# Patient Record
Sex: MALE | Race: WHITE | NOT HISPANIC OR LATINO | Employment: OTHER | URBAN - METROPOLITAN AREA
[De-identification: names, ages, dates, MRNs, and addresses within clinical notes are randomized per-mention and may not be internally consistent; named-entity substitution may affect disease eponyms.]

---

## 2017-02-10 ENCOUNTER — ALLSCRIPTS OFFICE VISIT (OUTPATIENT)
Dept: OTHER | Facility: OTHER | Age: 77
End: 2017-02-10

## 2017-02-20 ENCOUNTER — GENERIC CONVERSION - ENCOUNTER (OUTPATIENT)
Dept: OTHER | Facility: OTHER | Age: 77
End: 2017-02-20

## 2018-01-10 DIAGNOSIS — E78.2 MIXED HYPERLIPIDEMIA: ICD-10-CM

## 2018-01-10 DIAGNOSIS — Z12.5 ENCOUNTER FOR SCREENING FOR MALIGNANT NEOPLASM OF PROSTATE: ICD-10-CM

## 2018-01-12 NOTE — PROGRESS NOTES
Assessment   1  Encounter for preventive health examination (V70 0) (Z00 00)  2  Mixed hyperlipidemia (272 2) (E78 2)  3  Chronic obstructive pulmonary disease (COPD) (496) (J44 9)  4  Seborrhea (706 3) (L21 9)  5  Lumbar degenerative disc disease (722 52) (M51 36)    Plan  Health Maintenance    · Follow-up visit in 1 year Evaluation and Treatment  Follow-up  Status: Hold For -  Scheduling  Requested for: 88TQZ2531   · Medicare Annual Wellness Visit ; every 1 year; Last 63FLJ5193; Next 02CPM8817;  Status:Active  Mixed hyperlipidemia    · (1) CBC/PLT/DIFF; Status:Active; Requested NEN:42JLW4484;    · (1) COMPREHENSIVE METABOLIC PANEL; Status:Active; Requested APQ:88TOB8600;    · (1) LIPID PANEL, FASTING; Status:Active; Requested LXA:46APV5800; Mixed hyperlipidemia, Prostate cancer screening    · (1) PSA (SCREEN) (Dx V76 44 Screen for Prostate Cancer); Status:Active; Requested  FMH:75DGB3015;   Seborrhea    · Start: Triamcinolone Acetonide 0 1 % External Cream; APPLY SPARINGLY TO  AFFECTED AREA(S) 2 TO 3 TIMES DAILY  SOB (shortness of breath)    · Renew: Ventolin  (90 Base) MCG/ACT Inhalation Aerosol Solution; 2 puffs q 4 h  prn sob    Discussion/Summary  Impression: health maintenance visit  Prostate cancer screening: prostate cancer screening is current  Colorectal cancer screening: colorectal cancer screening is current  The immunizations are needed  Self Referrals: No      Chief Complaint  Pt  in office today for a check up  Pt  is requesting his inhaler to be refilled  History of Present Illness  HM, Adult Male: The patient is being seen for a health maintenance evaluation  The last health maintenance visit was 1 year(s) ago  Social History: Household members include spouse  He is   The patient is a former cigarette smoker  He reports occasional alcohol use  General Health: The patient's health since the last visit is described as good  Lifestyle:   He does not use tobacco  He consumes alcohol  He denies drug use  Screening: cancer screening reviewed and current  metabolic screening reviewed and current  risk screening reviewed and current  HPI: Patient comes in for a checkup  Since last year he has had repair of abdominal aortic aneurysm  He complains of itchy rash posterior scalp  Review of Systems    Constitutional: No fever or chills, feels well, no tiredness, no recent weight gain or weight loss  Cardiovascular: No complaints of slow heart rate, no fast heart rate, no chest pain, no palpitations, no leg claudication, no lower extremity  Respiratory: shortness of breath and shortness of breath during exertion  Gastrointestinal: No complaints of abdominal pain, no constipation, no nausea or vomiting, no diarrhea or bloody stools  Musculoskeletal: arthralgias  Active Problems   1  Abdominal aortic aneurysm (441 4) (I71 4)  2  Acute URI (465 9) (J06 9)  3  Benign hypertrophy of prostate (600 00) (N40 0)  4  Benign prostatic hypertrophy with urinary obstruction (600 01,599 69) (N40 1,N13 8)  5  Bronchitis, acute (466 0) (J20 9)  6  Chronic obstructive pulmonary disease (COPD) (496) (J44 9)  7  Chronic sinusitis (473 9) (J32 9)  8  Chronic vertigo (780 4) (R42)  9  Dyspnea on exertion (786 09) (R06 09)  10  Kidney stones (592 0) (N20 0)  11  Lumbar degenerative disc disease (722 52) (M51 36)  12  Mixed hyperlipidemia (272 2) (E78 2)  13  Osteoarthritis (715 90) (M19 90)  14  Peptic reflux disease (530 81) (K21 9)  15  Preop cardiovascular exam (V72 81) (Z01 810)  16  Screening for colon cancer (V76 51) (Z12 11)  17  SOB (shortness of breath) (786 05) (R06 02)  18  Tendonitis of shoulder (726 10) (M75 80)  19   URI (upper respiratory infection) (465 9) (J06 9)    Past Medical History    · History of colonoscopy (V45 89) (F68 285)   · History of EKG (V15 89) (Z92 89)    Surgical History    · History of Endovasc Repair Abd Aorta Aneurysm Place Visceral Extens Prosth · History of Hand Surgery    Family History  Mother    · Family history of Congestive heart failure   · Family history of Diabetes  Father    · Family history of Death    Social History    · Always uses seat belt   · Exercises regularly   · Former smoker (S49 03) (S13 315)   · Lack physical exercise (V69 0) (Z72 3)   · Lives with spouse   ·    · No drug use   · Occasional alcohol use   · Primary spoken language English   · Retired    Current Meds  1  Simvastatin 20 MG Oral Tablet; take 1 tablet by mouth every day; Therapy: 96EPK1022 to (Evaluate:15Jun2017)  Requested for: 20Jun2016; Last   Rx:20Jun2016; Status: ACTIVE - Renewal Denied Ordered  2  Ventolin  (90 Base) MCG/ACT Inhalation Aerosol Solution; 2 puffs q 4 h prn sob; Therapy: 75KWV3368 to (Last Rx:08Oct2016)  Requested for: 54OIF0744 Ordered    Allergies   1  codeine  2  Penicillins    Vitals   Recorded: 71BTC7684 09:00AM Recorded: 52CVS8933 08:23AM   Heart Rate 1 77   Systolic 7349 741   Diastolic 717 90   Height 1 5 ft 8 in   Weight 1 176 lb    BMI Calculated 1 26 76   BSA Calculated 1 1 94   O2 Saturation 1 97       1  Amended By: Amarjit Masterson; Feb 10 2017 9:00 AM EST   Physical Exam    Constitutional   General appearance: No acute distress, well appearing and well nourished  Head and Face   Head and face: Normal     Eyes   Conjunctiva and lids: No erythema, swelling or discharge  Pupils and irises: Equal, round, reactive to light  Ears, Nose, Mouth, and Throat   External inspection of ears and nose: Normal     Otoscopic examination: Tympanic membranes translucent with normal light reflex  Canals patent without erythema  Hearing: Normal     Oropharynx: Normal with no erythema, edema, exudate or lesions  Neck   Neck: Supple, symmetric, trachea midline, no masses  Thyroid: Normal, no thyromegaly  Pulmonary   Respiratory effort: No increased work of breathing or signs of respiratory distress      Auscultation of lungs: Clear to auscultation  Cardiovascular   Auscultation of heart: Normal rate and rhythm, normal S1 and S2, no murmurs  Carotid pulses: 2+ bilaterally  Abdominal aorta: Normal     Chest   Chest: Normal     Abdomen   Abdomen: Non-tender, no masses  Liver and spleen: No hepatomegaly or splenomegaly  Lymphatic   Palpation of lymph nodes in neck: No lymphadenopathy  Palpation of lymph nodes in other areas: No lymphadenopathy  Musculoskeletal   Gait and station: Normal     Inspection/palpation of digits and nails: Normal without clubbing or cyanosis  Inspection/palpation of joints, bones, and muscles: Normal     Skin   Skin and subcutaneous tissue: Abnormal   Crusty rash at base of scalp  Neurologic   Cranial nerves: Cranial nerves 2-12 intact  Reflexes: 2+ and symmetric  Sensation: No sensory loss  Psychiatric   Judgment and insight: Normal     Orientation to person, place and time: Normal     Mood and affect: Normal        Health Management  Health Maintenance   Medicare Annual Wellness Visit; every 1 year; Last 09GPK7600; Next Due: 39ASZ0492;   Active    Signatures   Electronically signed by : BETTY yT ; Feb 10 2017  9:00AM EST                       (Author)

## 2018-01-13 VITALS
WEIGHT: 176 LBS | DIASTOLIC BLOOD PRESSURE: 84 MMHG | OXYGEN SATURATION: 97 % | SYSTOLIC BLOOD PRESSURE: 136 MMHG | HEIGHT: 68 IN | HEART RATE: 77 BPM | BODY MASS INDEX: 26.67 KG/M2

## 2018-01-16 NOTE — RESULT NOTES
Verified Results  NM MYOCARDIAL PERFUSION SPECT (RX STRESS AND/OR REST) 91IAY2542 11:53AM Rubi Shabazz     Test Name Result Flag Reference   NM MYOCARDIAL PERFUSION SPECT (RX STRESS AND/OR REST) (Report)     [address], [address] [place] [address]   [phone]     Rest/Stress Gated SPECT Myocardial Perfusion Imaging After Regadenoson     Patient: Darrell Benitez   MR number: [id number]   Account number: [de-identified]   : 1940   Age: 70 years   Gender: Male   Status: Outpatient   Location: [facility]   Height: 68 in   Weight: 171 lb   BP: 146/ 78 mmHg     Allergies: NO KNOWN ALLERGIES     Diagnosis: R06 02 - Shortness of breath, Z01 810 - Encounter for preprocedural   cardiovascular examination     Primary Physician: Renay Nicholson MD   Referring Physician: Bala Perry MD   Technician: Michelle Olmos   Group: [facility]   Interpreting Physician: Bala Perry MD     INDICATIONS: Detection of coronary artery disease  Pre-operative risk   assessment  HISTORY: The patient is a 70year old  male  Chest pain status: no   chest pain  Other symptoms: dyspnea  Coronary artery disease risk factors:   dyslipidemia and smoking (quit )  Cardiovascular history: none   significant  Co-morbidity: history of lung disease  Medications: a lipid   lowering agent  PHYSICAL EXAM: Baseline physical exam screening: normal      REST ECG: ST segments and T waves were normal  Sinus bradycardia  Minor   Nonspecific ST and T wave abnormalities were present  PROCEDURE: The study was performed at [address] [place] [address]  A   regadenoson infusion pharmacologic stress test was performed  Gated SPECT   myocardial perfusion imaging was performed after stress and at rest  Systolic   blood pressure was 146 mmHg, at the start of the study  Diastolic blood   pressure was 78 mmHg, at the start of the study   The heart rate was 56 bpm, at   the start of the study  Regadenoson protocol:   HR bpm SBP mmHg DBP mmHg Symptoms ST change Rhythm/conduct   Baseline 56 146 78 none none NSR, occasional PAC's   Immediate 102 -- -- mild dyspnea none NSR, frequent PAC's   1 min 101 134 66 same as above none NSR, no ectopy   2 min 90 136 68 subsiding none NSR, no ectopy   3 min 89 -- -- none none NSR, no ectopy   4 min 88 136 66 none none NSR, no ectopy   IV aminophylline ( 50 mg) was administered  STRESS SUMMARY: Duration of pharmacologic stress was 1 min  Maximal heart rate   during stress was 102 bpm  The rate-pressure product for the peak heart rate   and blood pressure was 36435  There was no chest pain during stress  The stress   test was terminated due to protocol completion  The stress ECG was negative for   ischemia  There were no stress arrhythmias or conduction abnormalities  ISOTOPE ADMINISTRATION:   Resting isotope administration Stress isotope administration   Agent Tetrofosmin Tetrofosmin   Dose 9 88 mCi 32 71 mCi   Date 03/11/2016 03/11/2016   Injection time 12:06 13:15   Injection-image interval 45 min 30 min     The radiopharmaceutical was injected at the peak effect of pharmacologic   stress  MYOCARDIAL PERFUSION IMAGING:   The image quality was fair  Rotating projection images reveal mild   diaphragmatic attenuation  Left ventricular size was normal  The TID ratio was   0 89  GATED SPECT:   The calculated left ventricular ejection fraction was 58 %  Left ventricular   ejection fraction was within normal limits by visual estimate  There was no   left ventricular regional abnormality  SUMMARY:   - Stress results: There was no chest pain during stress  - ECG conclusions: The stress ECG was negative for ischemia    - Gated SPECT: The calculated left ventricular ejection fraction was 58 %  Left ventricular ejection fraction was within normal limits by visual estimate     There was no left ventricular regional abnormality  IMPRESSIONS: Small fixed inferior defect likely c/w diaphagmatic attenuation  No significant ischemia identified on this study   Left ventricular systolic   function was normal      Prepared and signed by     Bob Almanzar MD   Signed 03/11/2016 17:33:37

## 2018-01-31 ENCOUNTER — TELEPHONE (OUTPATIENT)
Dept: FAMILY MEDICINE CLINIC | Facility: CLINIC | Age: 78
End: 2018-01-31

## 2018-01-31 DIAGNOSIS — E78.5 HYPERLIPIDEMIA, UNSPECIFIED HYPERLIPIDEMIA TYPE: Primary | ICD-10-CM

## 2018-02-01 RX ORDER — ATORVASTATIN CALCIUM 10 MG/1
10 TABLET, FILM COATED ORAL DAILY
Qty: 30 TABLET | Refills: 5 | Status: SHIPPED | OUTPATIENT
Start: 2018-02-01 | End: 2018-02-13

## 2018-02-13 ENCOUNTER — OFFICE VISIT (OUTPATIENT)
Dept: FAMILY MEDICINE CLINIC | Facility: CLINIC | Age: 78
End: 2018-02-13
Payer: COMMERCIAL

## 2018-02-13 VITALS
WEIGHT: 181 LBS | OXYGEN SATURATION: 97 % | DIASTOLIC BLOOD PRESSURE: 90 MMHG | HEART RATE: 83 BPM | SYSTOLIC BLOOD PRESSURE: 152 MMHG | HEIGHT: 68 IN | BODY MASS INDEX: 27.43 KG/M2

## 2018-02-13 DIAGNOSIS — J44.9 CHRONIC OBSTRUCTIVE PULMONARY DISEASE, UNSPECIFIED COPD TYPE (HCC): ICD-10-CM

## 2018-02-13 DIAGNOSIS — E78.5 HYPERLIPIDEMIA, UNSPECIFIED HYPERLIPIDEMIA TYPE: ICD-10-CM

## 2018-02-13 DIAGNOSIS — L57.0 ACTINIC KERATOSIS: Primary | ICD-10-CM

## 2018-02-13 DIAGNOSIS — M51.9 LUMBAR DISC DISEASE: ICD-10-CM

## 2018-02-13 DIAGNOSIS — L21.9 SEBORRHEA: ICD-10-CM

## 2018-02-13 DIAGNOSIS — R03.0 ELEVATED BLOOD PRESSURE READING: ICD-10-CM

## 2018-02-13 DIAGNOSIS — I71.4 ABDOMINAL AORTIC ANEURYSM (AAA) WITHOUT RUPTURE (HCC): ICD-10-CM

## 2018-02-13 PROBLEM — I71.40 ABDOMINAL AORTIC ANEURYSM (AAA) WITHOUT RUPTURE: Status: ACTIVE | Noted: 2018-02-13

## 2018-02-13 PROCEDURE — 1101F PT FALLS ASSESS-DOCD LE1/YR: CPT | Performed by: FAMILY MEDICINE

## 2018-02-13 PROCEDURE — 99215 OFFICE O/P EST HI 40 MIN: CPT | Performed by: FAMILY MEDICINE

## 2018-02-13 PROCEDURE — 1036F TOBACCO NON-USER: CPT | Performed by: FAMILY MEDICINE

## 2018-02-13 PROCEDURE — 3725F SCREEN DEPRESSION PERFORMED: CPT | Performed by: FAMILY MEDICINE

## 2018-02-13 RX ORDER — TRIAMCINOLONE ACETONIDE 1 MG/G
CREAM TOPICAL 3 TIMES DAILY
Qty: 30 G | Refills: 5 | Status: SHIPPED | OUTPATIENT
Start: 2018-02-13

## 2018-02-13 RX ORDER — NABUMETONE 750 MG/1
1 TABLET, FILM COATED ORAL 2 TIMES DAILY PRN
COMMUNITY
Start: 2017-06-15 | End: 2018-10-29

## 2018-02-13 RX ORDER — TRIAMCINOLONE ACETONIDE 1 MG/G
CREAM TOPICAL
COMMUNITY
Start: 2017-02-10 | End: 2018-02-13 | Stop reason: SDUPTHER

## 2018-02-13 RX ORDER — ATORVASTATIN CALCIUM 20 MG/1
20 TABLET, FILM COATED ORAL DAILY
Qty: 90 TABLET | Refills: 5 | Status: SHIPPED | OUTPATIENT
Start: 2018-02-13 | End: 2018-08-20 | Stop reason: SDUPTHER

## 2018-02-13 RX ORDER — SIMVASTATIN 20 MG
1 TABLET ORAL DAILY
COMMUNITY
Start: 2015-06-22 | End: 2018-02-13

## 2018-02-13 RX ORDER — UMECLIDINIUM BROMIDE AND VILANTEROL TRIFENATATE 62.5; 25 UG/1; UG/1
1 POWDER RESPIRATORY (INHALATION) DAILY
Refills: 5 | COMMUNITY
Start: 2018-01-18 | End: 2020-12-28 | Stop reason: SDUPTHER

## 2018-02-13 RX ORDER — SELENIUM SULFIDE 2.5 MG/100ML
LOTION TOPICAL DAILY PRN
Qty: 400 ML | Refills: 5 | Status: SHIPPED | OUTPATIENT
Start: 2018-02-13 | End: 2020-11-12

## 2018-02-13 NOTE — PROGRESS NOTES
Assessment/Plan:           Problem List Items Addressed This Visit     Chronic obstructive pulmonary disease (HCC)    Hyperlipidemia    Relevant Medications    atorvastatin (LIPITOR) 20 mg tablet    Other Relevant Orders    Comprehensive metabolic panel    Lipid panel    Lumbar disc disease    Abdominal aortic aneurysm (AAA) without rupture (HCC)    Seborrhea    Relevant Medications    selenium sulfide (SELSUN) 2 5 % shampoo    triamcinolone (KENALOG) 0 1 % cream    Actinic keratosis - Primary    Relevant Medications    selenium sulfide (SELSUN) 2 5 % shampoo    triamcinolone (KENALOG) 0 1 % cream    Other Relevant Orders    Ambulatory referral to Dermatology    Elevated blood pressure reading            Subjective:      Patient ID: Lauren Lozoya is a 78 y o  male  Patient comes in for a checkup  He complains of itchy scalp and a skin lesion on his scalp  The following portions of the patient's history were reviewed and updated as appropriate: allergies, current medications, past family history, past medical history, past social history, past surgical history and problem list     Review of Systems   Constitutional: Negative  HENT: Negative  Eyes: Negative  Respiratory: Negative  Cardiovascular: Negative  Gastrointestinal: Negative  Endocrine: Negative  Genitourinary: Negative  Musculoskeletal: Positive for arthralgias and back pain  Skin: Positive for rash  Allergic/Immunologic: Negative  Neurological: Negative  Psychiatric/Behavioral: Negative  Objective:    Vitals:    02/13/18 0925   BP: 152/90   Pulse:    SpO2:         Physical Exam   Constitutional: He is oriented to person, place, and time  He appears well-developed and well-nourished  HENT:   Head: Normocephalic and atraumatic  Right Ear: Tympanic membrane normal    Left Ear: Tympanic membrane normal    Eyes: EOM are normal  Pupils are equal, round, and reactive to light  Neck: Neck supple  Cardiovascular: Normal rate, regular rhythm and normal heart sounds  Pulmonary/Chest: Effort normal and breath sounds normal    Abdominal: Soft  Bowel sounds are normal    Musculoskeletal: Normal range of motion  Neurological: He is alert and oriented to person, place, and time  Skin: Skin is warm and dry  Dry flaky skin of scalp  Red rough macule right temple   Psychiatric: He has a normal mood and affect  Thought content normal    Nursing note and vitals reviewed

## 2018-02-13 NOTE — PATIENT INSTRUCTIONS
He was recently switched to Lipitor from simvastatin  We are increasing his Lipitor to 20 mg daily  We are having him see Dermatology for actinic keratoses possible squamous cell cancer  He will continue follow-up with pain management for his back, vascular surgery for his AAA and pulmonology for his COPD  He is current with colonoscopy  He will monitor his blood pressure at home

## 2018-02-19 DIAGNOSIS — E78.5 HYPERLIPIDEMIA, UNSPECIFIED HYPERLIPIDEMIA TYPE: Primary | ICD-10-CM

## 2018-02-19 NOTE — PROGRESS NOTES
Pt states you increased his Atorvastatin last week to 20mg  He was on 10mg of Atorvastatin prior  Does it need to be increased again?

## 2018-05-07 ENCOUNTER — TELEPHONE (OUTPATIENT)
Dept: FAMILY MEDICINE CLINIC | Facility: CLINIC | Age: 78
End: 2018-05-07

## 2018-05-07 DIAGNOSIS — J06.9 UPPER RESPIRATORY TRACT INFECTION, UNSPECIFIED TYPE: Primary | ICD-10-CM

## 2018-05-07 DIAGNOSIS — J44.9 CHRONIC OBSTRUCTIVE PULMONARY DISEASE, UNSPECIFIED COPD TYPE (HCC): Primary | ICD-10-CM

## 2018-05-07 RX ORDER — AZITHROMYCIN 250 MG/1
TABLET, FILM COATED ORAL
Qty: 6 TABLET | Refills: 0 | Status: SHIPPED | OUTPATIENT
Start: 2018-05-07 | End: 2018-05-12

## 2018-05-07 NOTE — TELEPHONE ENCOUNTER
Pt wife called and he was recently in the E R but forgot to gave the z-reyes  [facility] on [address]  [phone]

## 2018-05-07 NOTE — TELEPHONE ENCOUNTER
Pt wife called stating pt was in the ER and is requesting a nebulizer mask?  Sent to [facility] on [address]

## 2018-06-12 ENCOUNTER — OFFICE VISIT (OUTPATIENT)
Dept: DERMATOLOGY | Facility: CLINIC | Age: 78
End: 2018-06-12
Payer: COMMERCIAL

## 2018-06-12 DIAGNOSIS — Z13.89 SCREENING FOR SKIN CONDITION: ICD-10-CM

## 2018-06-12 DIAGNOSIS — L40.9 PSORIASIS: Primary | ICD-10-CM

## 2018-06-12 PROCEDURE — 99203 OFFICE O/P NEW LOW 30 MIN: CPT | Performed by: DERMATOLOGY

## 2018-06-12 RX ORDER — BETAMETHASONE DIPROPIONATE 0.5 MG/ML
LOTION, AUGMENTED TOPICAL DAILY
Qty: 60 ML | Refills: 3 | Status: SHIPPED | OUTPATIENT
Start: 2018-06-12

## 2018-06-12 NOTE — PROGRESS NOTES
[address] [facility] [address] DERMATOLOGY  [address] [address]     MRN: [id number] : 1940  Encounter: [id number]  Patient Information: Bravo Milligan  Chief complaint:Possible psoriasis of the scalp and overall checkup    History of present illness:  37-year-old maler presents for itchy red scaling scalp which has been present for about a year patient has used some over-the-counter preparations as well as triamcinolone cream with minimal improvement  No past medical history on file  Past Surgical History:   Procedure Laterality Date    ABDOMINAL AORTIC ANEURYSM REPAIR      ENDOVASC REPAIR AAA PLACE VISCERAL EXTENS PROSTH    LAST ASSESSED [id number]    COLONOSCOPY  2011    HAND SURGERY      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      Social History   History   Alcohol Use    Yes     Comment: OCCASIONAL      History   Drug Use No     History   Smoking Status    Former Smoker   Smokeless Tobacco    Never Used     Family History   Problem Relation Age of Onset    Heart failure Mother     Diabetes Mother     Other Mother      MENTAL DISORDER      Meds/Allergies   Allergies   Allergen Reactions    Codeine     Penicillins        Meds:  Prior to Admission medications    Medication Sig Start Date End Date Taking? Authorizing Provider   ANORO ELLIPTA 62 5-25 MCG/INH AEPB Inhale 1 puff daily 18  Yes Historical Provider, MD   atorvastatin (LIPITOR) 20 mg tablet Take 1 tablet (20 mg total) by mouth daily 18  Yes Eran Perkins MD   nabumetone (RELAFEN) 750 mg tablet Take 1 tablet by mouth 2 (two) times a day as needed 6/15/17  Yes Historical Provider, MD   Respiratory Therapy Supplies (NEBULIZER/ADULT MASK) KIT by Does not apply route daily as needed (sob) 18  Yes Eran Perkins MD   selenium sulfide (SELSUN) 2 5 % shampoo Apply topically daily as needed for dandruff Apply to scalp , rinse after 5 minutes   repeat 18  Yes Bella Marcelo MD   triamcinolone (KENALOG) 0 1 % cream Apply topically 3 (three) times a day 2/13/18  Yes Bella Marcelo MD   VENTOLIN  (90 Base) MCG/ACT inhaler INHALE 2 PUFFS EVERY 4 HOURS AS NEEDED FOR SHORTNESS OF BREATH 12/20/17  Yes Historical Provider, MD       Subjective:     Review of Systems:    General: negative for - chills, fatigue, fever,  weight gain or weight loss  Psychological: negative for - anxiety, behavioral disorder, concentration difficulties, decreased libido, depression, irritability, memory difficulties, mood swings, sleep disturbances or suicidal ideation  ENT: negative for - hearing difficulties , nasal congestion, nasal discharge, oral lesions, sinus pain, sneezing, sore throat  Allergy and Immunology: negative for - hives, insect bite sensitivity,  Hematological and Lymphatic: negative for - bleeding problems, blood clots,bruising, swollen lymph nodes  Endocrine: negative for - hair pattern changes, hot flashes, malaise/lethargy, mood swings, palpitations, polydipsia/polyuria, skin changes, temperature intolerance or unexpected weight change  Respiratory: negative for - cough, hemoptysis, orthopnea, shortness of breath, or wheezing  Cardiovascular: negative for - chest pain, dyspnea on exertion, edema,  Gastrointestinal: negative for - abdominal pain, nausea/vomiting  Genito-Urinary: negative for - dysuria, incontinence, irregular/heavy menses or urinary frequency/urgency  Musculoskeletal: negative for - gait disturbance, joint pain, joint stiffness, joint swelling, muscle pain, muscular weakness  Dermatological:  As in HPI  Neurological: negative for confusion, dizziness, headaches, impaired coordination/balance, memory loss, numbness/tingling, seizures, speech problems, tremors or weakness       Objective: There were no vitals taken for this visit      Physical Exam:    General Appearance:    Alert, cooperative, no distress   Head:    Normocephalic, without obvious abnormality, atraumatic           Skin:   A full skin exam was performed including scalp, head scalp, eyes, ears, nose, lips, neck, chest, axilla, abdomen, back, buttocks, bilateral upper extremities, bilateral lower extremities, hands, feet, fingers, toes, fingernails, and toenails  Erythematous well-demarcated plaques with skin silvery scale noted on the scalp no other evidence of psoriasis elsewhere nothing else of concern noted on complete exam     Assessment:     1  Psoriasis  betamethasone, augmented, (DIPROLENE) 0 05 % lotion   2  Screening for skin condition           Plan:   Psoriasis  Patient advised that this is an autoimmune process where the immune system attacks skin and causes the skin to grow more quickly normally replace of skin cells every 5 for 6 weeks we have psoriasis we replace skin cells every 5 or 6 days  Screening for Dermatologic Disorders: Nothing else of concern noted on complete exam follow up in 1 year       Eben Yañez MD  6/12/2018,11:40 AM    Portions of the record may have been created with voice recognition software   Occasional wrong word or "sound a like" substitutions may have occurred due to the inherent limitations of voice recognition software   Read the chart carefully and recognize, using context, where substitutions have occurred

## 2018-06-12 NOTE — PATIENT INSTRUCTIONS
Psoriasis    Patient advised that this is an autoimmune process where the immune system attacks skin and causes the skin to grow more quickly normally replace of skin cells every 5 for 6 weeks we have psoriasis we replace skin cells every 5 or 6 days  Screening for Dermatologic Disorders: Nothing else of concern noted on complete exam follow up in 1 year

## 2018-08-01 DIAGNOSIS — J44.9 CHRONIC OBSTRUCTIVE PULMONARY DISEASE, UNSPECIFIED COPD TYPE (HCC): Primary | ICD-10-CM

## 2018-08-02 ENCOUNTER — APPOINTMENT (OUTPATIENT)
Dept: LAB | Facility: HOSPITAL | Age: 78
End: 2018-08-02
Attending: FAMILY MEDICINE
Payer: COMMERCIAL

## 2018-08-02 DIAGNOSIS — E78.5 HYPERLIPIDEMIA, UNSPECIFIED HYPERLIPIDEMIA TYPE: ICD-10-CM

## 2018-08-02 LAB
ALBUMIN SERPL BCP-MCNC: 3.6 G/DL (ref 3.5–5)
ALP SERPL-CCNC: 71 U/L (ref 46–116)
ALT SERPL W P-5'-P-CCNC: 36 U/L (ref 12–78)
ANION GAP SERPL CALCULATED.3IONS-SCNC: 7 MMOL/L (ref 4–13)
AST SERPL W P-5'-P-CCNC: 21 U/L (ref 5–45)
BILIRUB SERPL-MCNC: 0.8 MG/DL (ref 0.2–1)
BUN SERPL-MCNC: 15 MG/DL (ref 5–25)
CALCIUM SERPL-MCNC: 8.7 MG/DL (ref 8.3–10.1)
CHLORIDE SERPL-SCNC: 107 MMOL/L (ref 100–108)
CHOLEST SERPL-MCNC: 138 MG/DL (ref 50–200)
CO2 SERPL-SCNC: 28 MMOL/L (ref 21–32)
CREAT SERPL-MCNC: 1.26 MG/DL (ref 0.6–1.3)
GFR SERPL CREATININE-BSD FRML MDRD: 55 ML/MIN/1.73SQ M
GLUCOSE P FAST SERPL-MCNC: 104 MG/DL (ref 65–99)
HDLC SERPL-MCNC: 36 MG/DL (ref 40–60)
LDLC SERPL CALC-MCNC: 67 MG/DL (ref 0–100)
NONHDLC SERPL-MCNC: 102 MG/DL
POTASSIUM SERPL-SCNC: 4.1 MMOL/L (ref 3.5–5.3)
PROT SERPL-MCNC: 6.7 G/DL (ref 6.4–8.2)
SODIUM SERPL-SCNC: 142 MMOL/L (ref 136–145)
TRIGL SERPL-MCNC: 176 MG/DL

## 2018-08-02 PROCEDURE — 36415 COLL VENOUS BLD VENIPUNCTURE: CPT

## 2018-08-02 PROCEDURE — 80061 LIPID PANEL: CPT

## 2018-08-02 PROCEDURE — 80053 COMPREHEN METABOLIC PANEL: CPT

## 2018-08-20 ENCOUNTER — OFFICE VISIT (OUTPATIENT)
Dept: FAMILY MEDICINE CLINIC | Facility: CLINIC | Age: 78
End: 2018-08-20
Payer: COMMERCIAL

## 2018-08-20 VITALS
RESPIRATION RATE: 24 BRPM | TEMPERATURE: 97.3 F | BODY MASS INDEX: 27.25 KG/M2 | DIASTOLIC BLOOD PRESSURE: 78 MMHG | SYSTOLIC BLOOD PRESSURE: 132 MMHG | HEART RATE: 58 BPM | OXYGEN SATURATION: 92 % | HEIGHT: 69 IN | WEIGHT: 184 LBS

## 2018-08-20 DIAGNOSIS — M51.9 LUMBAR DISC DISEASE: ICD-10-CM

## 2018-08-20 DIAGNOSIS — J44.9 CHRONIC OBSTRUCTIVE PULMONARY DISEASE, UNSPECIFIED COPD TYPE (HCC): ICD-10-CM

## 2018-08-20 DIAGNOSIS — E78.5 HYPERLIPIDEMIA, UNSPECIFIED HYPERLIPIDEMIA TYPE: Primary | ICD-10-CM

## 2018-08-20 DIAGNOSIS — R73.9 ELEVATED BLOOD SUGAR: ICD-10-CM

## 2018-08-20 PROCEDURE — 99214 OFFICE O/P EST MOD 30 MIN: CPT | Performed by: FAMILY MEDICINE

## 2018-08-20 PROCEDURE — 4040F PNEUMOC VAC/ADMIN/RCVD: CPT | Performed by: FAMILY MEDICINE

## 2018-08-20 PROCEDURE — 3008F BODY MASS INDEX DOCD: CPT | Performed by: FAMILY MEDICINE

## 2018-08-20 RX ORDER — ATORVASTATIN CALCIUM 20 MG/1
20 TABLET, FILM COATED ORAL DAILY
Qty: 90 TABLET | Refills: 3 | Status: SHIPPED | OUTPATIENT
Start: 2018-08-20 | End: 2019-08-29 | Stop reason: SDUPTHER

## 2018-08-20 NOTE — PROGRESS NOTES
Assessment/Plan:           Problem List Items Addressed This Visit     Chronic obstructive pulmonary disease ([address] )     Follow-up with pulmonology         Relevant Orders    CBC and differential    Hyperlipidemia - Primary    Relevant Medications    atorvastatin (LIPITOR) 20 mg tablet    Other Relevant Orders    Comprehensive metabolic panel    Lipid panel    Lumbar disc disease     Follow-up with pain management         Elevated blood sugar     Low carb diet         Relevant Orders    Hemoglobin A1C            Subjective:      Patient ID: Ibis Nam is a 78 y o  male  Patient comes in for checkup  He complains of breathing difficulty with hot humid weather  He complains of low back pain  We increased his Lipitor last visit  He is having no side effects        The following portions of the patient's history were reviewed and updated as appropriate: allergies, current medications, past family history, past medical history, past social history, past surgical history and problem list     Review of Systems   Constitutional: Negative  HENT: Negative  Respiratory: Positive for shortness of breath  Cardiovascular: Negative  Gastrointestinal: Negative  Musculoskeletal: Positive for back pain  Objective:      /78   Pulse 58   Temp (!) 97 3 °F (36 3 °C)   Resp (!) 24   Ht 5' 9" (1 753 m)   Wt 83 5 kg (184 lb)   SpO2 92%   BMI 27 17 kg/m²          Physical Exam   Constitutional: He is oriented to person, place, and time  He appears well-developed and well-nourished  HENT:   Head: Normocephalic and atraumatic  Right Ear: Tympanic membrane normal    Left Ear: Tympanic membrane normal    Eyes: EOM are normal  Pupils are equal, round, and reactive to light  Neck: Neck supple  Cardiovascular: Normal rate, regular rhythm and normal heart sounds  Pulmonary/Chest: Effort normal and breath sounds normal    Abdominal: Soft   Bowel sounds are normal    Musculoskeletal: Normal range of motion  Neurological: He is alert and oriented to person, place, and time  Skin: Skin is warm and dry  Psychiatric: He has a normal mood and affect   Thought content normal

## 2018-10-29 ENCOUNTER — TELEPHONE (OUTPATIENT)
Dept: FAMILY MEDICINE CLINIC | Facility: CLINIC | Age: 78
End: 2018-10-29

## 2018-10-29 DIAGNOSIS — M51.9 LUMBAR DISC DISEASE: Primary | ICD-10-CM

## 2018-10-29 RX ORDER — MELOXICAM 7.5 MG/1
7.5 TABLET ORAL 2 TIMES DAILY
Qty: 60 TABLET | Refills: 5 | Status: SHIPPED | OUTPATIENT
Start: 2018-10-29 | End: 2019-04-04 | Stop reason: SDUPTHER

## 2018-10-29 NOTE — TELEPHONE ENCOUNTER
Pt is wondering if he can stop the nabumetone and switch to meloxicam?  The nabumetone is starting to bother his stomach   [phone]  [facility]     * call pt back

## 2019-01-28 ENCOUNTER — TRANSCRIBE ORDERS (OUTPATIENT)
Dept: ADMINISTRATIVE | Facility: HOSPITAL | Age: 79
End: 2019-01-28

## 2019-01-28 ENCOUNTER — APPOINTMENT (OUTPATIENT)
Dept: LAB | Facility: HOSPITAL | Age: 79
End: 2019-01-28
Payer: COMMERCIAL

## 2019-01-28 ENCOUNTER — HOSPITAL ENCOUNTER (OUTPATIENT)
Dept: RADIOLOGY | Facility: HOSPITAL | Age: 79
Discharge: HOME | End: 2019-01-28
Payer: COMMERCIAL

## 2019-01-28 DIAGNOSIS — N20.0 KIDNEY STONE: ICD-10-CM

## 2019-01-28 DIAGNOSIS — Z12.5 SCREENING FOR PROSTATE CANCER: ICD-10-CM

## 2019-01-28 DIAGNOSIS — Z12.5 SCREENING FOR PROSTATE CANCER: Primary | ICD-10-CM

## 2019-01-28 LAB — PSA SERPL-MCNC: 1.1 NG/ML (ref 0–4)

## 2019-01-28 PROCEDURE — 84153 ASSAY OF PSA TOTAL: CPT

## 2019-01-28 PROCEDURE — 74018 RADEX ABDOMEN 1 VIEW: CPT

## 2019-02-21 ENCOUNTER — OFFICE VISIT (OUTPATIENT)
Dept: FAMILY MEDICINE CLINIC | Facility: CLINIC | Age: 79
End: 2019-02-21
Payer: COMMERCIAL

## 2019-02-21 VITALS
RESPIRATION RATE: 16 BRPM | DIASTOLIC BLOOD PRESSURE: 68 MMHG | TEMPERATURE: 98.3 F | WEIGHT: 180 LBS | SYSTOLIC BLOOD PRESSURE: 122 MMHG | HEIGHT: 69 IN | OXYGEN SATURATION: 94 % | BODY MASS INDEX: 26.66 KG/M2 | HEART RATE: 87 BPM

## 2019-02-21 DIAGNOSIS — E78.5 HYPERLIPIDEMIA, UNSPECIFIED HYPERLIPIDEMIA TYPE: ICD-10-CM

## 2019-02-21 DIAGNOSIS — N20.0 KIDNEY STONES: ICD-10-CM

## 2019-02-21 DIAGNOSIS — J44.9 CHRONIC OBSTRUCTIVE PULMONARY DISEASE, UNSPECIFIED COPD TYPE (HCC): ICD-10-CM

## 2019-02-21 DIAGNOSIS — R73.9 ELEVATED BLOOD SUGAR: ICD-10-CM

## 2019-02-21 DIAGNOSIS — Z87.891 FORMER SMOKER: Primary | ICD-10-CM

## 2019-02-21 PROCEDURE — 1160F RVW MEDS BY RX/DR IN RCRD: CPT | Performed by: FAMILY MEDICINE

## 2019-02-21 PROCEDURE — 1036F TOBACCO NON-USER: CPT | Performed by: FAMILY MEDICINE

## 2019-02-21 PROCEDURE — 1101F PT FALLS ASSESS-DOCD LE1/YR: CPT | Performed by: FAMILY MEDICINE

## 2019-02-21 PROCEDURE — 99214 OFFICE O/P EST MOD 30 MIN: CPT | Performed by: FAMILY MEDICINE

## 2019-02-21 PROCEDURE — 3725F SCREEN DEPRESSION PERFORMED: CPT | Performed by: FAMILY MEDICINE

## 2019-02-21 NOTE — PROGRESS NOTES
Assessment/Plan:    Return visit in 1 year  Fasting blood work prior to visit  Problem List Items Addressed This Visit        Respiratory    Chronic obstructive pulmonary disease ([address] )     Continue Anora Ellipta and Ventolin as needed            Genitourinary    Kidney stones     Maintain good hydration  Follow up with Urology            Other    Hyperlipidemia    Relevant Orders    CBC and differential    Comprehensive metabolic panel    Lipid panel    Elevated blood sugar     Low carb diet         Relevant Orders    Hemoglobin A1C    Former smoker - Primary    Relevant Orders    CT chest wo contrast            Subjective:      Patient ID: Jonnathan North is a 70 y o  male  Patient comes in for checkup  He is now using oxygen at night for his COPD  He continues to follow with Urology for history of kidney stones  The following portions of the patient's history were reviewed and updated as appropriate: allergies, current medications, past family history, past medical history, past social history, past surgical history and problem list     Review of Systems   Constitutional: Negative  HENT: Negative  Respiratory: Positive for shortness of breath  Gastrointestinal: Negative  Objective:      /68   Pulse 87   Temp 98 3 °F (36 8 °C)   Resp 16   Ht 5' 9" (1 753 m)   Wt 81 6 kg (180 lb)   SpO2 94%   BMI 26 58 kg/m²          Physical Exam   Constitutional: He is oriented to person, place, and time  He appears well-developed and well-nourished  HENT:   Head: Normocephalic and atraumatic  Right Ear: Tympanic membrane and external ear normal    Left Ear: Tympanic membrane and external ear normal    Mouth/Throat: Oropharynx is clear and moist    Eyes: Pupils are equal, round, and reactive to light  EOM are normal    Neck: Neck supple  Cardiovascular: Normal rate, regular rhythm and normal heart sounds     Pulmonary/Chest: Effort normal and breath sounds normal  Abdominal: Soft  Bowel sounds are normal    Musculoskeletal: Normal range of motion  Neurological: He is alert and oriented to person, place, and time  Skin: Skin is warm and dry  Psychiatric: He has a normal mood and affect   Thought content normal

## 2019-02-25 ENCOUNTER — APPOINTMENT (OUTPATIENT)
Dept: LAB | Facility: HOSPITAL | Age: 79
End: 2019-02-25
Attending: FAMILY MEDICINE
Payer: COMMERCIAL

## 2019-02-25 ENCOUNTER — TELEPHONE (OUTPATIENT)
Dept: FAMILY MEDICINE CLINIC | Facility: CLINIC | Age: 79
End: 2019-02-25

## 2019-02-25 DIAGNOSIS — R73.9 ELEVATED BLOOD SUGAR: ICD-10-CM

## 2019-02-25 DIAGNOSIS — E78.5 HYPERLIPIDEMIA, UNSPECIFIED HYPERLIPIDEMIA TYPE: ICD-10-CM

## 2019-02-25 LAB
ALBUMIN SERPL BCP-MCNC: 3.5 G/DL (ref 3.5–5)
ALP SERPL-CCNC: 74 U/L (ref 46–116)
ALT SERPL W P-5'-P-CCNC: 29 U/L (ref 12–78)
ANION GAP SERPL CALCULATED.3IONS-SCNC: 5 MMOL/L (ref 4–13)
AST SERPL W P-5'-P-CCNC: 17 U/L (ref 5–45)
BASOPHILS # BLD AUTO: 0.04 THOUSANDS/ΜL (ref 0–0.1)
BASOPHILS NFR BLD AUTO: 1 % (ref 0–1)
BILIRUB SERPL-MCNC: 0.6 MG/DL (ref 0.2–1)
BUN SERPL-MCNC: 22 MG/DL (ref 5–25)
CALCIUM SERPL-MCNC: 8.7 MG/DL (ref 8.3–10.1)
CHLORIDE SERPL-SCNC: 105 MMOL/L (ref 100–108)
CHOLEST SERPL-MCNC: 132 MG/DL (ref 50–200)
CO2 SERPL-SCNC: 30 MMOL/L (ref 21–32)
CREAT SERPL-MCNC: 1.1 MG/DL (ref 0.6–1.3)
EOSINOPHIL # BLD AUTO: 0.46 THOUSAND/ΜL (ref 0–0.61)
EOSINOPHIL NFR BLD AUTO: 6 % (ref 0–6)
ERYTHROCYTE [DISTWIDTH] IN BLOOD BY AUTOMATED COUNT: 12.6 % (ref 11.6–15.1)
EST. AVERAGE GLUCOSE BLD GHB EST-MCNC: 134 MG/DL
GFR SERPL CREATININE-BSD FRML MDRD: 65 ML/MIN/1.73SQ M
GLUCOSE P FAST SERPL-MCNC: 108 MG/DL (ref 65–99)
HBA1C MFR BLD: 6.3 % (ref 4.2–6.3)
HCT VFR BLD AUTO: 50.3 % (ref 36.5–49.3)
HDLC SERPL-MCNC: 39 MG/DL (ref 40–60)
HGB BLD-MCNC: 16.4 G/DL (ref 12–17)
IMM GRANULOCYTES # BLD AUTO: 0.04 THOUSAND/UL (ref 0–0.2)
IMM GRANULOCYTES NFR BLD AUTO: 1 % (ref 0–2)
LDLC SERPL CALC-MCNC: 57 MG/DL (ref 0–100)
LYMPHOCYTES # BLD AUTO: 1.84 THOUSANDS/ΜL (ref 0.6–4.47)
LYMPHOCYTES NFR BLD AUTO: 22 % (ref 14–44)
MCH RBC QN AUTO: 30.2 PG (ref 26.8–34.3)
MCHC RBC AUTO-ENTMCNC: 32.6 G/DL (ref 31.4–37.4)
MCV RBC AUTO: 93 FL (ref 82–98)
MONOCYTES # BLD AUTO: 0.9 THOUSAND/ΜL (ref 0.17–1.22)
MONOCYTES NFR BLD AUTO: 11 % (ref 4–12)
NEUTROPHILS # BLD AUTO: 5.07 THOUSANDS/ΜL (ref 1.85–7.62)
NEUTS SEG NFR BLD AUTO: 59 % (ref 43–75)
NONHDLC SERPL-MCNC: 93 MG/DL
NRBC BLD AUTO-RTO: 0 /100 WBCS
PLATELET # BLD AUTO: 172 THOUSANDS/UL (ref 149–390)
PMV BLD AUTO: 11.8 FL (ref 8.9–12.7)
POTASSIUM SERPL-SCNC: 4 MMOL/L (ref 3.5–5.3)
PROT SERPL-MCNC: 6.9 G/DL (ref 6.4–8.2)
RBC # BLD AUTO: 5.43 MILLION/UL (ref 3.88–5.62)
SODIUM SERPL-SCNC: 140 MMOL/L (ref 136–145)
TRIGL SERPL-MCNC: 178 MG/DL
WBC # BLD AUTO: 8.35 THOUSAND/UL (ref 4.31–10.16)

## 2019-02-25 PROCEDURE — 36415 COLL VENOUS BLD VENIPUNCTURE: CPT

## 2019-02-25 PROCEDURE — 85025 COMPLETE CBC W/AUTO DIFF WBC: CPT

## 2019-02-25 PROCEDURE — 80053 COMPREHEN METABOLIC PANEL: CPT

## 2019-02-25 PROCEDURE — 80061 LIPID PANEL: CPT

## 2019-02-25 PROCEDURE — 83036 HEMOGLOBIN GLYCOSYLATED A1C: CPT

## 2019-02-25 NOTE — TELEPHONE ENCOUNTER
----- Message from Daysi Alvarez MD sent at 2/25/2019  2:36 PM EST -----  Call patient, sugar is high  Low carb diet  Other labs are okay

## 2019-02-28 DIAGNOSIS — J44.9 CHRONIC OBSTRUCTIVE PULMONARY DISEASE, UNSPECIFIED COPD TYPE (HCC): Primary | ICD-10-CM

## 2019-03-01 ENCOUNTER — TELEPHONE (OUTPATIENT)
Dept: FAMILY MEDICINE CLINIC | Facility: CLINIC | Age: 79
End: 2019-03-01

## 2019-03-01 NOTE — TELEPHONE ENCOUNTER
----- Message from Tiffany Oakley sent at 2/28/2019  4:12 PM EST -----  Regarding: FW: PEER REVIEW      ----- Message -----  From: Kely Beasley  Sent: 2/28/2019   4:10 PM  To: [address] [facility]  Subject: RE: PEER REVIEW                                  Hi team,    Please make patient aware  Thanks! Austyn Siddiqui  ----- Message -----  From: Gerardo Calderón MD  Sent: 2/28/2019   3:28 PM  To: Kely Beasley  Subject: RE: PEER REVIEW                                  Have patient do chest x-ray  Order on chart  ----- Message -----  From: Kely Beasley  Sent: 2/28/2019  10:13 AM  To: Laquita Becker MD, #  Subject: PEER REVIEW                                      Good morning Dr Alicia Liu CT chest wo contrast is pending denial through his insurance  They are offering Peer to Peer at [phone]; Case #[id number]  His insurance was willing to approve a low-dose CT (); but [facility] cannot approve this because the age limit for that CPT code network-wide is 76years old; Vicki Camara is 70  Please let me know the outcome of the Peer to Peer OR have your office contact Vicki Camara to review a new plan of care  If you need assistance in what you are able to order to screen him for lung cancer, please reach out to Radiology Mgmt for further assistance      Thank you,  Austyn Siddiqui

## 2019-03-05 ENCOUNTER — HOSPITAL ENCOUNTER (OUTPATIENT)
Dept: RADIOLOGY | Facility: HOSPITAL | Age: 79
Discharge: HOME | End: 2019-03-05
Attending: FAMILY MEDICINE
Payer: COMMERCIAL

## 2019-03-05 DIAGNOSIS — J44.9 CHRONIC OBSTRUCTIVE PULMONARY DISEASE, UNSPECIFIED COPD TYPE (HCC): ICD-10-CM

## 2019-03-05 PROCEDURE — 71046 X-RAY EXAM CHEST 2 VIEWS: CPT

## 2019-03-07 ENCOUNTER — TELEPHONE (OUTPATIENT)
Dept: FAMILY MEDICINE CLINIC | Facility: CLINIC | Age: 79
End: 2019-03-07

## 2019-03-07 NOTE — TELEPHONE ENCOUNTER
----- Message from Shena Peterson PA-C sent at 3/6/2019  4:12 PM EST -----  Chest x-ray shows that the lungs are clear without any pneumothorax or pleural effusion  There is mild flattening of the dome of the diaphragm which raises concern for emphysema

## 2019-04-04 DIAGNOSIS — M51.9 LUMBAR DISC DISEASE: ICD-10-CM

## 2019-04-04 RX ORDER — MELOXICAM 7.5 MG/1
7.5 TABLET ORAL 2 TIMES DAILY
Qty: 60 TABLET | Refills: 5 | Status: SHIPPED | OUTPATIENT
Start: 2019-04-04 | End: 2019-11-22 | Stop reason: SDUPTHER

## 2019-08-29 DIAGNOSIS — E78.5 HYPERLIPIDEMIA, UNSPECIFIED HYPERLIPIDEMIA TYPE: ICD-10-CM

## 2019-08-29 RX ORDER — ATORVASTATIN CALCIUM 20 MG/1
20 TABLET, FILM COATED ORAL DAILY
Qty: 90 TABLET | Refills: 3 | Status: SHIPPED | OUTPATIENT
Start: 2019-08-29 | End: 2021-03-01

## 2019-09-11 DIAGNOSIS — J44.9 CHRONIC OBSTRUCTIVE PULMONARY DISEASE, UNSPECIFIED COPD TYPE (HCC): ICD-10-CM

## 2019-11-15 ENCOUNTER — OFFICE VISIT (OUTPATIENT)
Dept: FAMILY MEDICINE CLINIC | Facility: CLINIC | Age: 79
End: 2019-11-15
Payer: COMMERCIAL

## 2019-11-15 VITALS
DIASTOLIC BLOOD PRESSURE: 68 MMHG | OXYGEN SATURATION: 90 % | BODY MASS INDEX: 24.88 KG/M2 | SYSTOLIC BLOOD PRESSURE: 124 MMHG | HEIGHT: 69 IN | TEMPERATURE: 98 F | HEART RATE: 110 BPM | WEIGHT: 168 LBS

## 2019-11-15 DIAGNOSIS — R03.0 ELEVATED BLOOD PRESSURE, SITUATIONAL: Primary | ICD-10-CM

## 2019-11-15 PROCEDURE — 1036F TOBACCO NON-USER: CPT | Performed by: PHYSICIAN ASSISTANT

## 2019-11-15 PROCEDURE — 1160F RVW MEDS BY RX/DR IN RCRD: CPT | Performed by: PHYSICIAN ASSISTANT

## 2019-11-15 PROCEDURE — 99213 OFFICE O/P EST LOW 20 MIN: CPT | Performed by: PHYSICIAN ASSISTANT

## 2019-11-15 NOTE — PROGRESS NOTES
Assessment/Plan:          Diagnoses and all orders for this visit:    Elevated blood pressure, situational        Patient's blood pressure is normal here in the office  It was taken manually by the MA and myself  Patient is to keep his follow-up appointment with Dr Dino Jimenez  Subjective:      Patient ID: Mony Mosqueda is a 70 y o  male  Patient is here today after being told that his blood pressure was elevated at an urgent care, Dr Kinjal Heaton and another physician  He had a cold which is why he went to urgent care  He followed up with Dr Kinjal Heaton  Someone told him to come here because he was at risk for a heart attack or stroke  Patient did note that his blood pressure was taken over his clothing and with an automated blood pressure cuff  The following portions of the patient's history were reviewed and updated as appropriate:   He has no past medical history on file  ,  does not have any pertinent problems on file  ,   has a past surgical history that includes Colonoscopy (03/09/2011); Abdominal aortic aneurysm repair; and Hand surgery  ,  family history includes Diabetes in his mother; Heart failure in his mother; Other in his mother  ,   reports that he has quit smoking  He has never used smokeless tobacco  He reports that he drinks alcohol  He reports that he does not use drugs  ,  is allergic to codeine; cortisone; penicillin g; and penicillins     Current Outpatient Medications   Medication Sig Dispense Refill    ANORO ELLIPTA 62 5-25 MCG/INH AEPB Inhale 1 puff daily  5    atorvastatin (LIPITOR) 20 mg tablet Take 1 tablet (20 mg total) by mouth daily 90 tablet 3    betamethasone, augmented, (DIPROLENE) 0 05 % lotion Apply topically daily To scalp 60 mL 3    meloxicam (MOBIC) 7 5 mg tablet Take 1 tablet (7 5 mg total) by mouth 2 (two) times a day 60 tablet 5    Respiratory Therapy Supplies (NEBULIZER/ADULT MASK) KIT by Does not apply route daily as needed (sob) 1 each 0    selenium sulfide (SELSUN) 2 5 % shampoo Apply topically daily as needed for dandruff Apply to scalp , rinse after 5 minutes  repeat 400 mL 5    triamcinolone (KENALOG) 0 1 % cream Apply topically 3 (three) times a day 30 g 5    VENTOLIN  (90 Base) MCG/ACT inhaler Inhale 2 puffs every 4 (four) hours as needed for shortness of breath 1 Inhaler 5     No current facility-administered medications for this visit  Review of Systems   Constitutional: Negative for activity change and appetite change  HENT: Positive for congestion  Respiratory: Positive for cough, chest tightness and shortness of breath  Cardiovascular: Negative for chest pain and leg swelling  Gastrointestinal: Negative for abdominal pain  Neurological: Negative for dizziness, light-headedness and headaches  Objective:  Vitals:    11/15/19 1427 11/15/19 1448   BP: 116/76 124/68   Pulse: (!) 110    Temp: 98 °F (36 7 °C)    SpO2: 90%    Weight: 76 2 kg (168 lb)    Height: 5' 9" (1 753 m)      Body mass index is 24 81 kg/m²  Physical Exam   Constitutional: He is oriented to person, place, and time  He appears well-developed and well-nourished  HENT:   Head: Normocephalic  Cardiovascular: Regular rhythm and normal heart sounds  Tachycardia present  Pulmonary/Chest: Effort normal  He has decreased breath sounds  He has no wheezes  He has no rhonchi  He has no rales  Musculoskeletal: He exhibits no edema  Neurological: He is alert and oriented to person, place, and time  Skin: Skin is warm and dry  Psychiatric: He has a normal mood and affect  His behavior is normal    Nursing note and vitals reviewed

## 2019-11-22 DIAGNOSIS — M51.9 LUMBAR DISC DISEASE: ICD-10-CM

## 2019-11-22 RX ORDER — MELOXICAM 7.5 MG/1
7.5 TABLET ORAL 2 TIMES DAILY
Qty: 60 TABLET | Refills: 5 | Status: SHIPPED | OUTPATIENT
Start: 2019-11-22 | End: 2020-02-24

## 2020-02-05 ENCOUNTER — TRANSCRIBE ORDERS (OUTPATIENT)
Dept: ADMINISTRATIVE | Facility: HOSPITAL | Age: 80
End: 2020-02-05

## 2020-02-05 ENCOUNTER — APPOINTMENT (OUTPATIENT)
Dept: LAB | Facility: HOSPITAL | Age: 80
End: 2020-02-05
Attending: FAMILY MEDICINE
Payer: COMMERCIAL

## 2020-02-05 DIAGNOSIS — R73.9 ELEVATED BLOOD SUGAR: ICD-10-CM

## 2020-02-05 DIAGNOSIS — E78.5 HYPERLIPIDEMIA, UNSPECIFIED HYPERLIPIDEMIA TYPE: ICD-10-CM

## 2020-02-05 DIAGNOSIS — Z12.5 SPECIAL SCREENING FOR MALIGNANT NEOPLASM OF PROSTATE: Primary | ICD-10-CM

## 2020-02-05 DIAGNOSIS — E78.5 HYPERLIPIDEMIA, UNSPECIFIED HYPERLIPIDEMIA TYPE: Primary | ICD-10-CM

## 2020-02-05 DIAGNOSIS — Z12.5 SPECIAL SCREENING FOR MALIGNANT NEOPLASM OF PROSTATE: ICD-10-CM

## 2020-02-05 LAB
ALBUMIN SERPL BCP-MCNC: 3.7 G/DL (ref 3.5–5)
ALP SERPL-CCNC: 78 U/L (ref 46–116)
ALT SERPL W P-5'-P-CCNC: 19 U/L (ref 12–78)
ANION GAP SERPL CALCULATED.3IONS-SCNC: 6 MMOL/L (ref 4–13)
AST SERPL W P-5'-P-CCNC: 15 U/L (ref 5–45)
BASOPHILS # BLD AUTO: 0.04 THOUSANDS/ΜL (ref 0–0.1)
BASOPHILS NFR BLD AUTO: 1 % (ref 0–1)
BILIRUB SERPL-MCNC: 0.8 MG/DL (ref 0.2–1)
BUN SERPL-MCNC: 15 MG/DL (ref 5–25)
CALCIUM SERPL-MCNC: 8.7 MG/DL (ref 8.3–10.1)
CHLORIDE SERPL-SCNC: 106 MMOL/L (ref 100–108)
CHOLEST SERPL-MCNC: 140 MG/DL (ref 50–200)
CO2 SERPL-SCNC: 31 MMOL/L (ref 21–32)
CREAT SERPL-MCNC: 1.3 MG/DL (ref 0.6–1.3)
EOSINOPHIL # BLD AUTO: 0.4 THOUSAND/ΜL (ref 0–0.61)
EOSINOPHIL NFR BLD AUTO: 5 % (ref 0–6)
ERYTHROCYTE [DISTWIDTH] IN BLOOD BY AUTOMATED COUNT: 12.8 % (ref 11.6–15.1)
EST. AVERAGE GLUCOSE BLD GHB EST-MCNC: 123 MG/DL
GFR SERPL CREATININE-BSD FRML MDRD: 53 ML/MIN/1.73SQ M
GLUCOSE P FAST SERPL-MCNC: 109 MG/DL (ref 65–99)
HBA1C MFR BLD: 5.9 % (ref 4.2–6.3)
HCT VFR BLD AUTO: 52.6 % (ref 36.5–49.3)
HDLC SERPL-MCNC: 39 MG/DL
HGB BLD-MCNC: 17.4 G/DL (ref 12–17)
IMM GRANULOCYTES # BLD AUTO: 0.02 THOUSAND/UL (ref 0–0.2)
IMM GRANULOCYTES NFR BLD AUTO: 0 % (ref 0–2)
LDLC SERPL CALC-MCNC: 57 MG/DL (ref 0–100)
LYMPHOCYTES # BLD AUTO: 1.62 THOUSANDS/ΜL (ref 0.6–4.47)
LYMPHOCYTES NFR BLD AUTO: 21 % (ref 14–44)
MCH RBC QN AUTO: 31.4 PG (ref 26.8–34.3)
MCHC RBC AUTO-ENTMCNC: 33.1 G/DL (ref 31.4–37.4)
MCV RBC AUTO: 95 FL (ref 82–98)
MONOCYTES # BLD AUTO: 0.71 THOUSAND/ΜL (ref 0.17–1.22)
MONOCYTES NFR BLD AUTO: 9 % (ref 4–12)
NEUTROPHILS # BLD AUTO: 5 THOUSANDS/ΜL (ref 1.85–7.62)
NEUTS SEG NFR BLD AUTO: 64 % (ref 43–75)
NONHDLC SERPL-MCNC: 101 MG/DL
NRBC BLD AUTO-RTO: 0 /100 WBCS
PLATELET # BLD AUTO: 184 THOUSANDS/UL (ref 149–390)
PMV BLD AUTO: 11.6 FL (ref 8.9–12.7)
POTASSIUM SERPL-SCNC: 3.9 MMOL/L (ref 3.5–5.3)
PROT SERPL-MCNC: 7.2 G/DL (ref 6.4–8.2)
PSA SERPL-MCNC: 1.1 NG/ML (ref 0–4)
RBC # BLD AUTO: 5.54 MILLION/UL (ref 3.88–5.62)
SODIUM SERPL-SCNC: 143 MMOL/L (ref 136–145)
TRIGL SERPL-MCNC: 222 MG/DL
WBC # BLD AUTO: 7.79 THOUSAND/UL (ref 4.31–10.16)

## 2020-02-05 PROCEDURE — G0103 PSA SCREENING: HCPCS

## 2020-02-05 PROCEDURE — 36415 COLL VENOUS BLD VENIPUNCTURE: CPT

## 2020-02-05 PROCEDURE — 85025 COMPLETE CBC W/AUTO DIFF WBC: CPT

## 2020-02-05 PROCEDURE — 83036 HEMOGLOBIN GLYCOSYLATED A1C: CPT

## 2020-02-05 PROCEDURE — 80061 LIPID PANEL: CPT

## 2020-02-05 PROCEDURE — 80053 COMPREHEN METABOLIC PANEL: CPT

## 2020-02-21 ENCOUNTER — TRANSCRIBE ORDERS (OUTPATIENT)
Dept: ADMINISTRATIVE | Facility: HOSPITAL | Age: 80
End: 2020-02-21

## 2020-02-21 ENCOUNTER — APPOINTMENT (OUTPATIENT)
Dept: LAB | Facility: HOSPITAL | Age: 80
End: 2020-02-21
Payer: COMMERCIAL

## 2020-02-21 DIAGNOSIS — R31.21 ASYMPTOMATIC MICROSCOPIC HEMATURIA: Primary | ICD-10-CM

## 2020-02-21 DIAGNOSIS — R31.21 ASYMPTOMATIC MICROSCOPIC HEMATURIA: ICD-10-CM

## 2020-02-21 LAB
ANION GAP SERPL CALCULATED.3IONS-SCNC: 8 MMOL/L (ref 4–13)
BUN SERPL-MCNC: 18 MG/DL (ref 5–25)
CALCIUM SERPL-MCNC: 8.8 MG/DL (ref 8.3–10.1)
CHLORIDE SERPL-SCNC: 106 MMOL/L (ref 100–108)
CO2 SERPL-SCNC: 30 MMOL/L (ref 21–32)
CREAT SERPL-MCNC: 1.17 MG/DL (ref 0.6–1.3)
GFR SERPL CREATININE-BSD FRML MDRD: 60 ML/MIN/1.73SQ M
GLUCOSE P FAST SERPL-MCNC: 104 MG/DL (ref 65–99)
POTASSIUM SERPL-SCNC: 4.1 MMOL/L (ref 3.5–5.3)
SODIUM SERPL-SCNC: 144 MMOL/L (ref 136–145)

## 2020-02-21 PROCEDURE — 36415 COLL VENOUS BLD VENIPUNCTURE: CPT

## 2020-02-21 PROCEDURE — 80048 BASIC METABOLIC PNL TOTAL CA: CPT

## 2020-02-24 ENCOUNTER — OFFICE VISIT (OUTPATIENT)
Dept: FAMILY MEDICINE CLINIC | Facility: CLINIC | Age: 80
End: 2020-02-24
Payer: COMMERCIAL

## 2020-02-24 VITALS
OXYGEN SATURATION: 97 % | TEMPERATURE: 98.3 F | SYSTOLIC BLOOD PRESSURE: 122 MMHG | RESPIRATION RATE: 16 BRPM | BODY MASS INDEX: 26.07 KG/M2 | HEIGHT: 69 IN | WEIGHT: 176 LBS | DIASTOLIC BLOOD PRESSURE: 70 MMHG | HEART RATE: 91 BPM

## 2020-02-24 DIAGNOSIS — M51.9 LUMBAR DISC DISEASE: ICD-10-CM

## 2020-02-24 DIAGNOSIS — E78.5 HYPERLIPIDEMIA, UNSPECIFIED HYPERLIPIDEMIA TYPE: ICD-10-CM

## 2020-02-24 DIAGNOSIS — J44.9 CHRONIC OBSTRUCTIVE PULMONARY DISEASE, UNSPECIFIED COPD TYPE (HCC): ICD-10-CM

## 2020-02-24 DIAGNOSIS — I71.4 AAA (ABDOMINAL AORTIC ANEURYSM) WITHOUT RUPTURE (HCC): ICD-10-CM

## 2020-02-24 DIAGNOSIS — R73.03 PREDIABETES: Primary | ICD-10-CM

## 2020-02-24 DIAGNOSIS — Z12.5 PROSTATE CANCER SCREENING: ICD-10-CM

## 2020-02-24 DIAGNOSIS — Z00.00 MEDICARE ANNUAL WELLNESS VISIT, SUBSEQUENT: ICD-10-CM

## 2020-02-24 PROBLEM — R73.9 ELEVATED BLOOD SUGAR: Status: RESOLVED | Noted: 2018-08-20 | Resolved: 2020-02-24

## 2020-02-24 PROCEDURE — 1160F RVW MEDS BY RX/DR IN RCRD: CPT | Performed by: FAMILY MEDICINE

## 2020-02-24 PROCEDURE — 99214 OFFICE O/P EST MOD 30 MIN: CPT | Performed by: FAMILY MEDICINE

## 2020-02-24 PROCEDURE — 1036F TOBACCO NON-USER: CPT | Performed by: FAMILY MEDICINE

## 2020-02-24 PROCEDURE — 1125F AMNT PAIN NOTED PAIN PRSNT: CPT | Performed by: FAMILY MEDICINE

## 2020-02-24 PROCEDURE — 3008F BODY MASS INDEX DOCD: CPT | Performed by: FAMILY MEDICINE

## 2020-02-24 PROCEDURE — 4040F PNEUMOC VAC/ADMIN/RCVD: CPT | Performed by: FAMILY MEDICINE

## 2020-02-24 PROCEDURE — 1170F FXNL STATUS ASSESSED: CPT | Performed by: FAMILY MEDICINE

## 2020-02-24 RX ORDER — CELECOXIB 200 MG/1
200 CAPSULE ORAL 2 TIMES DAILY
Start: 2020-02-24 | End: 2022-02-17

## 2020-02-24 RX ORDER — ROSUVASTATIN CALCIUM 20 MG/1
20 TABLET, COATED ORAL DAILY
Qty: 90 TABLET | Refills: 5 | Status: SHIPPED | OUTPATIENT
Start: 2020-02-24 | End: 2021-03-01 | Stop reason: SDUPTHER

## 2020-02-24 NOTE — ASSESSMENT & PLAN NOTE
Discontinue Lipitor, start Crestor to see if we can get his triglycerides lower as well as help with hair loss

## 2020-02-24 NOTE — PATIENT INSTRUCTIONS

## 2020-02-24 NOTE — PROGRESS NOTES
Assessment and Plan:     Problem List Items Addressed This Visit     None      Visit Diagnoses     Medicare annual wellness visit, subsequent    -  Primary           Preventive health issues were discussed with patient, and age appropriate screening tests were ordered as noted in patient's After Visit Summary  Personalized health advice and appropriate referrals for health education or preventive services given if needed, as noted in patient's After Visit Summary  History of Present Illness:     Patient presents for Medicare Annual Wellness visit    Patient Care Team:  Burgess Seda MD as PCP - General     Problem List:     Patient Active Problem List   Diagnosis    Chronic obstructive pulmonary disease ([address] )    Hyperlipidemia    Lumbar disc disease    Seborrhea    Actinic keratosis    Psoriasis    Screening for skin condition    Elevated blood sugar    Former smoker    Kidney stones    AAA (abdominal aortic aneurysm) without rupture (HCC)    Elevated blood pressure, situational      Past Medical and Surgical History:     History reviewed  No pertinent past medical history    Past Surgical History:   Procedure Laterality Date    ABDOMINAL AORTIC ANEURYSM REPAIR      ENDOVASC REPAIR AAA PLACE VISCERAL EXTENS PROSTH    LAST ASSESSED [id number]    COLONOSCOPY  03/09/2011    HAND SURGERY      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       Family History:     Family History   Problem Relation Age of Onset    Heart failure Mother     Diabetes Mother     Other Mother         MENTAL DISORDER       Social History:        Social History     Socioeconomic History    Marital status: /Civil Union     Spouse name: None    Number of children: None    Years of education: None    Highest education level: None   Occupational History    Occupation: RETIRED    Social Needs    Financial resource strain: None    Food insecurity:     Worry: None     Inability: None    Transportation needs: Medical: None     Non-medical: None   Tobacco Use    Smoking status: Former Smoker    Smokeless tobacco: Never Used   Substance and Sexual Activity    Alcohol use: Yes     Comment: OCCASIONAL     Drug use: No    Sexual activity: None   Lifestyle    Physical activity:     Days per week: None     Minutes per session: None    Stress: None   Relationships    Social connections:     Talks on phone: None     Gets together: None     Attends Taoist service: None     Active member of club or organization: None     Attends meetings of clubs or organizations: None     Relationship status: None    Intimate partner violence:     Fear of current or ex partner: None     Emotionally abused: None     Physically abused: None     Forced sexual activity: None   Other Topics Concern    None   Social History Narrative    ALWAYS USES [employer]    LACK OF PHYSICAL EXERCISE    LIVES WITH SPOUSE       Medications and Allergies:     Current Outpatient Medications   Medication Sig Dispense Refill    ANORO ELLIPTA 62 5-25 MCG/INH AEPB Inhale 1 puff daily  5    atorvastatin (LIPITOR) 20 mg tablet Take 1 tablet (20 mg total) by mouth daily 90 tablet 3    betamethasone, augmented, (DIPROLENE) 0 05 % lotion Apply topically daily To scalp 60 mL 3    meloxicam (MOBIC) 7 5 mg tablet Take 1 tablet (7 5 mg total) by mouth 2 (two) times a day 60 tablet 5    Respiratory Therapy Supplies (NEBULIZER/ADULT MASK) KIT by Does not apply route daily as needed (sob) 1 each 0    selenium sulfide (SELSUN) 2 5 % shampoo Apply topically daily as needed for dandruff Apply to scalp , rinse after 5 minutes  repeat 400 mL 5    triamcinolone (KENALOG) 0 1 % cream Apply topically 3 (three) times a day 30 g 5    VENTOLIN  (90 Base) MCG/ACT inhaler Inhale 2 puffs every 4 (four) hours as needed for shortness of breath 1 Inhaler 5     No current facility-administered medications for this visit        Allergies   Allergen Reactions    Codeine Nausea Only    Cortisone      Other reaction(s): Unknown    Penicillin G      Other reaction(s): Unknown    Penicillins       Immunizations:     Immunization History   Administered Date(s) Administered    INFLUENZA 10/04/2018    Influenza Split High Dose Preservative Free IM 10/04/2018, 09/19/2019    Pneumococcal Conjugate 13-Valent 02/10/2017, 09/19/2019    Td (adult), Not Adsorbed 11/27/2016      Health Maintenance:         Topic Date Due    CRC Screening: Colonoscopy  03/09/2021         Topic Date Due    DTaP,Tdap,and Td Vaccines (1 - Tdap) 11/07/1951      Medicare Health Risk Assessment:     /70   Pulse 91   Temp 98 3 °F (36 8 °C)   Resp 16   Ht 5' 9" (1 753 m)   Wt 79 8 kg (176 lb)   SpO2 97%   BMI 25 99 kg/m²      Sharonda uDran is here for his Subsequent Wellness visit  Last Medicare Wellness visit information reviewed, patient interviewed and updates made to the record today  Health Risk Assessment:   Patient rates overall health as good  Patient feels that their physical health rating is same  Eyesight was rated as slightly worse  Hearing was rated as slightly worse  Patient feels that their emotional and mental health rating is much better  Pain experienced in the last 7 days has been some  Patient states that he has experienced no weight loss or gain in last 6 months  Per patient has arthritis in back  Depression Screening:   PHQ-2 Score: 0      Fall Risk Screening: In the past year, patient has experienced: no history of falling in past year      Home Safety:  Patient does not have trouble with stairs inside or outside of their home  Patient has working smoke alarms and has working carbon monoxide detector  Home safety hazards include: none  Nutrition:   Current diet is Regular  Medications:   Patient is currently taking over-the-counter supplements  OTC medications include: see medication list  Patient is able to manage medications       Activities of Daily Living (ADLs)/Instrumental Activities of Daily Living (IADLs):   Walk and transfer into and out of bed and chair?: Yes  Dress and groom yourself?: Yes    Bathe or shower yourself?: Yes    Feed yourself?  Yes  Do your laundry/housekeeping?: Yes  Manage your money, pay your bills and track your expenses?: Yes  Make your own meals?: Yes    Do your own shopping?: Yes    Previous Hospitalizations:   Any hospitalizations or ED visits within the last 12 months?: No      Advance Care Planning:   Living will: No    Durable POA for healthcare: No    Advanced directive: No    Advanced directive counseling given: Yes    Five wishes given: Yes    End of Life Decisions reviewed with patient: Yes    Provider agrees with end of life decisions: Yes      PREVENTIVE SCREENINGS      Cardiovascular Screening:    General: Screening Not Indicated and History Lipid Disorder      Diabetes Screening:     General: Screening Current      Colorectal Cancer Screening:     General: Screening Current      Prostate Cancer Screening:    General: Screening Not Indicated      Osteoporosis Screening:    General: Screening Not Indicated      Abdominal Aortic Aneurysm (AAA) Screening:    Risk factors include: tobacco use        General: Screening Not Indicated and History AAA      Lung Cancer Screening:     General: Risks and Benefits Discussed    Due for: Low Dose CT (LDCT)      Hepatitis C Screening:    General: Screening Current      Jaqueline Kim MD

## 2020-02-24 NOTE — PROGRESS NOTES
BMI Counseling: Body mass index is 25 99 kg/m²  The BMI is above normal  Nutrition recommendations include decreasing portion sizes and moderation in carbohydrate intake  Exercise recommendations include exercising 3-5 times per week  No pharmacotherapy was ordered  Assessment/Plan:    Return visit in 1 year with fasting blood work prior to visit   Diagnoses and all orders for this visit:    Hyperlipidemia, unspecified hyperlipidemia type  -     rosuvastatin (CRESTOR) 20 MG tablet; Take 1 tablet (20 mg total) by mouth daily  -     CBC and differential; Future  -     Comprehensive metabolic panel; Future  -     Lipid panel; Future    Medicare annual wellness visit, subsequent    Chronic obstructive pulmonary disease, unspecified COPD type ([facility] [address] )    AAA (abdominal aortic aneurysm) without rupture (HCC)    Prediabetes    Lumbar disc disease  -     celecoxib (CeleBREX) 200 mg capsule; Take 1 capsule (200 mg total) by mouth 2 (two) times a day    Prostate cancer screening  -     PSA, Total Screen; Future        Chronic obstructive pulmonary disease (HCC)  Continue Anoro Ellipta and albuterol as needed    AAA (abdominal aortic aneurysm) without rupture (HCC)  Follow-up with vascular surgery    Prediabetes  Low carb diet    Hyperlipidemia  Discontinue Lipitor, start Crestor to see if we can get his triglycerides lower as well as help with hair loss        Subjective:      Patient ID: Durga Santos is a 70 y o  male  Patient comes in for checkup  He is getting epidural injections for lumbar disc disease  He complains of hair loss over the last few months  The following portions of the patient's history were reviewed and updated as appropriate:   He has no past medical history on file  ,  does not have any pertinent problems on file  ,   has a past surgical history that includes Colonoscopy (03/09/2011); Abdominal aortic aneurysm repair; and Hand surgery  ,  family history includes Diabetes in his mother; Heart failure in his mother; Other in his mother  ,   reports that he has quit smoking  He has never used smokeless tobacco  He reports that he drinks alcohol  He reports that he does not use drugs  ,  is allergic to codeine; cortisone; penicillin g; and penicillins     Current Outpatient Medications   Medication Sig Dispense Refill    ANORO ELLIPTA 62 5-25 MCG/INH AEPB Inhale 1 puff daily  5    atorvastatin (LIPITOR) 20 mg tablet Take 1 tablet (20 mg total) by mouth daily 90 tablet 3    betamethasone, augmented, (DIPROLENE) 0 05 % lotion Apply topically daily To scalp 60 mL 3    celecoxib (CeleBREX) 200 mg capsule Take 1 capsule (200 mg total) by mouth 2 (two) times a day      Respiratory Therapy Supplies (NEBULIZER/ADULT MASK) KIT by Does not apply route daily as needed (sob) 1 each 0    rosuvastatin (CRESTOR) 20 MG tablet Take 1 tablet (20 mg total) by mouth daily 90 tablet 5    selenium sulfide (SELSUN) 2 5 % shampoo Apply topically daily as needed for dandruff Apply to scalp , rinse after 5 minutes  repeat 400 mL 5    triamcinolone (KENALOG) 0 1 % cream Apply topically 3 (three) times a day 30 g 5    VENTOLIN  (90 Base) MCG/ACT inhaler Inhale 2 puffs every 4 (four) hours as needed for shortness of breath 1 Inhaler 5     No current facility-administered medications for this visit  Review of Systems   Constitutional: Negative  Respiratory: Negative  Cardiovascular: Negative  Musculoskeletal: Positive for back pain  Objective:  Vitals:    02/24/20 0807   BP: 122/70   Pulse: 91   Resp: 16   Temp: 98 3 °F (36 8 °C)   SpO2: 97%   Weight: 79 8 kg (176 lb)   Height: 5' 9" (1 753 m)     Body mass index is 25 99 kg/m²  Physical Exam   Constitutional: He is oriented to person, place, and time  He appears well-developed and well-nourished  HENT:   Head: Normocephalic and atraumatic     Right Ear: Tympanic membrane and external ear normal    Left Ear: Tympanic membrane and external ear normal    Eyes: Pupils are equal, round, and reactive to light  EOM are normal    Neck: Neck supple  Cardiovascular: Normal rate, regular rhythm and normal heart sounds  Pulmonary/Chest: Effort normal and breath sounds normal    Abdominal: Soft  Bowel sounds are normal    Musculoskeletal: Normal range of motion  Neurological: He is alert and oriented to person, place, and time  Skin: Skin is warm and dry  Psychiatric: He has a normal mood and affect   Thought content normal

## 2020-05-15 ENCOUNTER — TELEPHONE (OUTPATIENT)
Dept: FAMILY MEDICINE CLINIC | Facility: CLINIC | Age: 80
End: 2020-05-15

## 2020-05-15 DIAGNOSIS — R09.81 HEAD CONGESTION: Primary | ICD-10-CM

## 2020-05-15 RX ORDER — FLUTICASONE PROPIONATE 50 MCG
2 SPRAY, SUSPENSION (ML) NASAL DAILY
Qty: 1 BOTTLE | Refills: 5 | Status: SHIPPED | OUTPATIENT
Start: 2020-05-15 | End: 2020-12-01 | Stop reason: SDUPTHER

## 2020-08-18 ENCOUNTER — APPOINTMENT (EMERGENCY)
Dept: RADIOLOGY | Facility: HOSPITAL | Age: 80
End: 2020-08-18
Payer: COMMERCIAL

## 2020-08-18 ENCOUNTER — HOSPITAL ENCOUNTER (EMERGENCY)
Facility: HOSPITAL | Age: 80
Discharge: HOME | End: 2020-08-18
Attending: EMERGENCY MEDICINE | Admitting: EMERGENCY MEDICINE
Payer: COMMERCIAL

## 2020-08-18 ENCOUNTER — APPOINTMENT (EMERGENCY)
Dept: CT IMAGING | Facility: HOSPITAL | Age: 80
End: 2020-08-18
Payer: COMMERCIAL

## 2020-08-18 VITALS
OXYGEN SATURATION: 96 % | HEART RATE: 78 BPM | DIASTOLIC BLOOD PRESSURE: 100 MMHG | TEMPERATURE: 98.5 F | RESPIRATION RATE: 20 BRPM | SYSTOLIC BLOOD PRESSURE: 213 MMHG

## 2020-08-18 DIAGNOSIS — I10 HYPERTENSION: ICD-10-CM

## 2020-08-18 DIAGNOSIS — E87.1 HYPONATREMIA: ICD-10-CM

## 2020-08-18 DIAGNOSIS — R06.00 DYSPNEA: Primary | ICD-10-CM

## 2020-08-18 LAB
ALBUMIN SERPL BCP-MCNC: 3.9 G/DL (ref 3.5–5)
ALP SERPL-CCNC: 60 U/L (ref 46–116)
ALT SERPL W P-5'-P-CCNC: 65 U/L (ref 12–78)
AST SERPL W P-5'-P-CCNC: 18 U/L (ref 5–45)
ATRIAL RATE: 69 BPM
ATRIAL RATE: 85 BPM
BASOPHILS # BLD AUTO: 0.04 THOUSANDS/ΜL (ref 0–0.1)
BASOPHILS NFR BLD AUTO: 0 % (ref 0–1)
BILIRUB DIRECT SERPL-MCNC: 0.11 MG/DL (ref 0–0.2)
BILIRUB SERPL-MCNC: 0.4 MG/DL (ref 0.2–1)
EOSINOPHIL # BLD AUTO: 0 THOUSAND/ΜL (ref 0–0.61)
EOSINOPHIL NFR BLD AUTO: 0 % (ref 0–6)
ERYTHROCYTE [DISTWIDTH] IN BLOOD BY AUTOMATED COUNT: 13 % (ref 11.6–15.1)
HCT VFR BLD AUTO: 52.1 % (ref 36.5–49.3)
HGB BLD-MCNC: 17.4 G/DL (ref 12–17)
IMM GRANULOCYTES # BLD AUTO: 0.19 THOUSAND/UL (ref 0–0.2)
IMM GRANULOCYTES NFR BLD AUTO: 1 % (ref 0–2)
LYMPHOCYTES # BLD AUTO: 1.02 THOUSANDS/ΜL (ref 0.6–4.47)
LYMPHOCYTES NFR BLD AUTO: 7 % (ref 14–44)
MCH RBC QN AUTO: 30.5 PG (ref 26.8–34.3)
MCHC RBC AUTO-ENTMCNC: 33.4 G/DL (ref 31.4–37.4)
MCV RBC AUTO: 91 FL (ref 82–98)
MONOCYTES # BLD AUTO: 1.26 THOUSAND/ΜL (ref 0.17–1.22)
MONOCYTES NFR BLD AUTO: 8 % (ref 4–12)
NEUTROPHILS # BLD AUTO: 12.52 THOUSANDS/ΜL (ref 1.85–7.62)
NEUTS SEG NFR BLD AUTO: 84 % (ref 43–75)
NRBC BLD AUTO-RTO: 0 /100 WBCS
NT-PROBNP SERPL-MCNC: 241 PG/ML
OSMOLALITY UR/SERPL-RTO: 301 MMOL/KG (ref 282–298)
OSMOLALITY UR: 573 MMOL/KG
P AXIS: 64 DEGREES
P AXIS: 73 DEGREES
PLATELET # BLD AUTO: 200 THOUSANDS/UL (ref 149–390)
PMV BLD AUTO: 11.4 FL (ref 8.9–12.7)
PR INTERVAL: 160 MS
PR INTERVAL: 162 MS
PROT SERPL-MCNC: 7.6 G/DL (ref 6.4–8.2)
QRS AXIS: -66 DEGREES
QRS AXIS: -75 DEGREES
QRSD INTERVAL: 84 MS
QRSD INTERVAL: 90 MS
QT INTERVAL: 344 MS
QT INTERVAL: 382 MS
QTC INTERVAL: 409 MS
QTC INTERVAL: 409 MS
RBC # BLD AUTO: 5.7 MILLION/UL (ref 3.88–5.62)
SODIUM 24H UR-SCNC: 81 MOL/L
T WAVE AXIS: 42 DEGREES
T WAVE AXIS: 56 DEGREES
TROPONIN I SERPL-MCNC: <0.02 NG/ML
VENTRICULAR RATE: 69 BPM
VENTRICULAR RATE: 85 BPM
WBC # BLD AUTO: 15.03 THOUSAND/UL (ref 4.31–10.16)

## 2020-08-18 PROCEDURE — 83880 ASSAY OF NATRIURETIC PEPTIDE: CPT | Performed by: EMERGENCY MEDICINE

## 2020-08-18 PROCEDURE — 93005 ELECTROCARDIOGRAM TRACING: CPT

## 2020-08-18 PROCEDURE — 71046 X-RAY EXAM CHEST 2 VIEWS: CPT

## 2020-08-18 PROCEDURE — 99285 EMERGENCY DEPT VISIT HI MDM: CPT | Performed by: EMERGENCY MEDICINE

## 2020-08-18 PROCEDURE — 84484 ASSAY OF TROPONIN QUANT: CPT | Performed by: EMERGENCY MEDICINE

## 2020-08-18 PROCEDURE — 83935 ASSAY OF URINE OSMOLALITY: CPT | Performed by: EMERGENCY MEDICINE

## 2020-08-18 PROCEDURE — 80048 BASIC METABOLIC PNL TOTAL CA: CPT | Performed by: EMERGENCY MEDICINE

## 2020-08-18 PROCEDURE — 83930 ASSAY OF BLOOD OSMOLALITY: CPT | Performed by: EMERGENCY MEDICINE

## 2020-08-18 PROCEDURE — 71275 CT ANGIOGRAPHY CHEST: CPT

## 2020-08-18 PROCEDURE — 80076 HEPATIC FUNCTION PANEL: CPT | Performed by: EMERGENCY MEDICINE

## 2020-08-18 PROCEDURE — 85025 COMPLETE CBC W/AUTO DIFF WBC: CPT | Performed by: EMERGENCY MEDICINE

## 2020-08-18 PROCEDURE — G1004 CDSM NDSC: HCPCS

## 2020-08-18 PROCEDURE — 99285 EMERGENCY DEPT VISIT HI MDM: CPT

## 2020-08-18 PROCEDURE — 84300 ASSAY OF URINE SODIUM: CPT | Performed by: EMERGENCY MEDICINE

## 2020-08-18 PROCEDURE — 93010 ELECTROCARDIOGRAM REPORT: CPT | Performed by: INTERNAL MEDICINE

## 2020-08-18 PROCEDURE — 74175 CTA ABDOMEN W/CONTRAST: CPT

## 2020-08-18 PROCEDURE — 36415 COLL VENOUS BLD VENIPUNCTURE: CPT | Performed by: EMERGENCY MEDICINE

## 2020-08-18 RX ORDER — SODIUM CHLORIDE 9 MG/ML
3 INJECTION INTRAVENOUS
Status: DISCONTINUED | OUTPATIENT
Start: 2020-08-18 | End: 2020-08-18 | Stop reason: HOSPADM

## 2020-08-18 RX ADMIN — IOHEXOL 100 ML: 350 INJECTION, SOLUTION INTRAVENOUS at 03:57

## 2020-08-18 NOTE — ED PROVIDER NOTES
History  Chief Complaint   Patient presents with    Shortness of Breath     Pt reports having difficulty breathing beginning Sunday afternoon  Pt states he feels like it is an allergic reaction because he feels like his throat is closing  63-year-old male presents with a chief complaint of "my breathing "    Patient states on Sunday afternoon, he was watching TV and "I started having a hard time breathing "  Patient states this persisted despite his attempted treatment with his at home oxygen that he has for COPD, which he subsequently stopped as it was trying at his throat  Patient states the symptoms have waxed and waned since then and subsequently since arrival to the emergency room, they have resolved  Patient also notes on Harsh night, he was laying down and his "acid reflux" started acting up which he describes as palpitations in his stomach  Patient states that tonight he was sitting in his chair and felt like his breathing "was locking up "  Patient does note some pharyngitis on Harsh night but this also has subsequently resolved  Patient denies any odynophagia  Patient currently only complaining of "indigestion" that he describes as a substernal feeling of a burning sensation that he has been taking increasing amount of TUMS  Patient was concerned he could be having an allergic reaction from a corticosteroid injection that he had last week in his lower lumbar spine secondary to chronic lumbar pain  Patient denies any pain in the area  Patient denies any erythema or purulence from that area  Patient denies any lower extremity weakness, loss of bowel or bladder  Patient denies any fever/chills  Patient's lower spine without signs of the injection and there is no erythema or drainage from the area  Impression and plan:  Multiple symptoms with a broad differential   Patient does have a history of COPD however he denies any wheezing and there is none on clinical examination    Patient saturating appropriately with no need for immediate intervention  Patient currently complaining of only of "indigestion" that I am concerned may be cardiac in nature  Patient's initial EKG with normal sinus rhythm with no ST elevation  There are nonspecific findings though no prior EKG to compare  On subsequent evaluation, patient with hiccups that he states are similar to his prior feeling of dyspnea; unclear if these could be underlying cardiac etiology or the source of patient's dyspnea  Will place on cardiac monitor, obtain cardiac evaluation and continue to monitor  Patient currently without any oropharyngeal edema or swelling  There is no signs of angioedema  Patient does not have stridor or other signs  Will continue patient on pulmonary monitoring and continue to reassess  History provided by:  Patient  Shortness of Breath   Severity:  Moderate  Onset quality:  Sudden  Timing:  Intermittent  Progression:  Resolved  Relieved by:  Nothing  Worsened by:  Nothing  Associated symptoms: abdominal pain, chest pain and sore throat    Associated symptoms: no claudication, no cough, no diaphoresis, no ear pain, no fever, no headaches, no hemoptysis, no neck pain, no rash, no sputum production, no syncope, no swollen glands, no vomiting and no wheezing        Prior to Admission Medications   Prescriptions Last Dose Informant Patient Reported? Taking?    ANORO ELLIPTA 62 5-25 MCG/INH AEPB  Self Yes No   Sig: Inhale 1 puff daily   Respiratory Therapy Supplies (NEBULIZER/ADULT MASK) KIT  Self No No   Sig: by Does not apply route daily as needed (sob)   VENTOLIN  (90 Base) MCG/ACT inhaler   No No   Sig: Inhale 2 puffs every 4 (four) hours as needed for shortness of breath   atorvastatin (LIPITOR) 20 mg tablet   No No   Sig: Take 1 tablet (20 mg total) by mouth daily   betamethasone, augmented, (DIPROLENE) 0 05 % lotion   No No   Sig: Apply topically daily To scalp   celecoxib (CeleBREX) 200 mg capsule   No No   Sig: Take 1 capsule (200 mg total) by mouth 2 (two) times a day   fluticasone (FLONASE) 50 mcg/act nasal spray   No No   Si sprays into each nostril daily   rosuvastatin (CRESTOR) 20 MG tablet   No No   Sig: Take 1 tablet (20 mg total) by mouth daily   selenium sulfide (SELSUN) 2 5 % shampoo  Self No No   Sig: Apply topically daily as needed for dandruff Apply to scalp , rinse after 5 minutes  repeat   triamcinolone (KENALOG) 0 1 % cream  Self No No   Sig: Apply topically 3 (three) times a day      Facility-Administered Medications: None       Past Medical History:   Diagnosis Date    COPD (chronic obstructive pulmonary disease) (HCC)        Past Surgical History:   Procedure Laterality Date    ABDOMINAL AORTIC ANEURYSM REPAIR      ENDOVASC REPAIR AAA PLACE VISCERAL EXTENS PROSTH    LAST ASSESSED [id number]    COLONOSCOPY  2011    HAND SURGERY      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        Family History   Problem Relation Age of Onset    Heart failure Mother     Diabetes Mother     Other Mother         MENTAL DISORDER      I have reviewed and agree with the history as documented  E-Cigarette/Vaping     E-Cigarette/Vaping Substances     Social History     Tobacco Use    Smoking status: Former Smoker    Smokeless tobacco: Never Used   Substance Use Topics    Alcohol use: Yes     Comment: OCCASIONAL     Drug use: No       Review of Systems   Constitutional: Negative for diaphoresis and fever  HENT: Positive for sore throat  Negative for ear pain  Respiratory: Positive for shortness of breath  Negative for cough, hemoptysis, sputum production and wheezing  Cardiovascular: Positive for chest pain  Negative for claudication and syncope  Gastrointestinal: Positive for abdominal pain  Negative for vomiting  Musculoskeletal: Negative for neck pain  Skin: Negative for rash  Neurological: Negative for headaches     All other systems reviewed and are negative  Physical Exam  Physical Exam  Vitals signs reviewed  Constitutional:       Appearance: He is well-developed  HENT:      Head: Normocephalic and atraumatic  Mouth/Throat:      Mouth: Mucous membranes are moist       Pharynx: Oropharynx is clear  No pharyngeal swelling or oropharyngeal exudate  Eyes:      Pupils: Pupils are equal, round, and reactive to light  Neck:      Musculoskeletal: Neck supple  Cardiovascular:      Rate and Rhythm: Normal rate and regular rhythm  Pulmonary:      Effort: Pulmonary effort is normal  No tachypnea, accessory muscle usage or respiratory distress  Breath sounds: No stridor  No decreased breath sounds, wheezing, rhonchi or rales  Abdominal:      Palpations: Abdomen is soft  Tenderness: There is no abdominal tenderness  There is no guarding or rebound  Musculoskeletal:      Right lower leg: He exhibits no tenderness  No edema  Left lower leg: He exhibits no tenderness  No edema  Lymphadenopathy:      Cervical: No cervical adenopathy  Skin:     General: Skin is warm and dry  Neurological:      Mental Status: He is alert     Psychiatric:         Mood and Affect: Mood normal          Vital Signs  ED Triage Vitals   Temperature Pulse Respirations Blood Pressure SpO2   08/18/20 0215 08/18/20 0213 08/18/20 0213 08/18/20 0215 08/18/20 0213   98 5 °F (36 9 °C) 96 (!) 24 (!) 211/104 96 %      Temp Source Heart Rate Source Patient Position - Orthostatic VS BP Location FiO2 (%)   08/18/20 0215 08/18/20 0213 08/18/20 0215 08/18/20 0215 --   Oral Monitor Lying Left arm       Pain Score       --                  Vitals:    08/18/20 0213 08/18/20 0215 08/18/20 0300 08/18/20 0400   BP:  (!) 211/104 (!) 203/103 (!) 213/100   Pulse: 96  72 78   Patient Position - Orthostatic VS:  Lying Lying Lying         Visual Acuity      ED Medications  Medications   sodium chloride (PF) 0 9 % injection 3 mL (has no administration in time range)   iohexol (OMNIPAQUE) 350 MG/ML injection (MULTI-DOSE) 100 mL (100 mL Intravenous Given 8/18/20 0357)       Diagnostic Studies  Results Reviewed     Procedure Component Value Units Date/Time    Osmolality-"If this is regarding a toxic alcohol, please STOP and consult medical  for further guidance " [[id number]] Collected:  08/18/20 0243    Lab Status: In process Specimen:  Blood from Arm, Right Updated:  08/18/20 0416    Osmolality, urine [[id number]] Collected:  08/18/20 0337    Lab Status: In process Specimen:  Urine, Clean Catch Updated:  08/18/20 0347    Sodium, urine, random [[id number]] Collected:  08/18/20 0337    Lab Status:   In process Specimen:  Urine, Clean Catch Updated:  08/18/20 9780    Basic metabolic panel [[id number]]  (Abnormal) Collected:  08/18/20 0243    Lab Status:  Edited Result - FINAL Specimen:  Blood from Arm, Right Updated:  08/18/20 0344     Sodium 129 mmol/L      Potassium 4 0 mmol/L      Chloride 99 mmol/L      CO2 27 mmol/L      ANION GAP 3 mmol/L      BUN 33 mg/dL      Creatinine 1 25 mg/dL      Glucose 150 mg/dL      Calcium 10 0 mg/dL      eGFR 55 ml/min/1 73sq m     Narrative:       [place] guidelines for Chronic Kidney Disease (CKD):     Stage 1 with normal or high GFR (GFR > 90 mL/min/1 73 square meters)    Stage 2 Mild CKD (GFR = 60-89 mL/min/1 73 square meters)    Stage 3A Moderate CKD (GFR = 45-59 mL/min/1 73 square meters)    Stage 3B Moderate CKD (GFR = 30-44 mL/min/1 73 square meters)    Stage 4 Severe CKD (GFR = 15-29 mL/min/1 73 square meters)    Stage 5 End Stage CKD (GFR <15 mL/min/1 73 square meters)  Note: GFR calculation is accurate only with a steady state creatinine    Troponin I repeat in 3hrs [[id number]]     Lab Status:  No result Specimen:  Blood     NT-BNP PRO [[id number]]  (Normal) Collected:  08/18/20 0243    Lab Status:  Final result Specimen:  Blood from Arm, Right Updated:  08/18/20 0323     NT-proBNP 241 pg/mL     Hepatic function panel [[id number]]  (Normal) Collected:  08/18/20 0243    Lab Status:  Final result Specimen:  Blood from Arm, Right Updated:  08/18/20 0323     Total Bilirubin 0 40 mg/dL      Bilirubin, Direct 0 11 mg/dL      Alkaline Phosphatase 60 U/L      AST 18 U/L      ALT 65 U/L      Total Protein 7 6 g/dL      Albumin 3 9 g/dL     Troponin I [[id number]]  (Normal) Collected:  08/18/20 0243    Lab Status:  Final result Specimen:  Blood from Arm, Right Updated:  08/18/20 0313     Troponin I <0 02 ng/mL     CBC and differential [[id number]]  (Abnormal) Collected:  08/18/20 0243    Lab Status:  Final result Specimen:  Blood from Arm, Right Updated:  08/18/20 0251     WBC 15 03 Thousand/uL      RBC 5 70 Million/uL      Hemoglobin 17 4 g/dL      Hematocrit 52 1 %      MCV 91 fL      MCH 30 5 pg      MCHC 33 4 g/dL      RDW 13 0 %      MPV 11 4 fL      Platelets 198 Thousands/uL      nRBC 0 /100 WBCs      Neutrophils Relative 84 %      Immat GRANS % 1 %      Lymphocytes Relative 7 %      Monocytes Relative 8 %      Eosinophils Relative 0 %      Basophils Relative 0 %      Neutrophils Absolute 12 52 Thousands/µL      Immature Grans Absolute 0 19 Thousand/uL      Lymphocytes Absolute 1 02 Thousands/µL      Monocytes Absolute 1 26 Thousand/µL      Eosinophils Absolute 0 00 Thousand/µL      Basophils Absolute 0 04 Thousands/µL                  CTA dissection protocol chest and abdomen   Final Result by Alyson Teixeira DO (08/18 6618)      No evidence of aortic dissection or aneurysm               Workstation performed: [id number]         X-ray chest 2 views    (Results Pending)              Procedures  Procedures         ED Course  ED Course as of Aug 18 0516   Tue Aug 18, 2020   0541 Discussed with ICU, Dr Mirza Alston, who will evaluate the patient in the emergency room  0344 Sodium(!): 116   0411 Patient did receive corticosteroids injection last week     WBC(!): 15 03   0420 Labs subsequently called, stated initial value was incorrect  Updated patient on this and talked ICU as there is no indication for ICU admission  Will continue to monitor after CT imaging is obtained  Sodium(!): 129   [id number] Patient with intermittent symptoms, waxing and waning with complete resolution in between  I explained the patient this is inconsistent with allergic reaction  Patient's symptoms to me appear to be consistent with him having episode of hiccups though he denies these are similar to this  I explained these may be triggered by patient's hyponatremia Patient notes these episodes are associated with dyspnea though he is maintaining normal pulse oximetry throughout this  Patient has no wheezing or clinical findings of COPD exacerbation states these symptoms are different than prior COPD exacerbation  I do not feel the symptoms are related to patient's COPD  Patient does have modestly low sodium months updated by the laboratory  I suggested admission to the hospital for continued monitoring, hydration, and continued evaluation  I explained I cannot rule out intrathoracic etiologies of patient's symptoms in the emergency room and suggest to continue cardiac monitoring and evaluation  After extensive discussion of these risks, patient continues to decline admission to the hospital for continued monitoring  I explained risks in detail to him and his wife  Patient appears clear competent making his medical decisions  I have explained diagnostic uncertainty  Patient's blood pressure notably elevated though it did improve on re-evaluation currently 187/102  Patient understands concerns associated with this  I have no grounds to keep the patient against his will  Patient is amenable to contacting primary care and states he will return if symptoms worsen  I have emphasized to him and his wife that he may changes mind any times including during this evaluation to stay for continued evaluation and monitoring  MDM      Disposition  Final diagnoses:   Dyspnea   Hyponatremia   Hypertension     Time reflects when diagnosis was documented in both MDM as applicable and the Disposition within this note     Time User Action Codes Description Comment    8/18/2020  4:56 AM Cassandra Mathias Add [R06 00] Dyspnea     8/18/2020  4:57 AM Cassandra Mathias Add [E87 1] Hyponatremia     8/18/2020  4:57 AM Cassandra Mathias Add [I10] Hypertension       ED Disposition     ED Disposition Condition Date/Time Comment    Discharge Stable Tue Aug 18, 2020  4:56 AM Andrea Rodriguez discharge to home/self care  Follow-up Information     Follow up With Specialties Details Why Contact Info Additional Information    Yuliana Hogue MD Family Medicine Schedule an appointment as soon as possible for a visit in 2 days Follow-up and reassessment [id number] [facility] Emergency Department Emergency Medicine Go to  At any time to continue evaluation and for admission for continued monitoring and evaluation   [address] [facility] [id number]  [address] [place] [address], [address] [place] [address], [facility]          Discharge Medication List as of 8/18/2020  4:58 AM      CONTINUE these medications which have NOT CHANGED    Details   ANORO ELLIPTA 62 5-25 MCG/INH AEPB Inhale 1 puff daily, Starting Thu 1/18/2018, Historical Med      atorvastatin (LIPITOR) 20 mg tablet Take 1 tablet (20 mg total) by mouth daily, Starting Thu 8/29/2019, Normal      betamethasone, augmented, (DIPROLENE) 0 05 % lotion Apply topically daily To scalp, Starting Tue 6/12/2018, Normal      celecoxib (CeleBREX) 200 mg capsule Take 1 capsule (200 mg total) by mouth 2 (two) times a day, Starting Mon 2/24/2020, No Print      fluticasone (FLONASE) 50 mcg/act nasal spray 2 sprays into each nostril daily, Starting Fri 5/15/2020, Normal Respiratory Therapy Supplies (NEBULIZER/ADULT MASK) KIT by Does not apply route daily as needed (sob), Starting Mon 5/7/2018, Normal      rosuvastatin (CRESTOR) 20 MG tablet Take 1 tablet (20 mg total) by mouth daily, Starting Mon 2/24/2020, Normal      selenium sulfide (SELSUN) 2 5 % shampoo Apply topically daily as needed for dandruff Apply to scalp , rinse after 5 minutes  repeat, Starting Tue 2/13/2018, Normal      triamcinolone (KENALOG) 0 1 % cream Apply topically 3 (three) times a day, Starting Tue 2/13/2018, Normal      VENTOLIN  (90 Base) MCG/ACT inhaler Inhale 2 puffs every 4 (four) hours as needed for shortness of breath, Starting Wed 9/11/2019, Normal           No discharge procedures on file      PDMP Review     None          ED Provider  Electronically Signed by           Geena Camarena MD  08/18/20 2406

## 2020-08-19 ENCOUNTER — OFFICE VISIT (OUTPATIENT)
Dept: FAMILY MEDICINE CLINIC | Facility: CLINIC | Age: 80
End: 2020-08-19
Payer: COMMERCIAL

## 2020-08-19 VITALS
HEART RATE: 103 BPM | DIASTOLIC BLOOD PRESSURE: 90 MMHG | HEIGHT: 69 IN | TEMPERATURE: 97.1 F | BODY MASS INDEX: 25.03 KG/M2 | SYSTOLIC BLOOD PRESSURE: 144 MMHG | WEIGHT: 169 LBS | OXYGEN SATURATION: 95 %

## 2020-08-19 DIAGNOSIS — E87.1 HYPONATREMIA: ICD-10-CM

## 2020-08-19 DIAGNOSIS — Z87.891 FORMER SMOKER: ICD-10-CM

## 2020-08-19 DIAGNOSIS — J44.9 CHRONIC OBSTRUCTIVE PULMONARY DISEASE, UNSPECIFIED COPD TYPE (HCC): ICD-10-CM

## 2020-08-19 DIAGNOSIS — Z09 FOLLOW-UP EXAM: Primary | ICD-10-CM

## 2020-08-19 DIAGNOSIS — J04.0 LARYNGITIS: ICD-10-CM

## 2020-08-19 DIAGNOSIS — K21.9 GASTROESOPHAGEAL REFLUX DISEASE, ESOPHAGITIS PRESENCE NOT SPECIFIED: ICD-10-CM

## 2020-08-19 DIAGNOSIS — R06.00 DYSPNEA, UNSPECIFIED TYPE: ICD-10-CM

## 2020-08-19 DIAGNOSIS — D72.829 LEUKOCYTOSIS, UNSPECIFIED TYPE: ICD-10-CM

## 2020-08-19 PROCEDURE — 99215 OFFICE O/P EST HI 40 MIN: CPT | Performed by: STUDENT IN AN ORGANIZED HEALTH CARE EDUCATION/TRAINING PROGRAM

## 2020-08-19 PROCEDURE — 1101F PT FALLS ASSESS-DOCD LE1/YR: CPT | Performed by: STUDENT IN AN ORGANIZED HEALTH CARE EDUCATION/TRAINING PROGRAM

## 2020-08-19 PROCEDURE — 3080F DIAST BP >= 90 MM HG: CPT | Performed by: STUDENT IN AN ORGANIZED HEALTH CARE EDUCATION/TRAINING PROGRAM

## 2020-08-19 PROCEDURE — 1036F TOBACCO NON-USER: CPT | Performed by: STUDENT IN AN ORGANIZED HEALTH CARE EDUCATION/TRAINING PROGRAM

## 2020-08-19 PROCEDURE — 1160F RVW MEDS BY RX/DR IN RCRD: CPT | Performed by: STUDENT IN AN ORGANIZED HEALTH CARE EDUCATION/TRAINING PROGRAM

## 2020-08-19 PROCEDURE — 3008F BODY MASS INDEX DOCD: CPT | Performed by: STUDENT IN AN ORGANIZED HEALTH CARE EDUCATION/TRAINING PROGRAM

## 2020-08-19 PROCEDURE — 3288F FALL RISK ASSESSMENT DOCD: CPT | Performed by: STUDENT IN AN ORGANIZED HEALTH CARE EDUCATION/TRAINING PROGRAM

## 2020-08-19 PROCEDURE — 3077F SYST BP >= 140 MM HG: CPT | Performed by: STUDENT IN AN ORGANIZED HEALTH CARE EDUCATION/TRAINING PROGRAM

## 2020-08-19 PROCEDURE — 4040F PNEUMOC VAC/ADMIN/RCVD: CPT | Performed by: STUDENT IN AN ORGANIZED HEALTH CARE EDUCATION/TRAINING PROGRAM

## 2020-08-19 PROCEDURE — 3725F SCREEN DEPRESSION PERFORMED: CPT | Performed by: STUDENT IN AN ORGANIZED HEALTH CARE EDUCATION/TRAINING PROGRAM

## 2020-08-19 RX ORDER — OMEPRAZOLE 20 MG/1
20 CAPSULE, DELAYED RELEASE ORAL DAILY
Qty: 90 CAPSULE | Refills: 0 | Status: SHIPPED | OUTPATIENT
Start: 2020-08-19 | End: 2020-11-09

## 2020-08-19 NOTE — PROGRESS NOTES
Assessment/Plan:         Problem List Items Addressed This Visit        Respiratory    Chronic obstructive pulmonary disease ([address] )    Relevant Orders    Complete PFT with post bronchodilator       Other    Former smoker    Relevant Orders    Ambulatory Referral to Otolaryngology      Other Visit Diagnoses     Follow-up exam    -  Primary    Hyponatremia        Relevant Orders    Basic metabolic panel    Osmolality-"If this is regarding a toxic alcohol, STOP  Test is not routinely indicated  Please consult medical  on call for further guidance "    Dyspnea, unspecified type        Relevant Orders    Complete PFT with post bronchodilator    CT soft tissue neck w wo contrast    Leukocytosis, unspecified type        Relevant Orders    CBC and differential    Laryngitis        Relevant Medications    omeprazole (PriLOSEC) 20 mg delayed release capsule    Other Relevant Orders    Ambulatory Referral to Otolaryngology    Gastroesophageal reflux disease, esophagitis presence not specified        Relevant Medications    omeprazole (PriLOSEC) 20 mg delayed release capsule        Given the patient's smoking history along with acid reflux symptoms have been worsening, will start empiric treatment with PPI, a CT neck soft tissue, refer to ENT for the can rule laryngoscopy given the above history is  Lungs are clear to auscultation today and Mrs  Will likely to be a allergic reaction or COPD exacerbation given the benign findings  Given the hyponatremia, we will repeat labs will  In the hospital they were consistent with hypervolemic hyponatremia  Will repeat CBC but prior leukocytosis is may be from the steroid injection for back pain a few days prior to the ER visit  Given history of COPD, I recommended that he invest in the oximeter and if he continues to have dyspnea an oximeter is stating his oxygen is dropping into the 80s that he should contact EMS      Subjective:      Patient ID: Bkaari Aldana is a 70 y o  male  HPI    Patient is coming in today for 3 days of hoarseness, neck pain, and dry cough  About a week ago he had a steroid injection for chronic lumbar back and went to the ER as he thought it was an allergic reaction  He reports this pain is often on and is associated with coarsening of the voice  He is with his wife who confirms a his voice is more hoarse as well  He has a smoking history and he reports he quit a couple years ago  He also reports last few months he has been having worsening acid reflux as well  He takes multiple Tums and notices symptoms are worse at night and when he lays down  In this also seems to exacerbate the larynx as well  He believes his appetite has been fine and has not lost any weight  He denies any wheezing or chest congestion similar with a COPD exacerbation  The following portions of the patient's history were reviewed and updated as appropriate:   He has a past medical history of COPD (chronic obstructive pulmonary disease) ([facility] [address] )  ,  does not have any pertinent problems on file  ,   has a past surgical history that includes Colonoscopy (03/09/2011); Abdominal aortic aneurysm repair; and Hand surgery  ,  family history includes Diabetes in his mother; Heart failure in his mother; Other in his mother  ,   reports that he has quit smoking  He has never used smokeless tobacco  He reports current alcohol use  He reports that he does not use drugs  ,  is allergic to codeine; cortisone; penicillin g; and penicillins     Current Outpatient Medications   Medication Sig Dispense Refill    ANORO ELLIPTA 62 5-25 MCG/INH AEPB Inhale 1 puff daily  5    atorvastatin (LIPITOR) 20 mg tablet Take 1 tablet (20 mg total) by mouth daily 90 tablet 3    betamethasone, augmented, (DIPROLENE) 0 05 % lotion Apply topically daily To scalp 60 mL 3    fluticasone (FLONASE) 50 mcg/act nasal spray 2 sprays into each nostril daily 1 Bottle 5    VENTOLIN  (90 Base) MCG/ACT inhaler Inhale 2 puffs every 4 (four) hours as needed for shortness of breath 1 Inhaler 5    celecoxib (CeleBREX) 200 mg capsule Take 1 capsule (200 mg total) by mouth 2 (two) times a day (Patient not taking: Reported on 8/19/2020)      omeprazole (PriLOSEC) 20 mg delayed release capsule Take 1 capsule (20 mg total) by mouth daily 90 capsule 0    Respiratory Therapy Supplies (NEBULIZER/ADULT MASK) KIT by Does not apply route daily as needed (sob) (Patient not taking: Reported on 8/19/2020) 1 each 0    rosuvastatin (CRESTOR) 20 MG tablet Take 1 tablet (20 mg total) by mouth daily (Patient not taking: Reported on 8/19/2020) 90 tablet 5    selenium sulfide (SELSUN) 2 5 % shampoo Apply topically daily as needed for dandruff Apply to scalp , rinse after 5 minutes  repeat (Patient not taking: Reported on 8/19/2020) 400 mL 5    triamcinolone (KENALOG) 0 1 % cream Apply topically 3 (three) times a day (Patient not taking: Reported on 8/19/2020) 30 g 5     No current facility-administered medications for this visit  Review of Systems   Constitutional: Negative for activity change, appetite change, chills, fatigue, fever and unexpected weight change  HENT: Positive for sore throat and voice change  Negative for congestion, postnasal drip, rhinorrhea, sinus pressure, sinus pain and trouble swallowing  Eyes: Negative for visual disturbance  Respiratory: Positive for cough  Negative for choking, shortness of breath, wheezing and stridor  Cardiovascular: Negative for chest pain and palpitations  Gastrointestinal: Positive for abdominal pain  Negative for constipation, diarrhea, nausea and vomiting  Genitourinary: Negative for difficulty urinating, dysuria and frequency  Musculoskeletal: Negative for arthralgias and myalgias  Skin: Negative for color change and rash  Neurological: Negative for weakness and headaches           Objective:  Vitals:    08/19/20 1443   BP: 144/90   BP Location: Left arm Patient Position: Sitting   Pulse: 103   Temp: (!) 97 1 °F (36 2 °C)   SpO2: 95%   Weight: 76 7 kg (169 lb)   Height: 5' 9" (1 753 m)     Body mass index is 24 96 kg/m²  Physical Exam  Constitutional:       General: He is not in acute distress  Appearance: He is not ill-appearing, toxic-appearing or diaphoretic  HENT:      Head: Normocephalic and atraumatic  Jaw: There is normal jaw occlusion  No trismus  Right Ear: Hearing, tympanic membrane, ear canal and external ear normal       Left Ear: Hearing, tympanic membrane, ear canal and external ear normal       Nose: Nose normal  No congestion or rhinorrhea  Mouth/Throat:      Lips: Pink  Mouth: Mucous membranes are moist       Tongue: No lesions  Palate: No mass  Pharynx: Oropharynx is clear  Uvula midline  No pharyngeal swelling, oropharyngeal exudate, posterior oropharyngeal erythema or uvula swelling  Tonsils: No tonsillar exudate or tonsillar abscesses  0 on the right  0 on the left  Eyes:      Extraocular Movements: Extraocular movements intact  Conjunctiva/sclera: Conjunctivae normal       Pupils: Pupils are equal, round, and reactive to light  Neck:      Musculoskeletal: Normal range of motion  Normal range of motion  No edema, erythema, neck rigidity, crepitus, injury, pain with movement or torticollis  Thyroid: No thyroid mass or thyroid tenderness  Trachea: Trachea and phonation normal  No tracheal tenderness or tracheal deviation  Cardiovascular:      Rate and Rhythm: Normal rate and regular rhythm  Pulses: Normal pulses  Heart sounds: No murmur  Pulmonary:      Effort: Pulmonary effort is normal  No accessory muscle usage, prolonged expiration or respiratory distress  Breath sounds: Normal breath sounds  No stridor, decreased air movement or transmitted upper airway sounds  No decreased breath sounds, wheezing, rhonchi or rales  Chest:      Chest wall: No tenderness  Abdominal:      General: Abdomen is flat  Bowel sounds are normal  There is no distension or abdominal bruit  There are no signs of injury  Palpations: Abdomen is soft  Tenderness: There is no abdominal tenderness  There is no right CVA tenderness, left CVA tenderness or guarding  Musculoskeletal: Normal range of motion  Lymphadenopathy:      Cervical:      Right cervical: No superficial, deep or posterior cervical adenopathy  Left cervical: No superficial, deep or posterior cervical adenopathy  Skin:     General: Skin is warm and dry  Capillary Refill: Capillary refill takes less than 2 seconds  Findings: No rash  Neurological:      General: No focal deficit present  Mental Status: He is alert  Mental status is at baseline

## 2020-08-19 NOTE — PATIENT INSTRUCTIONS
Laryngitis   WHAT YOU NEED TO KNOW:   Laryngitis is when your larynx is swollen because of an infection or irritation  The larynx is also called the voice box because it contains your vocal cords  Your vocal cords also swell and change shape, which can cause your voice to sound different  DISCHARGE INSTRUCTIONS:   Take your medicine as directed  Contact your healthcare provider if you think your medicine is not helping or if you have side effects  Tell him or her if you are allergic to any medicine  Keep a list of the medicines, vitamins, and herbs you take  Include the amounts, and when and why you take them  Bring the list or the pill bottles to follow-up visits  Carry your medicine list with you in case of an emergency  Prevent laryngitis:   · Rest your voice:  Do not shout or scream if you get laryngitis often  This will help prevent swelling and irritation of your larynx  · Avoid irritants and harmful substances:  Do not breathe in chemicals or allergens, such as pollen  Alcohol and tobacco can also irritate your larynx  · Avoid foods and liquids that can cause acid reflux: These may include carbonated drinks, spicy foods and sauces, citrus fruit, peppermint, and chocolate  · Avoid the spread of germs:        [facility] your hands often with soap and water  Carry germ-killing gel with you  You can use the gel to clean your hands when there is no soap and water available  ¨ Do not touch your eyes, nose, or mouth unless you have washed your hands first     ¨ Always cover your mouth when you cough  Cough into a tissue or your shirtsleeve so you do not spread germs from your hands  ¨ Try to avoid people who have a cold or the flu  If you are sick, stay away from others as much as possible  Follow up with your healthcare provider as directed:  Write down your questions so you remember to ask them during your visits  Contact your healthcare provider if:   · You have a fever      · You feel large, tender lumps in your neck  · You are hoarse for more than 7 days  · You have new or increased throat pain  · You have questions about your condition or care  Seek care immediately or call 911 if:   · Your throat is bleeding  · You are hoarse for more than 7 days and your chest feels tight  · You are drooling and have trouble swallowing  · You have trouble breathing  © 2017 [employer]  Information is for End User's use only and may not be sold, redistributed or otherwise used for commercial purposes  All illustrations and images included in CareNotes® are the copyrighted property of [employer]  or [employer]  The above information is an  only  It is not intended as medical advice for individual conditions or treatments  Talk to your doctor, nurse or pharmacist before following any medical regimen to see if it is safe and effective for you

## 2020-08-20 ENCOUNTER — HOSPITAL ENCOUNTER (EMERGENCY)
Facility: HOSPITAL | Age: 80
Discharge: HOME | End: 2020-08-20
Attending: EMERGENCY MEDICINE | Admitting: EMERGENCY MEDICINE
Payer: COMMERCIAL

## 2020-08-20 ENCOUNTER — APPOINTMENT (EMERGENCY)
Dept: CT IMAGING | Facility: HOSPITAL | Age: 80
End: 2020-08-20
Payer: COMMERCIAL

## 2020-08-20 VITALS
BODY MASS INDEX: 25.07 KG/M2 | HEART RATE: 68 BPM | RESPIRATION RATE: 17 BRPM | TEMPERATURE: 98.2 F | WEIGHT: 169.75 LBS | DIASTOLIC BLOOD PRESSURE: 119 MMHG | SYSTOLIC BLOOD PRESSURE: 162 MMHG | OXYGEN SATURATION: 94 %

## 2020-08-20 DIAGNOSIS — R06.00 DYSPNEA: ICD-10-CM

## 2020-08-20 DIAGNOSIS — R09.89 GLOBUS SENSATION: Primary | ICD-10-CM

## 2020-08-20 DIAGNOSIS — K20.90 ESOPHAGITIS: ICD-10-CM

## 2020-08-20 LAB
ALBUMIN SERPL BCP-MCNC: 3.4 G/DL (ref 3.5–5)
ALP SERPL-CCNC: 58 U/L (ref 46–116)
ALT SERPL W P-5'-P-CCNC: 46 U/L (ref 12–78)
ANION GAP SERPL CALCULATED.3IONS-SCNC: 3 MMOL/L (ref 4–13)
ANION GAP SERPL CALCULATED.3IONS-SCNC: 7 MMOL/L (ref 4–13)
APTT PPP: 25 SECONDS (ref 23–37)
AST SERPL W P-5'-P-CCNC: 15 U/L (ref 5–45)
ATRIAL RATE: 61 BPM
ATRIAL RATE: 77 BPM
ATRIAL RATE: 83 BPM
BASE EX.OXY STD BLDV CALC-SCNC: 89.7 % (ref 60–80)
BASE EXCESS BLDV CALC-SCNC: -2 MMOL/L
BASOPHILS # BLD AUTO: 0.04 THOUSANDS/ΜL (ref 0–0.1)
BASOPHILS NFR BLD AUTO: 0 % (ref 0–1)
BILIRUB DIRECT SERPL-MCNC: 0.1 MG/DL (ref 0–0.2)
BILIRUB SERPL-MCNC: 0.4 MG/DL (ref 0.2–1)
BUN SERPL-MCNC: 33 MG/DL (ref 5–25)
BUN SERPL-MCNC: 33 MG/DL (ref 5–25)
CALCIUM SERPL-MCNC: 10 MG/DL (ref 8.3–10.1)
CALCIUM SERPL-MCNC: 8.7 MG/DL (ref 8.3–10.1)
CHLORIDE SERPL-SCNC: 103 MMOL/L (ref 100–108)
CHLORIDE SERPL-SCNC: 99 MMOL/L (ref 100–108)
CO2 SERPL-SCNC: 27 MMOL/L (ref 21–32)
CO2 SERPL-SCNC: 27 MMOL/L (ref 21–32)
CREAT SERPL-MCNC: 1.25 MG/DL (ref 0.6–1.3)
CREAT SERPL-MCNC: 1.28 MG/DL (ref 0.6–1.3)
EOSINOPHIL # BLD AUTO: 0 THOUSAND/ΜL (ref 0–0.61)
EOSINOPHIL NFR BLD AUTO: 0 % (ref 0–6)
ERYTHROCYTE [DISTWIDTH] IN BLOOD BY AUTOMATED COUNT: 13.1 % (ref 11.6–15.1)
GFR SERPL CREATININE-BSD FRML MDRD: 54 ML/MIN/1.73SQ M
GFR SERPL CREATININE-BSD FRML MDRD: 55 ML/MIN/1.73SQ M
GLUCOSE SERPL-MCNC: 133 MG/DL (ref 65–140)
GLUCOSE SERPL-MCNC: 150 MG/DL (ref 65–140)
HCO3 BLDV-SCNC: 22.7 MMOL/L (ref 24–30)
HCT VFR BLD AUTO: 51.3 % (ref 36.5–49.3)
HGB BLD-MCNC: 17.2 G/DL (ref 12–17)
IMM GRANULOCYTES # BLD AUTO: 0.28 THOUSAND/UL (ref 0–0.2)
IMM GRANULOCYTES NFR BLD AUTO: 1 % (ref 0–2)
INR PPP: 1.02 (ref 0.84–1.19)
LYMPHOCYTES # BLD AUTO: 1.05 THOUSANDS/ΜL (ref 0.6–4.47)
LYMPHOCYTES NFR BLD AUTO: 5 % (ref 14–44)
MAGNESIUM SERPL-MCNC: 1.9 MG/DL (ref 1.6–2.6)
MCH RBC QN AUTO: 30.7 PG (ref 26.8–34.3)
MCHC RBC AUTO-ENTMCNC: 33.5 G/DL (ref 31.4–37.4)
MCV RBC AUTO: 91 FL (ref 82–98)
MONOCYTES # BLD AUTO: 1.68 THOUSAND/ΜL (ref 0.17–1.22)
MONOCYTES NFR BLD AUTO: 8 % (ref 4–12)
NEUTROPHILS # BLD AUTO: 17.53 THOUSANDS/ΜL (ref 1.85–7.62)
NEUTS SEG NFR BLD AUTO: 86 % (ref 43–75)
NRBC BLD AUTO-RTO: 0 /100 WBCS
NT-PROBNP SERPL-MCNC: 119 PG/ML
O2 CT BLDV-SCNC: 22.4 ML/DL
P AXIS: 62 DEGREES
P AXIS: 68 DEGREES
P AXIS: 69 DEGREES
PCO2 BLDV: 39.1 MM HG (ref 42–50)
PH BLDV: 7.38 [PH] (ref 7.3–7.4)
PLATELET # BLD AUTO: 167 THOUSANDS/UL (ref 149–390)
PMV BLD AUTO: 11.4 FL (ref 8.9–12.7)
PO2 BLDV: 60.6 MM HG (ref 35–45)
POTASSIUM SERPL-SCNC: 4 MMOL/L (ref 3.5–5.3)
POTASSIUM SERPL-SCNC: 4.5 MMOL/L (ref 3.5–5.3)
PR INTERVAL: 152 MS
PR INTERVAL: 154 MS
PR INTERVAL: 166 MS
PROT SERPL-MCNC: 6.7 G/DL (ref 6.4–8.2)
PROTHROMBIN TIME: 13.6 SECONDS (ref 11.6–14.5)
QRS AXIS: -69 DEGREES
QRS AXIS: -74 DEGREES
QRS AXIS: -75 DEGREES
QRSD INTERVAL: 76 MS
QRSD INTERVAL: 78 MS
QRSD INTERVAL: 82 MS
QT INTERVAL: 366 MS
QT INTERVAL: 368 MS
QT INTERVAL: 398 MS
QTC INTERVAL: 400 MS
QTC INTERVAL: 414 MS
QTC INTERVAL: 432 MS
RBC # BLD AUTO: 5.61 MILLION/UL (ref 3.88–5.62)
S PYO DNA THROAT QL NAA+PROBE: NORMAL
SODIUM SERPL-SCNC: 129 MMOL/L (ref 136–145)
SODIUM SERPL-SCNC: 137 MMOL/L (ref 136–145)
T WAVE AXIS: 49 DEGREES
T WAVE AXIS: 58 DEGREES
T WAVE AXIS: 62 DEGREES
TROPONIN I SERPL-MCNC: <0.02 NG/ML
TROPONIN I SERPL-MCNC: <0.02 NG/ML
VENTRICULAR RATE: 61 BPM
VENTRICULAR RATE: 77 BPM
VENTRICULAR RATE: 83 BPM
WBC # BLD AUTO: 20.58 THOUSAND/UL (ref 4.31–10.16)

## 2020-08-20 PROCEDURE — 99285 EMERGENCY DEPT VISIT HI MDM: CPT | Performed by: EMERGENCY MEDICINE

## 2020-08-20 PROCEDURE — 83880 ASSAY OF NATRIURETIC PEPTIDE: CPT | Performed by: EMERGENCY MEDICINE

## 2020-08-20 PROCEDURE — 85610 PROTHROMBIN TIME: CPT | Performed by: EMERGENCY MEDICINE

## 2020-08-20 PROCEDURE — G1004 CDSM NDSC: HCPCS

## 2020-08-20 PROCEDURE — 71275 CT ANGIOGRAPHY CHEST: CPT

## 2020-08-20 PROCEDURE — 84484 ASSAY OF TROPONIN QUANT: CPT | Performed by: EMERGENCY MEDICINE

## 2020-08-20 PROCEDURE — 82805 BLOOD GASES W/O2 SATURATION: CPT | Performed by: EMERGENCY MEDICINE

## 2020-08-20 PROCEDURE — 93010 ELECTROCARDIOGRAM REPORT: CPT | Performed by: INTERNAL MEDICINE

## 2020-08-20 PROCEDURE — 85730 THROMBOPLASTIN TIME PARTIAL: CPT | Performed by: EMERGENCY MEDICINE

## 2020-08-20 PROCEDURE — 70491 CT SOFT TISSUE NECK W/DYE: CPT

## 2020-08-20 PROCEDURE — 36415 COLL VENOUS BLD VENIPUNCTURE: CPT | Performed by: EMERGENCY MEDICINE

## 2020-08-20 PROCEDURE — 87651 STREP A DNA AMP PROBE: CPT | Performed by: EMERGENCY MEDICINE

## 2020-08-20 PROCEDURE — 80048 BASIC METABOLIC PNL TOTAL CA: CPT | Performed by: EMERGENCY MEDICINE

## 2020-08-20 PROCEDURE — 85025 COMPLETE CBC W/AUTO DIFF WBC: CPT | Performed by: EMERGENCY MEDICINE

## 2020-08-20 PROCEDURE — 93005 ELECTROCARDIOGRAM TRACING: CPT

## 2020-08-20 PROCEDURE — 99285 EMERGENCY DEPT VISIT HI MDM: CPT

## 2020-08-20 PROCEDURE — 83735 ASSAY OF MAGNESIUM: CPT | Performed by: EMERGENCY MEDICINE

## 2020-08-20 PROCEDURE — 80076 HEPATIC FUNCTION PANEL: CPT | Performed by: EMERGENCY MEDICINE

## 2020-08-20 RX ORDER — ALUMINA, MAGNESIA, AND SIMETHICONE 2400; 2400; 240 MG/30ML; MG/30ML; MG/30ML
20 SUSPENSION ORAL EVERY 8 HOURS PRN
Qty: 355 ML | Refills: 0 | Status: SHIPPED | OUTPATIENT
Start: 2020-08-20 | End: 2021-03-01

## 2020-08-20 RX ORDER — LIDOCAINE HYDROCHLORIDE 20 MG/ML
10 SOLUTION OROPHARYNGEAL ONCE
Status: COMPLETED | OUTPATIENT
Start: 2020-08-20 | End: 2020-08-20

## 2020-08-20 RX ORDER — MAGNESIUM HYDROXIDE/ALUMINUM HYDROXICE/SIMETHICONE 120; 1200; 1200 MG/30ML; MG/30ML; MG/30ML
30 SUSPENSION ORAL ONCE
Status: COMPLETED | OUTPATIENT
Start: 2020-08-20 | End: 2020-08-20

## 2020-08-20 RX ADMIN — IOHEXOL 100 ML: 350 INJECTION, SOLUTION INTRAVENOUS at 10:40

## 2020-08-20 RX ADMIN — ALUMINUM HYDROXIDE, MAGNESIUM HYDROXIDE, AND SIMETHICONE 30 ML: 200; 200; 20 SUSPENSION ORAL at 11:57

## 2020-08-20 RX ADMIN — LIDOCAINE HYDROCHLORIDE 10 ML: 20 SOLUTION ORAL; TOPICAL at 11:57

## 2020-08-20 NOTE — DISCHARGE INSTRUCTIONS
Esophagitis   WHAT YOU NEED TO KNOW:   Esophagitis is inflammation or irritation of the lining of the esophagus  DISCHARGE INSTRUCTIONS:   Call 911 for any of the following:   · You have chest pain that does not go away within a few minutes or gets worse  Seek care immediately if:   · You feel like you have food stuck in your throat and you cannot cough it out  Contact your healthcare provider if:   · You have new or worsening symptoms, even after treatment  · You have questions or concerns about your condition or care  Medicines:   · Medicines  may be given to fight an infection or to control stomach acid  · Take your medicine as directed  Contact your healthcare provider if you think your medicine is not helping or if you have side effects  Tell him or her if you are allergic to any medicine  Keep a list of the medicines, vitamins, and herbs you take  Include the amounts, and when and why you take them  Bring the list or the pill bottles to follow-up visits  Carry your medicine list with you in case of an emergency  Follow up with your healthcare provider as directed: You may need ongoing tests or treatment  Write down your questions so you remember to ask them during your visits  Do not smoke:  Nicotine and other chemicals in cigarettes and cigars can cause blood vessel and lung damage  Ask your healthcare provider for information if you currently smoke and need help to quit  E-cigarettes or smokeless tobacco still contain nicotine  Talk to your healthcare provider before you use these products  Do not drink alcohol:  Alcohol can irritate your esophagus  Talk to your healthcare provider if you need help to stop drinking  Keep batteries and similar objects out of the reach of children:  Babies often put items in their mouths to explore them  Button batteries are easy to swallow and can cause serious damage   Keep the battery covers of electronic devices such as remote controls taped closed  Store all batteries and toxic materials where children cannot get to them  Use childproof locks to keep children away from dangerous materials  Manage or prevent esophagitis:   · Limit or do not eat foods that can lead to esophagitis  Foods such as oranges and salsa can irritate your esophagus  Caffeine and chocolate can cause acid reflux  High-fat and fried foods make your stomach digest food more slowly  This increases the amount of stomach acid your esophagus is exposed to  Eat small meals, and drink water with your meals  Soft foods such as yogurt and applesauce may help soothe your throat  Do not eat for at least 3 hours before you go to bed  · Prevent acid reflux  Do not bend over unless it is necessary  Acid may back up into your esophagus when you bend over  If possible, keep the head of your bed elevated while you sleep  This will help keep acid from backing up  Manage stress  Stress can make your symptoms worse or cause stomach acid to back up  · Drink more liquid when you take pills  Drink a full glass of water when you take your pills  Ask your healthcare provider if you can take your pills at least an hour before you go to bed  © 2017 [address] [facility] Information is for End User's use only and may not be sold, redistributed or otherwise used for commercial purposes  All illustrations and images included in CareNotes® are the copyrighted property of [employer]  or [employer]  The above information is an  only  It is not intended as medical advice for individual conditions or treatments  Talk to your doctor, nurse or pharmacist before following any medical regimen to see if it is safe and effective for you  Dyspnea   WHAT YOU NEED TO KNOW:   Dyspnea is breathing difficulty or discomfort  You may have labored, painful, or shallow breathing  You may feel breathless or short of breath  Dyspnea can occur during rest or with activity  You may have dyspnea for a short time, or it might become chronic  Dyspnea is often a symptom of a disease or condition  DISCHARGE INSTRUCTIONS:   Seek care immediately if:   · Your signs and symptoms are the same or worse within 24 hours of treatment  · You have shaking chills or a fever over 102°F      · You have new pain, pressure, or tightness in your chest      · You have a new or worse cough or wheezing, or you cough up blood  · You feel like you cannot get enough air  · The skin over your ribs or on your neck sinks in when you breathe  · You have a severe headache with vomiting and abdominal pain  · You feel confused or dizzy  Contact your healthcare provider or specialist if:   · You have questions or concerns about your condition or care  Medicines:   · Medicines  may be used to treat the cause of your dyspnea  Medicines may reduce swelling in your airway or decrease extra fluid from around your heart or lungs  Other medicines may be used to decrease anxiety and help you feel calm and relaxed  · Take your medicine as directed  Contact your healthcare provider if you think your medicine is not helping or if you have side effects  Tell him or her if you are allergic to any medicine  Keep a list of the medicines, vitamins, and herbs you take  Include the amounts, and when and why you take them  Bring the list or the pill bottles to follow-up visits  Carry your medicine list with you in case of an emergency  Manage long-term dyspnea:   · Create an action plan  You and your healthcare provider can work together to create a plan for how to handle episodes of dyspnea  The plan can include daily activities, treatment changes, and what to do if you have severe breathing problems  · Lean forward on your elbows when you sit  This helps your lungs expand and may make it easier to breathe  · Use pursed-lip breathing any time you feel short of breath    Breathe in through your nose and then slowly breathe out through your mouth with your lips slightly puckered  It should take you twice as long to breathe out as it did to breathe in  · Do not smoke  Nicotine and other chemicals in cigarettes and cigars can cause lung damage and make it harder to breathe  Ask your healthcare provider for information if you currently smoke and need help to quit  E-cigarettes or smokeless tobacco still contain nicotine  Talk to your healthcare provider before you use these products  · Reach or maintain a healthy weight  Your healthcare provider can help you create a safe weight loss plan if you are overweight  · Exercise as directed  Exercise can help your lungs work more easily  Exercise can also help you lose weight if needed  Try to get at least 30 minutes of exercise most days of the week  Your healthcare provider can help you create an exercise plan that is safe for you  Follow up with your healthcare provider or specialist as directed:  Write down your questions so you remember to ask them during your visits  © 2017 [address] [facility] Information is for End User's use only and may not be sold, redistributed or otherwise used for commercial purposes  All illustrations and images included in CareNotes® are the copyrighted property of [employer]  or [employer]  The above information is an  only  It is not intended as medical advice for individual conditions or treatments  Talk to your doctor, nurse or pharmacist before following any medical regimen to see if it is safe and effective for you

## 2020-08-20 NOTE — ED PROVIDER NOTES
History  Chief Complaint   Patient presents with    Shortness of Breath     pt c/o feeling SOB since sunday 08/16  states that he was seen and sent home earlier this week     Patient is a 32-year-old male with past medical history of COPD, hyperlipidemia, prior kidney stones, arthritis, abdominal aortic aneurysm status post surgical repair, chronic low back pain, presents to the emergency department for dyspnea  Patient was seen here on 08/18 after he had reported he has been short of breath since Sunday, 8/16  He states the dyspnea is constant however it does wax and wane in severity  He notices that it is worse when he is lying flat but cannot comment on if it is worse with exertion as opposed to rest and states it flares up either way  He also feels constriction in his throat region and states he has been having more painful and difficulty swallowing  Initially he thought he was having allergic reaction when he was seen on 08/18 but clinically patient not presenting with acute allergic reaction  He did recently have a steroid injection in his back but denies any recent oral steroid use  He was ultimately discharged after he refused admission on 08/18  He followed with his PCP yesterday who started him on omeprazole as patient has been having worsening indigestion  He was also given a script for repeat blood work and a CT soft tissue neck with and without contrast which she has yet to do  He was also given referral for Ear Nose Throat but has not followed up as of yet  Patient states his symptoms feel significantly different from prior COPD exacerbations and does not think this is COPD related  He denies any relief with home nebulizer treatment  He has oxygen at home p r n  but has not felt he needed it    He denies any fever, shaking chills, headache, dizziness or near syncope, drooling or difficulty handling oral secretions, runny nose, congestion, change in chronic cough, hemoptysis, chest pain, palpitations, abdominal pain, nausea, vomiting, diarrhea, constipation, blood per rectum or melena, dysuria, change in urinary frequency, hematuria, flank pain, skin rash or color change, extremity swelling or pain, lateralizing extremity weakness or paresthesia or other focal neurologic deficits  Denies any cardiac history  He is a former heavy smoker  History provided by:  Patient, spouse and medical records   used: No    Shortness of Breath   Associated symptoms: cough and sore throat    Associated symptoms: no abdominal pain, no chest pain, no diaphoresis, no ear pain, no fever, no headaches, no neck pain, no rash, no vomiting and no wheezing        Prior to Admission Medications   Prescriptions Last Dose Informant Patient Reported? Taking?    ANORO ELLIPTA 62 5-25 MCG/INH AEPB  Self Yes No   Sig: Inhale 1 puff daily   Respiratory Therapy Supplies (NEBULIZER/ADULT MASK) KIT  Self No No   Sig: by Does not apply route daily as needed (sob)   Patient not taking: Reported on 2020   VENTOLIN  (90 Base) MCG/ACT inhaler   No No   Sig: Inhale 2 puffs every 4 (four) hours as needed for shortness of breath   atorvastatin (LIPITOR) 20 mg tablet   No No   Sig: Take 1 tablet (20 mg total) by mouth daily   betamethasone, augmented, (DIPROLENE) 0 05 % lotion   No No   Sig: Apply topically daily To scalp   celecoxib (CeleBREX) 200 mg capsule   No No   Sig: Take 1 capsule (200 mg total) by mouth 2 (two) times a day   Patient not taking: Reported on 2020   fluticasone (FLONASE) 50 mcg/act nasal spray   No No   Si sprays into each nostril daily   omeprazole (PriLOSEC) 20 mg delayed release capsule   No No   Sig: Take 1 capsule (20 mg total) by mouth daily   rosuvastatin (CRESTOR) 20 MG tablet   No No   Sig: Take 1 tablet (20 mg total) by mouth daily   Patient not taking: Reported on 2020   selenium sulfide (SELSUN) 2 5 % shampoo  Self No No   Sig: Apply topically daily as needed for dandruff Apply to scalp , rinse after 5 minutes  repeat   Patient not taking: Reported on 8/19/2020   triamcinolone (KENALOG) 0 1 % cream  Self No No   Sig: Apply topically 3 (three) times a day   Patient not taking: Reported on 8/19/2020      Facility-Administered Medications: None       Past Medical History:   Diagnosis Date    Arthritis     COPD (chronic obstructive pulmonary disease) ([facility] [address] )     Kidney stones        Past Surgical History:   Procedure Laterality Date    ABDOMINAL AORTIC ANEURYSM REPAIR      ENDOVASC REPAIR [address] PLACE VISCERAL EXTENS PROSTH    LAST ASSESSED [id number]    COLONOSCOPY  03/09/2011    HAND SURGERY      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        Family History   Problem Relation Age of Onset    Heart failure Mother     Diabetes Mother     Other Mother         MENTAL DISORDER      I have reviewed and agree with the history as documented  E-Cigarette/Vaping    E-Cigarette Use Never User      E-Cigarette/Vaping Substances     Social History     Tobacco Use    Smoking status: Former Smoker    Smokeless tobacco: Never Used   Substance Use Topics    Alcohol use: Yes     Frequency: Monthly or less     Drinks per session: 1 or 2     Binge frequency: Never     Comment: OCCASIONAL     Drug use: No       Review of Systems   Constitutional: Negative for chills, diaphoresis and fever  HENT: Positive for sore throat and trouble swallowing  Negative for congestion, dental problem, drooling, ear pain, postnasal drip, rhinorrhea and voice change  Respiratory: Positive for cough and shortness of breath  Negative for chest tightness, wheezing and stridor  Cardiovascular: Negative for chest pain, palpitations and leg swelling  Gastrointestinal: Negative for abdominal distention, abdominal pain, blood in stool, constipation, diarrhea, nausea and vomiting  Genitourinary: Negative for dysuria, flank pain, frequency and hematuria     Musculoskeletal: Negative for back pain, neck pain and neck stiffness  Skin: Negative for color change, pallor, rash and wound  Allergic/Immunologic: Negative for immunocompromised state  Neurological: Negative for dizziness, syncope, weakness, light-headedness, numbness and headaches  Hematological: Negative for adenopathy  Psychiatric/Behavioral: Negative for confusion and decreased concentration  All other systems reviewed and are negative  Physical Exam  Physical Exam  Vitals signs and nursing note reviewed  Constitutional:       General: He is not in acute distress  Appearance: He is well-developed  He is not ill-appearing, toxic-appearing or diaphoretic  HENT:      Head: Normocephalic and atraumatic  Right Ear: External ear normal       Left Ear: External ear normal       Nose: Nose normal       Mouth/Throat:      Mouth: Mucous membranes are moist       Pharynx: Oropharynx is clear  Posterior oropharyngeal erythema present  Comments: Posterior oropharynx mildly erythematous but otherwise clear without exudate and without evidence of edema  No tonsillar hypertrophy  Uvula midline  Grade 1 Mallampati view  Patient speaking and handling oral secretions without difficulty  There are 2-3 small white cancre sores on the right side of the buccal mucosa  Eyes:      Conjunctiva/sclera: Conjunctivae normal       Pupils: Pupils are equal, round, and reactive to light  Neck:      Musculoskeletal: Normal range of motion and neck supple  No muscular tenderness  Vascular: No JVD  Comments: No palpable thyromegaly  Normal swallow reflex  Cardiovascular:      Rate and Rhythm: Normal rate and regular rhythm  Pulses: Normal pulses  Heart sounds: Normal heart sounds  No murmur  No friction rub  No gallop  Pulmonary:      Effort: No respiratory distress  Breath sounds: No stridor  Rhonchi present  No wheezing or rales        Comments: Patient appears mildly tachypneic but not having any accessory muscle use  He is satting 96% on room air  Very faint expiratory rhonchi in bilateral bases but otherwise there is good air movement and clear lungs otherwise  Patient able to speak in full sentences  Chest:      Chest wall: No tenderness  Abdominal:      General: Bowel sounds are normal  There is no distension  Palpations: Abdomen is soft  There is no mass  Tenderness: There is no abdominal tenderness  There is no guarding or rebound  Musculoskeletal: Normal range of motion  General: No tenderness  Lymphadenopathy:      Cervical: No cervical adenopathy  Skin:     General: Skin is warm and dry  Coloration: Skin is not pale  Findings: No rash  Neurological:      Mental Status: He is alert and oriented to person, place, and time  Cranial Nerves: No cranial nerve deficit  Psychiatric:         Behavior: Behavior normal          Thought Content:  Thought content normal          Vital Signs  ED Triage Vitals [08/20/20 1005]   Temperature Pulse Respirations Blood Pressure SpO2   98 2 °F (36 8 °C) 78 22 (!) 194/104 96 %      Temp src Heart Rate Source Patient Position - Orthostatic VS BP Location FiO2 (%)   -- Monitor Lying Right arm --      Pain Score       --         Vitals:    08/20/20 1030 08/20/20 1045 08/20/20 1158 08/20/20 1354   BP: 167/90 (!) 177/83 (!) 150/102 (!) 162/119   BP Location: Left arm Left arm Right arm Left arm   Pulse: 74 72 74 68   Resp: (!) 23 20 19 17   Temp:       SpO2: 96% 96% 97% 94%   Weight:           Visual Acuity      ED Medications  Medications   iohexol (OMNIPAQUE) 350 MG/ML injection (MULTI-DOSE) 100 mL (100 mL Intravenous Given 8/20/20 1040)   aluminum-magnesium hydroxide-simethicone (MYLANTA) 200-200-20 mg/5 mL oral suspension 30 mL (30 mL Oral Given 8/20/20 1157)   Lidocaine Viscous HCl (XYLOCAINE) 2 % mucosal solution 10 mL (10 mL Swish & Swallow Given 8/20/20 1157)       Diagnostic Studies  Results Reviewed     Procedure Component Value Units Date/Time    Troponin I [[id number]]  (Normal) Collected:  08/20/20 1353    Lab Status:  Final result Specimen:  Blood from Arm, Right Updated:  08/20/20 1425     Troponin I <0 02 ng/mL     Strep A PCR [[id number]]  (Normal) Collected:  08/20/20 1026    Lab Status:  Final result Specimen:  Throat Updated:  08/20/20 1106     STREP A PCR None Detected    NT-BNP PRO [[id number]]  (Normal) Collected:  08/20/20 1021    Lab Status:  Final result Specimen:  Blood from Arm, Right Updated:  08/20/20 1101     NT-proBNP 119 pg/mL     Hepatic function panel [[id number]]  (Abnormal) Collected:  08/20/20 1021    Lab Status:  Final result Specimen:  Blood from Arm, Right Updated:  08/20/20 1101     Total Bilirubin 0 40 mg/dL      Bilirubin, Direct 0 10 mg/dL      Alkaline Phosphatase 58 U/L      AST 15 U/L      ALT 46 U/L      Total Protein 6 7 g/dL      Albumin 3 4 g/dL     Magnesium [[id number]]  (Normal) Collected:  08/20/20 1021    Lab Status:  Final result Specimen:  Blood from Arm, Right Updated:  08/20/20 1101     Magnesium 1 9 mg/dL     Troponin I [[id number]]  (Normal) Collected:  08/20/20 1021    Lab Status:  Final result Specimen:  Blood from Arm, Right Updated:  08/20/20 1056     Troponin I <0 02 ng/mL     Basic metabolic panel [[id number]]  (Abnormal) Collected:  08/20/20 1021    Lab Status:  Final result Specimen:  Blood from Arm, Right Updated:  08/20/20 1052     Sodium 137 mmol/L      Potassium 4 5 mmol/L      Chloride 103 mmol/L      CO2 27 mmol/L      ANION GAP 7 mmol/L      BUN 33 mg/dL      Creatinine 1 28 mg/dL      Glucose 133 mg/dL      Calcium 8 7 mg/dL      eGFR 54 ml/min/1 73sq m     Narrative:       Margot guidelines for Chronic Kidney Disease (CKD):     Stage 1 with normal or high GFR (GFR > 90 mL/min/1 73 square meters)    Stage 2 Mild CKD (GFR = 60-89 mL/min/1 73 square meters)    Stage 3A Moderate CKD (GFR = 45-59 mL/min/1 73 square meters)    Stage 3B Moderate CKD (GFR = 30-44 mL/min/1 73 square meters)    Stage 4 Severe CKD (GFR = 15-29 mL/min/1 73 square meters)    Stage 5 End Stage CKD (GFR <15 mL/min/1 73 square meters)  Note: GFR calculation is accurate only with a steady state creatinine    Protime-INR [[id number]]  (Normal) Collected:  08/20/20 1021    Lab Status:  Final result Specimen:  Blood from Arm, Right Updated:  08/20/20 1042     Protime 13 6 seconds      INR 1 02    APTT [[id number]]  (Normal) Collected:  08/20/20 1021    Lab Status:  Final result Specimen:  Blood from Arm, Right Updated:  08/20/20 1042     PTT 25 seconds     Blood gas, venous [[id number]]  (Abnormal) Collected:  08/20/20 1021    Lab Status:  Final result Specimen:  Blood from Arm, Right Updated:  08/20/20 1040     pH, Dung 7 382     pCO2, Dung 39 1 mm Hg      pO2, Dung 60 6 mm Hg      HCO3, Dung 22 7 mmol/L      Base Excess, Dung -2 0 mmol/L      O2 Content, Dung 22 4 ml/dL      O2 HGB, VENOUS 89 7 %     CBC and differential [[id number]]  (Abnormal) Collected:  08/20/20 1021    Lab Status:  Final result Specimen:  Blood from Arm, Right Updated:  08/20/20 1032     WBC 20 58 Thousand/uL      RBC 5 61 Million/uL      Hemoglobin 17 2 g/dL      Hematocrit 51 3 %      MCV 91 fL      MCH 30 7 pg      MCHC 33 5 g/dL      RDW 13 1 %      MPV 11 4 fL      Platelets 552 Thousands/uL      nRBC 0 /100 WBCs      Neutrophils Relative 86 %      Immat GRANS % 1 %      Lymphocytes Relative 5 %      Monocytes Relative 8 %      Eosinophils Relative 0 %      Basophils Relative 0 %      Neutrophils Absolute 17 53 Thousands/µL      Immature Grans Absolute 0 28 Thousand/uL      Lymphocytes Absolute 1 05 Thousands/µL      Monocytes Absolute 1 68 Thousand/µL      Eosinophils Absolute 0 00 Thousand/µL      Basophils Absolute 0 04 Thousands/µL                  CTA ED chest PE study   Final Result by Nas Lynn MD (08/20 1105)      No pulmonary embolus  No acute pulmonary disease        Mild upper lobe predominant centrilobular emphysema and severe lower lobe predominant panlobular emphysema with mild lower lobe bronchiectasis  Lower lobe predominant emphysema and bronchiectasis can be seen with alpha-1 antitrypsin deficiency  The study was marked in [facility] for immediate notification  Workstation performed: [id number]         CT soft tissue neck w contrast   Final Result by Wei Gutierrez MD (08/20 1113)      No discrete nodular enhancing lesions identified along the course of the aerodigestive tract  Possible mild esophageal wall thickening should be correlated clinically for the presence of esophagitis        Workstation performed: [id number]                    Procedures  ECG 12 Lead Documentation Only    Date/Time: 8/20/2020 10:25 AM  Performed by: Khurram Manzo DO  Authorized by: Khurram Manzo DO     ECG reviewed by me, the ED Provider: yes    Patient location:  ED  Previous ECG:     Previous ECG:  Compared to current    Comparison ECG info:  8-    Similarity:  No change  Rate:     ECG rate:  69    ECG rate assessment: normal    Rhythm:     Rhythm: sinus rhythm      Rhythm comment:  Sinus arrhythmia  Ectopy:     Ectopy: none    QRS:     QRS axis:  Left    QRS intervals:  Normal  Conduction:     Conduction: abnormal      Abnormal conduction: LAFB    ST segments:     ST segments:  Normal  T waves:     T waves: normal      ECG 12 Lead Documentation Only    Date/Time: 8/20/2020 1:11 PM  Performed by: Khurram Manzo DO  Authorized by: Khurram Manzo DO     ECG reviewed by me, the ED Provider: yes    Patient location:  ED  Previous ECG:     Previous ECG:  Compared to current    Similarity:  No change  Rate:     ECG rate:  61    ECG rate assessment: normal    Rhythm:     Rhythm: sinus rhythm    Ectopy:     Ectopy: none    QRS:     QRS axis:  Left    QRS intervals:  Normal  Conduction:     Conduction: normal    ST segments:     ST segments:  Normal  T waves:     T waves: normal ED Course  ED Course as of Aug 20 1434   u Aug 20, 2020   1108 Increased from 15,000 two days ago  This could be secondary to recent steroid injection or possibly concomitant infection or inflammatory response  WBC(!): 20 58   1152 Updated patient and wife about CT findings showing severe emphysema and possible bronchiectasis  No pulmonary embolism or evidence of pneumonia  I did update him that it does show evidence of esophagitis which is likely what is causing his throat discomfort  He states that the throat discomfort and breathing go hand in hand  I advised observation admission for pulmonology and possibly GI consult however patient adamantly does not want to be admitted  He has decision making capabilities, is not altered or impaired mentally in any way  I did discussed the risk of leaving against medical advice including worsening dyspnea, hypoxia, organ failure and death  Patient verbalized his understanding of these risks  He is agreeable to staying for repeat troponin and EKG  Will give referral for GI and pulmonology  PCP has already started patient on omeprazole and I advised him to continue taking this  1225 Patient feeling much better after Mylanta and viscous lidocaine  1433 Patient feeling a lot better  Updated of repeat troponin and EKG being normal   Encouraged him to follow up with his PCP, with pulmonology and Gastroenterology  PCP also referred to ENT  Discussed ED return parameters  US AUDIT      Most Recent Value   Initial Alcohol Screen: US AUDIT-C    1  How often do you have a drink containing alcohol?  0 Filed at: 08/20/2020 1003   2  How many drinks containing alcohol do you have on a typical day you are drinking? 0 Filed at: 08/20/2020 1003   3b  FEMALE Any Age, or MALE 65+: How often do you have 4 or more drinks on one occassion?   0 Filed at: 08/20/2020 1003   Audit-C Score  0 Filed at: 08/20/2020 1003              Identification of Seniors at Risk      Most Recent Value   (ISAR) Identification of Seniors at Risk   Before the illness or injury that brought you to the Emergency, did you need someone to help you on a regular basis? 0 Filed at: 08/20/2020 1005   In the last 24 hours, have you needed more help than usual?  0 Filed at: 08/20/2020 1005   Have you been hospitalized for one or more nights during the past 6 months? 0 Filed at: 08/20/2020 1005   In general, do you see well?  0 Filed at: 08/20/2020 1005   In general, do you have serious problems with your memory? 0 Filed at: 08/20/2020 1005   Do you take more than three different medications every day?  0 Filed at: 08/20/2020 1005   ISAR Score  0 Filed at: 08/20/2020 1005          KARIE/DAST-10      Most Recent Value   How many times in the past year have you    Used an illegal drug or used a prescription medication for non-medical reasons? Never Filed at: 08/20/2020 1003                    Wells' Criteria for PE      Most Recent Value   Wells' Criteria for PE   Clinical signs and symptoms of DVT  0 Filed at: 08/20/2020 1029   PE is primary diagnosis or equally likely  3 Filed at: 08/20/2020 1029   HR >100  0 Filed at: 08/20/2020 1029   Immobilization at least 3 days or Surgery in the previous 4 weeks  0 Filed at: 08/20/2020 1029   Previous, objectively diagnosed PE or DVT  0 Filed at: 08/20/2020 1029   Hemoptysis  0 Filed at: 08/20/2020 1029   Malignancy with treatment within 6 months or palliative  0 Filed at: 08/20/2020 1029   Andrés Jalloh Criteria Total  3 Filed at: 08/20/2020 1029                  MDM  Number of Diagnoses or Management Options  Diagnosis management comments: 61-year-old male with history of COPD, hyperlipidemia, presents to the ED for 5 days of dyspnea and difficulty/painful swallowing  He does not have significant bronchospasm on exam and patient states his dyspnea feels very different from prior COPD exacerbations    He was worked up extensively in the ED 2 days ago with CTA chest, abdomen and pelvis dissection protocol which was essentially negative  He had normal labs other than hyponatremia with sodium of 129  It was recommended that he be admitted but patient did not want to stay in the hospital   He was seen by PCP yesterday however returns today prior to getting the outpatient workup PCP recommended  He states his dyspnea continues  He is not in any acute distress and hemodynamically stable  Will workup with CT soft tissue neck given that this was recently ordered as outpatient and I do feel it is necessary at this time to rule out mass or other obvious abnormality in the throat  Will also obtain CTA of the chest for PE  Will check cardiac labs including BNP  Offered a neb treatment for symptom relief but patient declined at this time  Amount and/or Complexity of Data Reviewed  Clinical lab tests: ordered and reviewed  Tests in the radiology section of CPT®: ordered and reviewed  Tests in the medicine section of CPT®: ordered and reviewed  Obtain history from someone other than the patient: yes  Review and summarize past medical records: yes  Independent visualization of images, tracings, or specimens: yes          Disposition  Final diagnoses:   Globus sensation   Esophagitis   Dyspnea     Time reflects when diagnosis was documented in both MDM as applicable and the Disposition within this note     Time User Action Codes Description Comment    8/20/2020  2:31 PM Oscar November E Add [R09 89] Globus sensation     8/20/2020  2:31 PM Oscar November E Add [K20 9] Esophagitis     8/20/2020  2:32 PM Oscar November E Add [R06 00] Dyspnea       ED Disposition     ED Disposition Condition Date/Time Comment    Discharge Stable u Aug 20, 2020  2:32 PM Germaine Greenberg discharge to home/self care              Follow-up Information     Follow up With Specialties Details Why Contact Info Additional Information    Kena Flores MD Family Medicine Schedule an appointment as soon as possible for a visit   25 [facility] [address] [facility] 77 Pulmonology Schedule an appointment as soon as possible for a visit   Kaitlin [address] Pulmonary [address], [place], [place], [address] [facility] Gastroenterology Schedule an appointment as soon as possible for a visit   [address] [id number]  [address] [facility], [address]  [address] [facility], [facility], [place], [address] [facility] Emergency Medicine Go to  If symptoms worsen 34 [facility] Aisha Patele [address] ED, [address] [place], [place], [address]          Patient's Medications   Discharge Prescriptions    ALUMINUM-MAGNESIUM HYDROXIDE-SIMETHICONE (MAALOX MAX) 400-400-40 MG/5ML SUSPENSION    Take 20 mL by mouth every 8 (eight) hours as needed for indigestion or heartburn       Start Date: 8/20/2020 End Date: --       Order Dose: 20 mL       Quantity: 355 mL    Refills: 0     No discharge procedures on file      PDMP Review     None          ED Provider  Electronically Signed by           Irineo Boykin,   08/20/20 [id number]

## 2020-08-24 ENCOUNTER — VBI (OUTPATIENT)
Dept: FAMILY MEDICINE CLINIC | Facility: CLINIC | Age: 80
End: 2020-08-24

## 2020-08-24 NOTE — TELEPHONE ENCOUNTER
Anisa Him    ED Visit Information     Ed visit date: 8/20/20  Diagnosis Description: Globus sensation; Esophagitis; Dyspnea  In Network? Yes [place]  Discharge status: Home  Discharged with meds ? Yes  Number of ED visits to date: 2  ED Severity:N/A     Outreach Information    Outreach successful: Yes 1  Date letter mailed:0  Date Finalized:8/24/20    Care Coordination    Follow up appointment with pcp: no patient wife declined PCP follow up at this time  Transportation issues ? No    [employer] type:  3 Day Outreach  Emergent necessity warranted by diagnosis:  Yes  [facility] PCP:  Yes  Transportation:  Friend/Family Transport  Called PCP first?:  No  Feels able to call PCP for urgent problems ?:  Yes  Understands what emergencies can be handled by PCP ?:  Yes  Ever any problems getting appointment with PCP for minor emergency/urgency problems?:  No  Practice Contacted Patient ?:  No  Pt had ED follow up with pcp/staff ?:  No    Seen for follow-up out of network ?:  No  Reason Patient went to ED instead of Urgent Care or PCP?:  Perceived Severity of Illness  Urgent care Education?:  Yes  08/24/2020 12:00 PM Phone (VBI) Yana Caputo (Self) [phone] (H) Remove  By David Hernandez communication with patients wife regarding recent ED visit on 8/20/20  Patients wife stated that patient is doing much better   Patients wife declined PCP follow-up at this time  Patient does not meet OPCM criteria  Patients wife is unaware of PCP after hour's on-call service, education given  Patients wife was unaware of her nearest [place] [address] urgent care facility, education given  Patients wife does feel comfortable contacting PCP office for medical advice and does not have issues with getting a [address] [facility] or next day appointment

## 2020-08-25 ENCOUNTER — TELEPHONE (OUTPATIENT)
Dept: FAMILY MEDICINE CLINIC | Facility: CLINIC | Age: 80
End: 2020-08-25

## 2020-08-25 NOTE — TELEPHONE ENCOUNTER
Head and neck soft tissue denied by insurance but he had it completed in the ED  Reviewed results and recommend he continue awaiting the referral to ENT giving his smoking history  Also, will recommend he make an appointment with his GI doctor as CT did show esophagitis   I cannot see notes from GI but see he has seen GI in [place]

## 2020-09-26 DIAGNOSIS — J44.9 CHRONIC OBSTRUCTIVE PULMONARY DISEASE, UNSPECIFIED COPD TYPE (HCC): ICD-10-CM

## 2020-09-28 DIAGNOSIS — J44.9 CHRONIC OBSTRUCTIVE PULMONARY DISEASE, UNSPECIFIED COPD TYPE (HCC): ICD-10-CM

## 2020-11-06 ENCOUNTER — APPOINTMENT (EMERGENCY)
Dept: RADIOLOGY | Facility: HOSPITAL | Age: 80
DRG: 190 | End: 2020-11-06
Payer: COMMERCIAL

## 2020-11-06 ENCOUNTER — HOSPITAL ENCOUNTER (INPATIENT)
Facility: HOSPITAL | Age: 80
LOS: 1 days | Discharge: HOME | DRG: 190 | End: 2020-11-07
Attending: EMERGENCY MEDICINE | Admitting: INTERNAL MEDICINE
Payer: COMMERCIAL

## 2020-11-06 ENCOUNTER — TELEPHONE (OUTPATIENT)
Dept: FAMILY MEDICINE CLINIC | Facility: CLINIC | Age: 80
End: 2020-11-06

## 2020-11-06 DIAGNOSIS — J44.1 COPD EXACERBATION (HCC): Primary | ICD-10-CM

## 2020-11-06 PROBLEM — J96.01 ACUTE RESPIRATORY FAILURE WITH HYPOXIA (HCC): Status: ACTIVE | Noted: 2020-11-06

## 2020-11-06 LAB
ALBUMIN SERPL BCP-MCNC: 3.7 G/DL (ref 3.5–5)
ALP SERPL-CCNC: 77 U/L (ref 46–116)
ALT SERPL W P-5'-P-CCNC: 32 U/L (ref 12–78)
ANION GAP SERPL CALCULATED.3IONS-SCNC: 7 MMOL/L (ref 4–13)
APTT PPP: 29 SECONDS (ref 23–37)
AST SERPL W P-5'-P-CCNC: 17 U/L (ref 5–45)
BASOPHILS # BLD AUTO: 0.03 THOUSANDS/ΜL (ref 0–0.1)
BASOPHILS NFR BLD AUTO: 0 % (ref 0–1)
BILIRUB SERPL-MCNC: 1 MG/DL (ref 0.2–1)
BUN SERPL-MCNC: 13 MG/DL (ref 5–25)
CALCIUM SERPL-MCNC: 9.4 MG/DL (ref 8.3–10.1)
CHLORIDE SERPL-SCNC: 103 MMOL/L (ref 100–108)
CO2 SERPL-SCNC: 30 MMOL/L (ref 21–32)
CREAT SERPL-MCNC: 1.15 MG/DL (ref 0.6–1.3)
EOSINOPHIL # BLD AUTO: 0.33 THOUSAND/ΜL (ref 0–0.61)
EOSINOPHIL NFR BLD AUTO: 4 % (ref 0–6)
ERYTHROCYTE [DISTWIDTH] IN BLOOD BY AUTOMATED COUNT: 13.8 % (ref 11.6–15.1)
GFR SERPL CREATININE-BSD FRML MDRD: 61 ML/MIN/1.73SQ M
GLUCOSE SERPL-MCNC: 116 MG/DL (ref 65–140)
HCT VFR BLD AUTO: 50.9 % (ref 36.5–49.3)
HGB BLD-MCNC: 16.8 G/DL (ref 12–17)
IMM GRANULOCYTES # BLD AUTO: 0.01 THOUSAND/UL (ref 0–0.2)
IMM GRANULOCYTES NFR BLD AUTO: 0 % (ref 0–2)
INR PPP: 1.1 (ref 0.84–1.19)
LACTATE SERPL-SCNC: 0.8 MMOL/L (ref 0.5–2)
LYMPHOCYTES # BLD AUTO: 1.05 THOUSANDS/ΜL (ref 0.6–4.47)
LYMPHOCYTES NFR BLD AUTO: 12 % (ref 14–44)
MCH RBC QN AUTO: 31.1 PG (ref 26.8–34.3)
MCHC RBC AUTO-ENTMCNC: 33 G/DL (ref 31.4–37.4)
MCV RBC AUTO: 94 FL (ref 82–98)
MONOCYTES # BLD AUTO: 0.92 THOUSAND/ΜL (ref 0.17–1.22)
MONOCYTES NFR BLD AUTO: 11 % (ref 4–12)
NEUTROPHILS # BLD AUTO: 6.35 THOUSANDS/ΜL (ref 1.85–7.62)
NEUTS SEG NFR BLD AUTO: 73 % (ref 43–75)
NRBC BLD AUTO-RTO: 0 /100 WBCS
NT-PROBNP SERPL-MCNC: 95 PG/ML
PLATELET # BLD AUTO: 172 THOUSANDS/UL (ref 149–390)
PMV BLD AUTO: 10.6 FL (ref 8.9–12.7)
POTASSIUM SERPL-SCNC: 4.1 MMOL/L (ref 3.5–5.3)
PROCALCITONIN SERPL-MCNC: <0.05 NG/ML
PROT SERPL-MCNC: 7.4 G/DL (ref 6.4–8.2)
PROTHROMBIN TIME: 13.7 SECONDS (ref 11.6–14.5)
RBC # BLD AUTO: 5.4 MILLION/UL (ref 3.88–5.62)
SARS-COV-2 RNA RESP QL NAA+PROBE: NEGATIVE
SODIUM SERPL-SCNC: 140 MMOL/L (ref 136–145)
TROPONIN I SERPL-MCNC: <0.02 NG/ML
WBC # BLD AUTO: 8.69 THOUSAND/UL (ref 4.31–10.16)

## 2020-11-06 PROCEDURE — 71045 X-RAY EXAM CHEST 1 VIEW: CPT

## 2020-11-06 PROCEDURE — 96374 THER/PROPH/DIAG INJ IV PUSH: CPT

## 2020-11-06 PROCEDURE — 84145 PROCALCITONIN (PCT): CPT | Performed by: EMERGENCY MEDICINE

## 2020-11-06 PROCEDURE — 99223 1ST HOSP IP/OBS HIGH 75: CPT | Performed by: INTERNAL MEDICINE

## 2020-11-06 PROCEDURE — 36415 COLL VENOUS BLD VENIPUNCTURE: CPT | Performed by: EMERGENCY MEDICINE

## 2020-11-06 PROCEDURE — 99285 EMERGENCY DEPT VISIT HI MDM: CPT | Performed by: EMERGENCY MEDICINE

## 2020-11-06 PROCEDURE — 85730 THROMBOPLASTIN TIME PARTIAL: CPT | Performed by: EMERGENCY MEDICINE

## 2020-11-06 PROCEDURE — 83605 ASSAY OF LACTIC ACID: CPT | Performed by: EMERGENCY MEDICINE

## 2020-11-06 PROCEDURE — 85025 COMPLETE CBC W/AUTO DIFF WBC: CPT | Performed by: EMERGENCY MEDICINE

## 2020-11-06 PROCEDURE — 94760 N-INVAS EAR/PLS OXIMETRY 1: CPT

## 2020-11-06 PROCEDURE — 84484 ASSAY OF TROPONIN QUANT: CPT | Performed by: EMERGENCY MEDICINE

## 2020-11-06 PROCEDURE — 93005 ELECTROCARDIOGRAM TRACING: CPT

## 2020-11-06 PROCEDURE — 87635 SARS-COV-2 COVID-19 AMP PRB: CPT | Performed by: EMERGENCY MEDICINE

## 2020-11-06 PROCEDURE — 80053 COMPREHEN METABOLIC PANEL: CPT | Performed by: EMERGENCY MEDICINE

## 2020-11-06 PROCEDURE — 99285 EMERGENCY DEPT VISIT HI MDM: CPT

## 2020-11-06 PROCEDURE — 85610 PROTHROMBIN TIME: CPT | Performed by: EMERGENCY MEDICINE

## 2020-11-06 PROCEDURE — 83880 ASSAY OF NATRIURETIC PEPTIDE: CPT | Performed by: EMERGENCY MEDICINE

## 2020-11-06 PROCEDURE — 94640 AIRWAY INHALATION TREATMENT: CPT

## 2020-11-06 PROCEDURE — 87040 BLOOD CULTURE FOR BACTERIA: CPT | Performed by: EMERGENCY MEDICINE

## 2020-11-06 RX ORDER — METHYLPREDNISOLONE SODIUM SUCCINATE 40 MG/ML
40 INJECTION, POWDER, LYOPHILIZED, FOR SOLUTION INTRAMUSCULAR; INTRAVENOUS EVERY 8 HOURS
Status: DISCONTINUED | OUTPATIENT
Start: 2020-11-06 | End: 2020-11-07

## 2020-11-06 RX ORDER — HEPARIN SODIUM 5000 [USP'U]/ML
5000 INJECTION, SOLUTION INTRAVENOUS; SUBCUTANEOUS EVERY 8 HOURS SCHEDULED
Status: DISCONTINUED | OUTPATIENT
Start: 2020-11-06 | End: 2020-11-07 | Stop reason: HOSPADM

## 2020-11-06 RX ORDER — ALBUTEROL SULFATE 2.5 MG/3ML
5 SOLUTION RESPIRATORY (INHALATION) ONCE
Status: COMPLETED | OUTPATIENT
Start: 2020-11-06 | End: 2020-11-06

## 2020-11-06 RX ORDER — BENZONATATE 100 MG/1
100 CAPSULE ORAL 3 TIMES DAILY PRN
Status: DISCONTINUED | OUTPATIENT
Start: 2020-11-06 | End: 2020-11-07 | Stop reason: HOSPADM

## 2020-11-06 RX ORDER — LEVALBUTEROL 1.25 MG/.5ML
1.25 SOLUTION, CONCENTRATE RESPIRATORY (INHALATION)
Status: DISCONTINUED | OUTPATIENT
Start: 2020-11-06 | End: 2020-11-07 | Stop reason: HOSPADM

## 2020-11-06 RX ORDER — ACETAMINOPHEN 325 MG/1
650 TABLET ORAL EVERY 6 HOURS PRN
Status: DISCONTINUED | OUTPATIENT
Start: 2020-11-06 | End: 2020-11-07 | Stop reason: HOSPADM

## 2020-11-06 RX ORDER — SODIUM CHLORIDE FOR INHALATION 0.9 %
3 VIAL, NEBULIZER (ML) INHALATION
Status: DISCONTINUED | OUTPATIENT
Start: 2020-11-06 | End: 2020-11-06

## 2020-11-06 RX ORDER — METHYLPREDNISOLONE SODIUM SUCCINATE 125 MG/2ML
125 INJECTION, POWDER, LYOPHILIZED, FOR SOLUTION INTRAMUSCULAR; INTRAVENOUS ONCE
Status: COMPLETED | OUTPATIENT
Start: 2020-11-06 | End: 2020-11-06

## 2020-11-06 RX ORDER — GUAIFENESIN 600 MG
600 TABLET, EXTENDED RELEASE 12 HR ORAL 2 TIMES DAILY
Status: DISCONTINUED | OUTPATIENT
Start: 2020-11-06 | End: 2020-11-07 | Stop reason: HOSPADM

## 2020-11-06 RX ORDER — ALBUTEROL SULFATE 90 UG/1
2 AEROSOL, METERED RESPIRATORY (INHALATION) EVERY 4 HOURS PRN
Status: DISCONTINUED | OUTPATIENT
Start: 2020-11-06 | End: 2020-11-07 | Stop reason: HOSPADM

## 2020-11-06 RX ADMIN — AZITHROMYCIN 500 MG: 500 INJECTION, POWDER, LYOPHILIZED, FOR SOLUTION INTRAVENOUS at 22:24

## 2020-11-06 RX ADMIN — IPRATROPIUM BROMIDE 0.5 MG: 0.5 SOLUTION RESPIRATORY (INHALATION) at 17:17

## 2020-11-06 RX ADMIN — LEVALBUTEROL HYDROCHLORIDE 1.25 MG: 1.25 SOLUTION, CONCENTRATE RESPIRATORY (INHALATION) at 21:41

## 2020-11-06 RX ADMIN — CEFTRIAXONE SODIUM 1000 MG: 10 INJECTION, POWDER, FOR SOLUTION INTRAVENOUS at 19:20

## 2020-11-06 RX ADMIN — HEPARIN SODIUM 5000 UNITS: 5000 INJECTION INTRAVENOUS; SUBCUTANEOUS at 22:16

## 2020-11-06 RX ADMIN — GUAIFENESIN 600 MG: 600 TABLET ORAL at 19:28

## 2020-11-06 RX ADMIN — IPRATROPIUM BROMIDE 0.5 MG: 0.5 SOLUTION RESPIRATORY (INHALATION) at 21:42

## 2020-11-06 RX ADMIN — METHYLPREDNISOLONE SODIUM SUCCINATE 125 MG: 125 INJECTION, POWDER, FOR SOLUTION INTRAMUSCULAR; INTRAVENOUS at 17:17

## 2020-11-06 RX ADMIN — SODIUM CHLORIDE 500 ML: 0.9 INJECTION, SOLUTION INTRAVENOUS at 19:19

## 2020-11-06 RX ADMIN — ALBUTEROL SULFATE 5 MG: 2.5 SOLUTION RESPIRATORY (INHALATION) at 17:17

## 2020-11-07 VITALS
TEMPERATURE: 97.6 F | HEART RATE: 103 BPM | DIASTOLIC BLOOD PRESSURE: 84 MMHG | SYSTOLIC BLOOD PRESSURE: 161 MMHG | BODY MASS INDEX: 25.07 KG/M2 | OXYGEN SATURATION: 96 % | RESPIRATION RATE: 19 BRPM | WEIGHT: 169.75 LBS

## 2020-11-07 DIAGNOSIS — J04.0 LARYNGITIS: ICD-10-CM

## 2020-11-07 PROBLEM — J96.01 ACUTE RESPIRATORY FAILURE WITH HYPOXIA (HCC): Status: RESOLVED | Noted: 2020-11-06 | Resolved: 2020-11-07

## 2020-11-07 LAB
ANION GAP SERPL CALCULATED.3IONS-SCNC: 8 MMOL/L (ref 4–13)
ATRIAL RATE: 116 BPM
BUN SERPL-MCNC: 14 MG/DL (ref 5–25)
CALCIUM SERPL-MCNC: 9.2 MG/DL (ref 8.3–10.1)
CHLORIDE SERPL-SCNC: 105 MMOL/L (ref 100–108)
CO2 SERPL-SCNC: 27 MMOL/L (ref 21–32)
CREAT SERPL-MCNC: 1.13 MG/DL (ref 0.6–1.3)
ERYTHROCYTE [DISTWIDTH] IN BLOOD BY AUTOMATED COUNT: 13.4 % (ref 11.6–15.1)
GFR SERPL CREATININE-BSD FRML MDRD: 62 ML/MIN/1.73SQ M
GLUCOSE SERPL-MCNC: 167 MG/DL (ref 65–140)
HCT VFR BLD AUTO: 43.2 % (ref 36.5–49.3)
HGB BLD-MCNC: 14.5 G/DL (ref 12–17)
MCH RBC QN AUTO: 31.3 PG (ref 26.8–34.3)
MCHC RBC AUTO-ENTMCNC: 33.6 G/DL (ref 31.4–37.4)
MCV RBC AUTO: 93 FL (ref 82–98)
P AXIS: 76 DEGREES
PLATELET # BLD AUTO: 158 THOUSANDS/UL (ref 149–390)
PLATELET # BLD AUTO: 173 THOUSANDS/UL (ref 149–390)
PMV BLD AUTO: 10.6 FL (ref 8.9–12.7)
PMV BLD AUTO: 10.8 FL (ref 8.9–12.7)
POTASSIUM SERPL-SCNC: 4.3 MMOL/L (ref 3.5–5.3)
PR INTERVAL: 160 MS
PROCALCITONIN SERPL-MCNC: <0.05 NG/ML
QRS AXIS: -77 DEGREES
QRSD INTERVAL: 74 MS
QT INTERVAL: 320 MS
QTC INTERVAL: 444 MS
RBC # BLD AUTO: 4.64 MILLION/UL (ref 3.88–5.62)
SODIUM SERPL-SCNC: 140 MMOL/L (ref 136–145)
T WAVE AXIS: 78 DEGREES
VENTRICULAR RATE: 116 BPM
WBC # BLD AUTO: 6.1 THOUSAND/UL (ref 4.31–10.16)

## 2020-11-07 PROCEDURE — 94760 N-INVAS EAR/PLS OXIMETRY 1: CPT

## 2020-11-07 PROCEDURE — 85049 AUTOMATED PLATELET COUNT: CPT | Performed by: INTERNAL MEDICINE

## 2020-11-07 PROCEDURE — 84145 PROCALCITONIN (PCT): CPT | Performed by: INTERNAL MEDICINE

## 2020-11-07 PROCEDURE — 80048 BASIC METABOLIC PNL TOTAL CA: CPT | Performed by: INTERNAL MEDICINE

## 2020-11-07 PROCEDURE — 94640 AIRWAY INHALATION TREATMENT: CPT

## 2020-11-07 PROCEDURE — 85027 COMPLETE CBC AUTOMATED: CPT | Performed by: INTERNAL MEDICINE

## 2020-11-07 PROCEDURE — 99239 HOSP IP/OBS DSCHRG MGMT >30: CPT | Performed by: INTERNAL MEDICINE

## 2020-11-07 PROCEDURE — 84145 PROCALCITONIN (PCT): CPT | Performed by: EMERGENCY MEDICINE

## 2020-11-07 PROCEDURE — 93010 ELECTROCARDIOGRAM REPORT: CPT | Performed by: INTERNAL MEDICINE

## 2020-11-07 RX ORDER — AZITHROMYCIN 250 MG/1
250 TABLET, FILM COATED ORAL EVERY 24 HOURS
Status: DISCONTINUED | OUTPATIENT
Start: 2020-11-07 | End: 2020-11-07 | Stop reason: HOSPADM

## 2020-11-07 RX ORDER — PREDNISONE 20 MG/1
40 TABLET ORAL DAILY
Status: DISCONTINUED | OUTPATIENT
Start: 2020-11-07 | End: 2020-11-07 | Stop reason: HOSPADM

## 2020-11-07 RX ORDER — LEVALBUTEROL 1.25 MG/.5ML
1.25 SOLUTION, CONCENTRATE RESPIRATORY (INHALATION) EVERY 6 HOURS PRN
Qty: 30 VIAL | Refills: 0 | Status: SHIPPED | OUTPATIENT
Start: 2020-11-07

## 2020-11-07 RX ORDER — BENZONATATE 100 MG/1
100 CAPSULE ORAL 3 TIMES DAILY PRN
Qty: 20 CAPSULE | Refills: 0 | Status: SHIPPED | OUTPATIENT
Start: 2020-11-07 | End: 2021-03-01

## 2020-11-07 RX ORDER — GUAIFENESIN 600 MG
600 TABLET, EXTENDED RELEASE 12 HR ORAL 2 TIMES DAILY
Qty: 20 TABLET | Refills: 0 | Status: SHIPPED | OUTPATIENT
Start: 2020-11-07 | End: 2021-07-12 | Stop reason: SDUPTHER

## 2020-11-07 RX ORDER — AZITHROMYCIN 250 MG/1
250 TABLET, FILM COATED ORAL EVERY 24 HOURS
Qty: 7 TABLET | Refills: 0 | Status: SHIPPED | OUTPATIENT
Start: 2020-11-07 | End: 2020-11-14

## 2020-11-07 RX ORDER — PREDNISONE 10 MG/1
40 TABLET ORAL DAILY
Qty: 30 TABLET | Refills: 0 | Status: SHIPPED | OUTPATIENT
Start: 2020-11-08 | End: 2020-11-12

## 2020-11-07 RX ADMIN — AZITHROMYCIN 250 MG: 250 TABLET, FILM COATED ORAL at 12:27

## 2020-11-07 RX ADMIN — IPRATROPIUM BROMIDE 0.5 MG: 0.5 SOLUTION RESPIRATORY (INHALATION) at 07:50

## 2020-11-07 RX ADMIN — GUAIFENESIN 600 MG: 600 TABLET ORAL at 10:16

## 2020-11-07 RX ADMIN — LEVALBUTEROL HYDROCHLORIDE 1.25 MG: 1.25 SOLUTION, CONCENTRATE RESPIRATORY (INHALATION) at 07:49

## 2020-11-07 RX ADMIN — METHYLPREDNISOLONE SODIUM SUCCINATE 40 MG: 40 INJECTION, POWDER, FOR SOLUTION INTRAMUSCULAR; INTRAVENOUS at 10:17

## 2020-11-07 RX ADMIN — HEPARIN SODIUM 5000 UNITS: 5000 INJECTION INTRAVENOUS; SUBCUTANEOUS at 05:14

## 2020-11-07 RX ADMIN — METHYLPREDNISOLONE SODIUM SUCCINATE 40 MG: 40 INJECTION, POWDER, FOR SOLUTION INTRAMUSCULAR; INTRAVENOUS at 03:56

## 2020-11-08 LAB — PROCALCITONIN SERPL-MCNC: <0.05 NG/ML

## 2020-11-09 ENCOUNTER — TRANSITIONAL CARE MANAGEMENT (OUTPATIENT)
Dept: FAMILY MEDICINE CLINIC | Facility: CLINIC | Age: 80
End: 2020-11-09

## 2020-11-09 ENCOUNTER — VBI (OUTPATIENT)
Dept: ADMINISTRATIVE | Facility: OTHER | Age: 80
End: 2020-11-09

## 2020-11-09 RX ORDER — OMEPRAZOLE 20 MG/1
CAPSULE, DELAYED RELEASE ORAL
Qty: 90 CAPSULE | Refills: 0 | Status: SHIPPED | OUTPATIENT
Start: 2020-11-09 | End: 2020-11-12 | Stop reason: SDUPTHER

## 2020-11-11 LAB
BACTERIA BLD CULT: NORMAL
BACTERIA BLD CULT: NORMAL

## 2020-11-12 ENCOUNTER — OFFICE VISIT (OUTPATIENT)
Dept: FAMILY MEDICINE CLINIC | Facility: CLINIC | Age: 80
End: 2020-11-12
Payer: COMMERCIAL

## 2020-11-12 VITALS
OXYGEN SATURATION: 93 % | TEMPERATURE: 97.8 F | SYSTOLIC BLOOD PRESSURE: 168 MMHG | HEIGHT: 69 IN | HEART RATE: 81 BPM | WEIGHT: 173 LBS | DIASTOLIC BLOOD PRESSURE: 82 MMHG | BODY MASS INDEX: 25.62 KG/M2

## 2020-11-12 DIAGNOSIS — K21.9 GASTROESOPHAGEAL REFLUX DISEASE, UNSPECIFIED WHETHER ESOPHAGITIS PRESENT: Primary | ICD-10-CM

## 2020-11-12 DIAGNOSIS — I71.4 AAA (ABDOMINAL AORTIC ANEURYSM) WITHOUT RUPTURE (HCC): ICD-10-CM

## 2020-11-12 DIAGNOSIS — E78.5 HYPERLIPIDEMIA, UNSPECIFIED HYPERLIPIDEMIA TYPE: ICD-10-CM

## 2020-11-12 DIAGNOSIS — J04.0 LARYNGITIS: ICD-10-CM

## 2020-11-12 DIAGNOSIS — J96.01 ACUTE RESPIRATORY FAILURE WITH HYPOXIA (HCC): ICD-10-CM

## 2020-11-12 DIAGNOSIS — J44.1 COPD EXACERBATION (HCC): ICD-10-CM

## 2020-11-12 DIAGNOSIS — R03.0 ELEVATED BLOOD PRESSURE, SITUATIONAL: ICD-10-CM

## 2020-11-12 DIAGNOSIS — R73.03 PREDIABETES: ICD-10-CM

## 2020-11-12 PROCEDURE — 99496 TRANSJ CARE MGMT HIGH F2F 7D: CPT | Performed by: FAMILY MEDICINE

## 2020-11-12 PROCEDURE — 1111F DSCHRG MED/CURRENT MED MERGE: CPT | Performed by: FAMILY MEDICINE

## 2020-11-12 RX ORDER — OMEPRAZOLE 20 MG/1
20 CAPSULE, DELAYED RELEASE ORAL DAILY
Qty: 90 CAPSULE | Refills: 5 | Status: SHIPPED | OUTPATIENT
Start: 2020-11-12 | End: 2021-03-01

## 2020-11-25 ENCOUNTER — TELEPHONE (OUTPATIENT)
Dept: PULMONOLOGY | Facility: CLINIC | Age: 80
End: 2020-11-25

## 2020-12-01 DIAGNOSIS — R09.81 HEAD CONGESTION: ICD-10-CM

## 2020-12-01 RX ORDER — FLUTICASONE PROPIONATE 50 MCG
2 SPRAY, SUSPENSION (ML) NASAL DAILY
Qty: 1 BOTTLE | Refills: 5 | Status: SHIPPED | OUTPATIENT
Start: 2020-12-01 | End: 2021-11-01

## 2020-12-28 ENCOUNTER — CONSULT (OUTPATIENT)
Dept: PULMONOLOGY | Facility: CLINIC | Age: 80
End: 2020-12-28
Payer: COMMERCIAL

## 2020-12-28 VITALS
BODY MASS INDEX: 25.77 KG/M2 | WEIGHT: 174 LBS | HEIGHT: 69 IN | TEMPERATURE: 97.5 F | HEART RATE: 98 BPM | OXYGEN SATURATION: 93 % | SYSTOLIC BLOOD PRESSURE: 140 MMHG | RESPIRATION RATE: 18 BRPM | DIASTOLIC BLOOD PRESSURE: 78 MMHG

## 2020-12-28 DIAGNOSIS — J96.11 CHRONIC HYPOXEMIC RESPIRATORY FAILURE (HCC): ICD-10-CM

## 2020-12-28 DIAGNOSIS — J43.2 CENTRILOBULAR EMPHYSEMA (HCC): ICD-10-CM

## 2020-12-28 DIAGNOSIS — J41.0 SIMPLE CHRONIC BRONCHITIS (HCC): Primary | ICD-10-CM

## 2020-12-28 PROCEDURE — 99204 OFFICE O/P NEW MOD 45 MIN: CPT | Performed by: INTERNAL MEDICINE

## 2020-12-28 PROCEDURE — 3078F DIAST BP <80 MM HG: CPT | Performed by: INTERNAL MEDICINE

## 2020-12-28 PROCEDURE — 3077F SYST BP >= 140 MM HG: CPT | Performed by: INTERNAL MEDICINE

## 2020-12-28 PROCEDURE — 1036F TOBACCO NON-USER: CPT | Performed by: INTERNAL MEDICINE

## 2020-12-28 RX ORDER — UMECLIDINIUM BROMIDE AND VILANTEROL TRIFENATATE 62.5; 25 UG/1; UG/1
1 POWDER RESPIRATORY (INHALATION) DAILY
Qty: 1 INHALER | Refills: 5 | Status: SHIPPED | OUTPATIENT
Start: 2020-12-28 | End: 2021-06-22

## 2021-01-06 ENCOUNTER — HOSPITAL ENCOUNTER (OUTPATIENT)
Dept: PULMONOLOGY | Facility: HOSPITAL | Age: 81
Discharge: HOME | End: 2021-01-06
Attending: INTERNAL MEDICINE
Payer: COMMERCIAL

## 2021-01-06 DIAGNOSIS — J43.2 CENTRILOBULAR EMPHYSEMA (HCC): ICD-10-CM

## 2021-01-06 PROCEDURE — 94010 BREATHING CAPACITY TEST: CPT

## 2021-01-06 PROCEDURE — 94727 GAS DIL/WSHOT DETER LNG VOL: CPT

## 2021-01-06 PROCEDURE — 94010 BREATHING CAPACITY TEST: CPT | Performed by: INTERNAL MEDICINE

## 2021-01-06 PROCEDURE — 94618 PULMONARY STRESS TESTING: CPT | Performed by: INTERNAL MEDICINE

## 2021-01-06 PROCEDURE — 94729 DIFFUSING CAPACITY: CPT

## 2021-01-06 PROCEDURE — 94729 DIFFUSING CAPACITY: CPT | Performed by: INTERNAL MEDICINE

## 2021-01-06 PROCEDURE — 94761 N-INVAS EAR/PLS OXIMETRY MLT: CPT

## 2021-01-06 PROCEDURE — 94727 GAS DIL/WSHOT DETER LNG VOL: CPT | Performed by: INTERNAL MEDICINE

## 2021-01-11 ENCOUNTER — TELEPHONE (OUTPATIENT)
Dept: PULMONOLOGY | Facility: CLINIC | Age: 81
End: 2021-01-11

## 2021-01-11 NOTE — TELEPHONE ENCOUNTER
Chest x-ray demonstrates emphysema no pneumonia, this chest x-ray was done in November of 2020 he needs to follow 4-6 weeks from his prior appointment

## 2021-01-13 ENCOUNTER — TELEPHONE (OUTPATIENT)
Dept: PULMONOLOGY | Facility: CLINIC | Age: 81
End: 2021-01-13

## 2021-01-13 NOTE — TELEPHONE ENCOUNTER
Patient is looking for the results of pft  Can you please call him with results when you have a moment? Thank you

## 2021-01-14 ENCOUNTER — OFFICE VISIT (OUTPATIENT)
Dept: PULMONOLOGY | Facility: CLINIC | Age: 81
End: 2021-01-14
Payer: COMMERCIAL

## 2021-01-14 VITALS
TEMPERATURE: 97.5 F | HEART RATE: 91 BPM | DIASTOLIC BLOOD PRESSURE: 84 MMHG | WEIGHT: 174 LBS | BODY MASS INDEX: 25.77 KG/M2 | SYSTOLIC BLOOD PRESSURE: 134 MMHG | OXYGEN SATURATION: 91 % | HEIGHT: 69 IN

## 2021-01-14 DIAGNOSIS — J96.11 CHRONIC HYPOXEMIC RESPIRATORY FAILURE (HCC): ICD-10-CM

## 2021-01-14 DIAGNOSIS — R06.02 SOB (SHORTNESS OF BREATH): ICD-10-CM

## 2021-01-14 DIAGNOSIS — J41.0 SIMPLE CHRONIC BRONCHITIS (HCC): Primary | ICD-10-CM

## 2021-01-14 DIAGNOSIS — J43.2 CENTRILOBULAR EMPHYSEMA (HCC): ICD-10-CM

## 2021-01-14 DIAGNOSIS — J44.9 STAGE 4 VERY SEVERE COPD BY GOLD CLASSIFICATION (HCC): ICD-10-CM

## 2021-01-14 PROCEDURE — 1160F RVW MEDS BY RX/DR IN RCRD: CPT | Performed by: INTERNAL MEDICINE

## 2021-01-14 PROCEDURE — 3079F DIAST BP 80-89 MM HG: CPT | Performed by: INTERNAL MEDICINE

## 2021-01-14 PROCEDURE — 3075F SYST BP GE 130 - 139MM HG: CPT | Performed by: INTERNAL MEDICINE

## 2021-01-14 PROCEDURE — 99214 OFFICE O/P EST MOD 30 MIN: CPT | Performed by: INTERNAL MEDICINE

## 2021-01-14 PROCEDURE — 1036F TOBACCO NON-USER: CPT | Performed by: INTERNAL MEDICINE

## 2021-01-14 NOTE — PROGRESS NOTES
Assessment/Plan:   Diagnoses and all orders for this visit:    Simple chronic bronchitis (HCC)    Centrilobular emphysema (HCC)    SOB (shortness of breath)    Chronic hypoxemic respiratory failure (HCC)    Stage 4 very severe COPD by GOLD classification ([address] )  -     roflumilast (DALIRESP) 250 MCG tablet; Take 1 tablet (250 mcg total) by mouth daily  -     Ambulatory Referral to Pulmonary Rehabilitation; Future        Chronic simple bronchitis with extensive smoking history who quit about 7 years ago  Had a greater than 45 pack-year smoking history  PFTs with severe obstructive ventilatory limitation with an FEV1 of 31% and his lung volumes have been decreased 56% predicted total lung capacity along with DLCO at severely decreased at 30%  6 minutes walk test, could walk a total distance of 213 m in 6 minutes needing supplemental oxygen 2 L of oxygen by nasal cannula  CT of the chest recently done in August of 2020 again reviewed with centrilobular as well as panlobular emphysema with no evidence of any lung nodules  Not a candidate for CT lung screening will repeat CT of the chest as needed p r n  For any indication as needed  He does not tolerate inhaled corticosteroids he states with the history of thrush and sore throat  Discussed with the patient to continue with Anoro 1 puff daily  Discussed the the long-acting and short-acting bronchodilators is in COPD understands and verbalizes  Continue with Ventolin MDI 2 puffs 4 times daily as needed  He also states he does not tolerate albuterol via nebulizer which makes him feel worse and he has not been using it  Also shortness of breath and dyspnea on exertion likely multifactorial  to his COPD severe,  also likely pulmonary hypertension given his extensive COPD    I have asked him to continue with the 2 L of oxygen on exertion as well as nocturnal for now and will follow-up with a 6 minutes walk test   Given multiple exacerbations this year I have discussed with him regarding Daliresp side effects of the medication discussed with the patient Daliresp 250 mg 1 tablet daily  Also discussed regarding pulmonary rehab he will get that scheduled  Not a candidate for lung transplant, I have discussed with him regarding other treatment options with lung volume reduction by valve , wants to think about it at and will readdress again next visit and place in a referral if he agrees  Will follow-up with his echocardiogram that was done in [facility] will try to get his records  Vaccinations up-to-date     Return in about 3 months (around 4/14/2021)  All questions are answered to the patient's satisfaction and understanding  He verbalizes understanding  He is encouraged to call with any further questions or concerns  Portions of the record may have been created with voice recognition software  Occasional wrong word or "sound a like" substitutions may have occurred due to the inherent limitations of voice recognition software  Read the chart carefully and recognize, using context, where substitutions have occurred  Electronically Signed by Kailey Hanson MD    ______________________________________________________________________    Chief Complaint: No chief complaint on file  Patient ID: Edy Jordan is a 68 y o  y o  male has a past medical history of Arthritis, COPD (chronic obstructive pulmonary disease) ([address] ), and Kidney stones  1/14/2021  Patient presents today for follow-up visit    Patient is a very pleasant 68-year-old gentleman, former smoker with greater than 45 pack-year smoking history who quit about 7 years ago, with history of COPD following up at [facility] pulmonology,  He has had 3-4 exacerbations of his COPD this year, and 2 hospital admissions 1 in August and another 1 again in November of this year for a flare-up of COPD  He does have 2 L of oxygen for use on exertion as well as nocturnal   He does states shortness of breath and dyspnea on exertion which has been gradually worsening      Review of Systems   Constitutional: Positive for fatigue  HENT: Negative  Eyes: Negative  Respiratory: Positive for cough ( with white mucoid clear sputum) and shortness of breath (On exertion which is currently stable and it is baseline he states  )  Cardiovascular: Negative  Gastrointestinal: Negative  Endocrine: Negative  Genitourinary: Negative  Musculoskeletal: Negative  Allergic/Immunologic: Negative  Neurological: Negative  Hematological: Negative  Psychiatric/Behavioral: Negative  Smoking history: He reports that he quit smoking about 7 years ago  His smoking use included cigars  He started smoking about 52 years ago  He has a 22 50 pack-year smoking history   He has never used smokeless tobacco     The following portions of the patient's history were reviewed and updated as appropriate: allergies, current medications, past family history, past medical history, past social history, past surgical history and problem list     Immunization History   Administered Date(s) Administered    INFLUENZA 10/04/2018, 11/30/2020    Influenza Split High Dose Preservative Free IM 10/04/2018, 09/19/2019    Pneumococcal Conjugate 13-Valent 02/10/2017, 09/19/2019    Td (adult), Not Adsorbed 11/27/2016     Current Outpatient Medications   Medication Sig Dispense Refill    Anoro Ellipta 62 5-25 MCG/INH inhaler Inhale 1 puff daily 1 Inhaler 5    atorvastatin (LIPITOR) 20 mg tablet Take 1 tablet (20 mg total) by mouth daily 90 tablet 3    fluticasone (FLONASE) 50 mcg/act nasal spray 2 sprays into each nostril daily 1 Bottle 5    guaiFENesin (MUCINEX) 600 mg 12 hr tablet Take 1 tablet (600 mg total) by mouth 2 (two) times a day 20 tablet 0    omeprazole (PriLOSEC) 20 mg delayed release capsule Take 1 capsule (20 mg total) by mouth daily 90 capsule 5    rosuvastatin (CRESTOR) 20 MG tablet Take 1 tablet (20 mg total) by mouth daily 90 tablet 5    Ventolin  (90 Base) MCG/ACT inhaler Inhale 2 puffs every 4 (four) hours as needed for shortness of breath 1 Inhaler 5    aluminum-magnesium hydroxide-simethicone (MAALOX MAX) 400-400-40 MG/5ML suspension Take 20 mL by mouth every 8 (eight) hours as needed for indigestion or heartburn (Patient not taking: Reported on 11/12/2020) 355 mL 0    benzonatate (TESSALON PERLES) 100 mg capsule Take 1 capsule (100 mg total) by mouth 3 (three) times a day as needed for cough (Patient not taking: Reported on 1/14/2021) 20 capsule 0    betamethasone, augmented, (DIPROLENE) 0 05 % lotion Apply topically daily To scalp (Patient not taking: Reported on 11/12/2020) 60 mL 3    celecoxib (CeleBREX) 200 mg capsule Take 1 capsule (200 mg total) by mouth 2 (two) times a day (Patient not taking: Reported on 11/12/2020)      levalbuterol (XOPENEX) 1 25 mg/0 5 mL nebulizer solution Take 0 5 mL (1 25 mg total) by nebulization every 6 (six) hours as needed for wheezing 30 vial 0    roflumilast (DALIRESP) 250 MCG tablet Take 1 tablet (250 mcg total) by mouth daily 30 tablet 1    triamcinolone (KENALOG) 0 1 % cream Apply topically 3 (three) times a day (Patient not taking: Reported on 11/12/2020) 30 g 5     No current facility-administered medications for this visit  Allergies: Codeine, Cortisone, Penicillin g, and Penicillins    Objective:  Vitals:    01/14/21 1136   BP: 134/84   Pulse: 91   Temp: 97 5 °F (36 4 °C)   SpO2: 91%   Weight: 78 9 kg (174 lb)   Height: 5' 9" (1 753 m)   Oxygen Therapy  SpO2: 91 %    Wt Readings from Last 3 Encounters:   01/14/21 78 9 kg (174 lb)   12/28/20 78 9 kg (174 lb)   11/12/20 78 5 kg (173 lb)     Body mass index is 25 7 kg/m²  Physical Exam  Vitals signs and nursing note reviewed  Constitutional:       Appearance: He is well-developed  HENT:      Head: Normocephalic and atraumatic     Eyes:      Conjunctiva/sclera: Conjunctivae normal  Pupils: Pupils are equal, round, and reactive to light  Neck:      Musculoskeletal: Normal range of motion and neck supple  Thyroid: No thyromegaly  Vascular: No JVD  Cardiovascular:      Rate and Rhythm: Normal rate and regular rhythm  Heart sounds: Normal heart sounds  No murmur  No friction rub  No gallop  Pulmonary:      Effort: No respiratory distress  Breath sounds: No wheezing or rales  Comments: Diminished breath sounds bilaterally no rhonchi  Chest:      Chest wall: No tenderness  Abdominal:      General: Bowel sounds are normal       Palpations: Abdomen is soft  Musculoskeletal: Normal range of motion  General: No tenderness or deformity  Lymphadenopathy:      Cervical: No cervical adenopathy  Skin:     General: Skin is warm and dry  Neurological:      Mental Status: He is alert and oriented to person, place, and time  Diagnostics:  I have personally reviewed pertinent reports

## 2021-01-14 NOTE — TELEPHONE ENCOUNTER
Called the patient, spoke to his wife , hes here for an appt today will talk to him then about the pft results

## 2021-01-28 ENCOUNTER — IMMUNIZATIONS (OUTPATIENT)
Dept: FAMILY MEDICINE CLINIC | Facility: HOSPITAL | Age: 81
End: 2021-01-28

## 2021-01-28 DIAGNOSIS — Z23 ENCOUNTER FOR IMMUNIZATION: Primary | ICD-10-CM

## 2021-01-28 PROCEDURE — 0011A SARS-COV-2 / COVID-19 MRNA VACCINE (MODERNA) 100 MCG: CPT

## 2021-01-28 PROCEDURE — 91301 SARS-COV-2 / COVID-19 MRNA VACCINE (MODERNA) 100 MCG: CPT

## 2021-02-15 ENCOUNTER — CLINICAL SUPPORT (OUTPATIENT)
Dept: PULMONOLOGY | Facility: CLINIC | Age: 81
End: 2021-02-15
Payer: COMMERCIAL

## 2021-02-15 DIAGNOSIS — J44.9 CHRONIC OBSTRUCTIVE PULMONARY DISEASE, UNSPECIFIED COPD TYPE (HCC): Primary | ICD-10-CM

## 2021-02-15 NOTE — PROGRESS NOTES
Pulmonary Rehabilitation Plan of Care   Initial Care Plan      Today's date: 2/15/2021   # of Exercise Sessions Completed: initial evaluation  Patient name: Maria R Jett      : 1940  Age: 68 y o  MRN: [id number]  Referring Physician: Obi Purdy MD  Pulmonologist: himanshu  Provider: Lilliam Angel  Clinician: Adithya Merlos, RRT    Dx:   No diagnosis found  Date of onset: 2021      SUMMARY OF PROGRESS:  Initial evaluation for pulmonary rehab for patient with hx of severe COPD  Patient was hospitalized two times in  for exacerbation  He wears oxygen with exertion and sometimes for HS  He has a POCT and today walked on 3 lpm pulse dose for his 6MWT with a low O2 sat of 92 and high   He c/o severe (8/10 DEEPAK) pain in back and hip with walk test His perception of sob was 4/10 moderate however he was quite tachypneic with walk  Pursed lip breathing was reinforced  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   He would like to breath better with exertion        Medication compliance: Yes   Comments:   Fall Risk: Moderate   Comments: arthritis in hip and knees; pt has uneven gait    Smoking: smoked cigars 10-12 per day for 50 years     RPD @ Rest: 3/10    Assessment of progression of lung disease and functional status:  CAT: 16/40  Shortness of breath questionnaire: 50/120      EXERCISE ASSESSMENT and PLAN    Current Exercise Program in Rehab:       Frequency: days/week        Minutes:          METS:               SpO2:               RPD:                       HR:    RPE: 4-5         Modalities:       Exercise Progression 30 Day Goals :    Frequency: 2-3 days/week        Minutes: 50         METS:               SpO2: >90%               RPD: 4-6                      HR: 125-135   RPE: 4-5        Modalities: Treadmill, Airdyne bike, UBE, NuStep and Recumbent bike     Strength trainin-3 days / week  12-15 repetitions  1-2 sets per modality    Modalities: Chest Press, Lakeshia Deja, Lateral Raise, Arm Extension, Arm Curl, Seated Row and Front Raises    Oxygen Needs: supplemental O2 via nasal cannula @2-3L/min with exercise  Oxygen Goal: Maintain SpO2>90% during exercise    Home Exercise: chores  Education: pursed lipped breathing, home exercise, RPE scale, RPD scale, O2 saturation monitoring and appropriate O2 response to exercise    Goals: Improved 6MW distance by 10%, reduced dyspnea during exercise (0-3/10), SpO2 >90% during exercise and attend pulmonary rehab regularly  Progressing:  Reviewed Pt goals and determined plan of care    Plan: Titrate supplemental oxygen as needed to maintain SpO2>90% with exercise    Readiness to change: Contemplation:  (Acknowledging that there is a problem but not yet ready or sure of wanting to make a change)      NUTRITION ASSESSMENT AND PLAN    Weight control:    Starting weight: 172   Current weight:     Waist circumference:    Starting:    Current:    Diabetes: N/A  Lipid management:   Goals:increase intake of meatless meals  Education:   Progressing:Reviewed Pt goals and determined plan of care  Plan: Education class: Reading Food Labels and Education Class: Heart Healthy Eating  Readiness to change: Pre-Contemplation:   (Not yet acknowledging that there is a problem behavior that needs to change)      PSYCHOSOCIAL ASSESSMENT AND PLAN    Emotional:  Depression assessment:  PHQ-9 = 5-9 = Mild Depression            Anxiety measure:  BETTYE-7 = 0-4  = Not anxious  Self-reported stress level: 10   Social support: Patient reports excellent emotional/social support from family  Goals:  improved [facility] QOL < 27, Physical Fitness in [facility] Score < 3, Daily Activity in [facility] Score < 3, Pain in [facility] Score < 3, Overall Health in [facility] Score < 3, Quality of Life in [facility] Score < 3  and Change in Health in [facility] Score < 3   Education:   Progressing:Reviewed Pt goals and determined plan of care  Plan: patient stopped taking his depression medication and declined [employer] to change: Pre-Contemplation:   (Not yet acknowledging that there is a problem behavior that needs to change)      OTHER CORE COMPONENTS     Tobacco:   Social History     Tobacco Use   Smoking Status Former Smoker    Packs/day: 0 50    Years: 45 00    Pack years: 22 50    Types: Cigars    Start date:     Quit date:     Years since quittin 1   Smokeless Tobacco Never Used       Tobacco Use Intervention: Referral to tobacco expert:   patient quit smoking 5 years ago    Blood pressure:    Resting:    Exercise:     Goals: moderate intensity exercise >150 mins/wk  Education:    Progressing:Reviewed Pt goals and determined plan of care  Plan:   Readiness to change: Contemplation:  (Acknowledging that there is a problem but not yet ready or sure of wanting to make a change)

## 2021-02-15 NOTE — PROGRESS NOTES
PULMONARY REHAB ASSESSMENT    Today's date: February 15, 2021  Patient name: Shane Grossman     : 1940       MRN: [id number]  PCP: Zuleika Turner MD  Referring Physician: Concepcion Bernardo MD  Pulmonologist: same    Dx: very severe COPD      Date of onset: 2021  Cultural needs:     Weight:    Wt Readings from Last 1 Encounters:   21 78 9 kg (174 lb)      Height:   Ht Readings from Last 1 Encounters:   21 5' 9" (1 753 m)     Medical History:   Past Medical History:   Diagnosis Date    Arthritis     COPD (chronic obstructive pulmonary disease) ([address] )     Kidney stones          Physical Limitations: arthritis    Oxygen needs: 2-3 lpm with exercise    Risk Factors   Cholesterol: Yes  Smoking: Former user  HTN: No  DM: No  Obesity: No   Inactivity: Yes  Stress:  perceived  stress: 10/10   Stressors:loss of independence, having to count on others and not being able to help family members due to physical status     Goals for Stress Management:    Family History:  Family History   Problem Relation Age of Onset    Heart failure Mother     Diabetes Mother     Other Mother         MENTAL DISORDER        Allergies: Codeine, Cortisone, Penicillin g, and Penicillins  ETOH:   Social History     Substance and Sexual Activity   Alcohol Use Yes    Frequency: Monthly or less    Drinks per session: 1 or 2    Binge frequency: Never    Comment: OCCASIONAL          Current Medications:   Current Outpatient Medications   Medication Sig Dispense Refill    aluminum-magnesium hydroxide-simethicone (MAALOX MAX) 400-400-40 MG/5ML suspension Take 20 mL by mouth every 8 (eight) hours as needed for indigestion or heartburn (Patient not taking: Reported on 2020) 355 mL 0    Anoro Ellipta 62 5-25 MCG/INH inhaler Inhale 1 puff daily 1 Inhaler 5    atorvastatin (LIPITOR) 20 mg tablet Take 1 tablet (20 mg total) by mouth daily 90 tablet 3    benzonatate (TESSALON PERLES) 100 mg capsule Take 1 capsule (100 mg total) by mouth 3 (three) times a day as needed for cough (Patient not taking: Reported on 1/14/2021) 20 capsule 0    betamethasone, augmented, (DIPROLENE) 0 05 % lotion Apply topically daily To scalp (Patient not taking: Reported on 11/12/2020) 60 mL 3    celecoxib (CeleBREX) 200 mg capsule Take 1 capsule (200 mg total) by mouth 2 (two) times a day (Patient not taking: Reported on 11/12/2020)      fluticasone (FLONASE) 50 mcg/act nasal spray 2 sprays into each nostril daily 1 Bottle 5    guaiFENesin (MUCINEX) 600 mg 12 hr tablet Take 1 tablet (600 mg total) by mouth 2 (two) times a day 20 tablet 0    levalbuterol (XOPENEX) 1 25 mg/0 5 mL nebulizer solution Take 0 5 mL (1 25 mg total) by nebulization every 6 (six) hours as needed for wheezing 30 vial 0    omeprazole (PriLOSEC) 20 mg delayed release capsule Take 1 capsule (20 mg total) by mouth daily 90 capsule 5    roflumilast (DALIRESP) 250 MCG tablet Take 1 tablet (250 mcg total) by mouth daily 30 tablet 1    rosuvastatin (CRESTOR) 20 MG tablet Take 1 tablet (20 mg total) by mouth daily 90 tablet 5    triamcinolone (KENALOG) 0 1 % cream Apply topically 3 (three) times a day (Patient not taking: Reported on 11/12/2020) 30 g 5    Ventolin  (90 Base) MCG/ACT inhaler Inhale 2 puffs every 4 (four) hours as needed for shortness of breath 1 Inhaler 5     No current facility-administered medications for this visit            Current Functional Status  Occupation: retired  Recreation: none  ADLs:Capable of performing light to moderate ADLs  [place]: Capable of performing light to moderate ADLs  Exercise: chores  Other:     Patient Specific Goals:  Breath better    Short Term Program Goals: improved energy/stamina with ADLs    Long Term Goals: Improved functional capacity  Improved Quality of Life - [facility] score reduced    Oxygen Goals: Maintain SpO2>90% titrating supplemental oxygen as needed     Ability to reach goals/rehabilitation potential: Good    Projected return to function: 12 weeks  Objective tests: 6 MWT      Nutritional   Reviewed details of Rate your Plate  Discussed key elements of heart healthy eating  Reviewed patient goals for dietary modifications and their clinical implications  Reviewed most recent lipid profile  Goals for dietary modification: poultry without the skin  more meatless meals  eliminate butter  low sodium      Emotional/Social  Patient has a history of depression  He was taking an antidepressant but not any more  He declined Silver [employer]        SOCIAL SUPPORT NETWORK    Marital status:       Domestic Violence Screening: No    Comments:

## 2021-02-18 ENCOUNTER — TRANSCRIBE ORDERS (OUTPATIENT)
Dept: ADMINISTRATIVE | Facility: HOSPITAL | Age: 81
End: 2021-02-18

## 2021-02-18 ENCOUNTER — HOSPITAL ENCOUNTER (OUTPATIENT)
Dept: RADIOLOGY | Facility: HOSPITAL | Age: 81
Discharge: HOME | End: 2021-02-18
Attending: FAMILY MEDICINE
Payer: COMMERCIAL

## 2021-02-18 ENCOUNTER — APPOINTMENT (OUTPATIENT)
Dept: LAB | Facility: HOSPITAL | Age: 81
End: 2021-02-18
Attending: FAMILY MEDICINE
Payer: COMMERCIAL

## 2021-02-18 DIAGNOSIS — Z12.5 ENCOUNTER FOR SPECIAL SCREENING EXAMINATION FOR NEOPLASM OF PROSTATE: ICD-10-CM

## 2021-02-18 DIAGNOSIS — N20.0 CALCULUS OF KIDNEY: ICD-10-CM

## 2021-02-18 DIAGNOSIS — E87.1 HYPONATREMIA: ICD-10-CM

## 2021-02-18 DIAGNOSIS — Z12.5 PROSTATE CANCER SCREENING: ICD-10-CM

## 2021-02-18 DIAGNOSIS — R73.03 PREDIABETES: ICD-10-CM

## 2021-02-18 DIAGNOSIS — E78.5 HYPERLIPIDEMIA, UNSPECIFIED HYPERLIPIDEMIA TYPE: ICD-10-CM

## 2021-02-18 DIAGNOSIS — N20.0 CALCULUS OF KIDNEY: Primary | ICD-10-CM

## 2021-02-18 DIAGNOSIS — D72.829 LEUKOCYTOSIS, UNSPECIFIED TYPE: ICD-10-CM

## 2021-02-18 LAB
ALBUMIN SERPL BCP-MCNC: 3.6 G/DL (ref 3.5–5)
ALP SERPL-CCNC: 70 U/L (ref 46–116)
ALT SERPL W P-5'-P-CCNC: 28 U/L (ref 12–78)
ANION GAP SERPL CALCULATED.3IONS-SCNC: 8 MMOL/L (ref 4–13)
AST SERPL W P-5'-P-CCNC: 20 U/L (ref 5–45)
BASOPHILS # BLD AUTO: 0.04 THOUSANDS/ΜL (ref 0–0.1)
BASOPHILS NFR BLD AUTO: 1 % (ref 0–1)
BILIRUB SERPL-MCNC: 0.6 MG/DL (ref 0.2–1)
BUN SERPL-MCNC: 14 MG/DL (ref 5–25)
CALCIUM SERPL-MCNC: 9.3 MG/DL (ref 8.3–10.1)
CHLORIDE SERPL-SCNC: 106 MMOL/L (ref 100–108)
CHOLEST SERPL-MCNC: 153 MG/DL (ref 50–200)
CO2 SERPL-SCNC: 30 MMOL/L (ref 21–32)
CREAT SERPL-MCNC: 1.19 MG/DL (ref 0.6–1.3)
EOSINOPHIL # BLD AUTO: 0.37 THOUSAND/ΜL (ref 0–0.61)
EOSINOPHIL NFR BLD AUTO: 5 % (ref 0–6)
ERYTHROCYTE [DISTWIDTH] IN BLOOD BY AUTOMATED COUNT: 13.2 % (ref 11.6–15.1)
GFR SERPL CREATININE-BSD FRML MDRD: 58 ML/MIN/1.73SQ M
GLUCOSE P FAST SERPL-MCNC: 137 MG/DL (ref 65–99)
HCT VFR BLD AUTO: 52.5 % (ref 36.5–49.3)
HDLC SERPL-MCNC: 40 MG/DL
HGB BLD-MCNC: 17.1 G/DL (ref 12–17)
IMM GRANULOCYTES # BLD AUTO: 0.02 THOUSAND/UL (ref 0–0.2)
IMM GRANULOCYTES NFR BLD AUTO: 0 % (ref 0–2)
LDLC SERPL CALC-MCNC: 71 MG/DL (ref 0–100)
LYMPHOCYTES # BLD AUTO: 1.59 THOUSANDS/ΜL (ref 0.6–4.47)
LYMPHOCYTES NFR BLD AUTO: 20 % (ref 14–44)
MCH RBC QN AUTO: 29.8 PG (ref 26.8–34.3)
MCHC RBC AUTO-ENTMCNC: 32.6 G/DL (ref 31.4–37.4)
MCV RBC AUTO: 92 FL (ref 82–98)
MONOCYTES # BLD AUTO: 0.77 THOUSAND/ΜL (ref 0.17–1.22)
MONOCYTES NFR BLD AUTO: 10 % (ref 4–12)
NEUTROPHILS # BLD AUTO: 5.12 THOUSANDS/ΜL (ref 1.85–7.62)
NEUTS SEG NFR BLD AUTO: 64 % (ref 43–75)
NONHDLC SERPL-MCNC: 113 MG/DL
NRBC BLD AUTO-RTO: 0 /100 WBCS
PLATELET # BLD AUTO: 174 THOUSANDS/UL (ref 149–390)
PMV BLD AUTO: 11.8 FL (ref 8.9–12.7)
POTASSIUM SERPL-SCNC: 4.3 MMOL/L (ref 3.5–5.3)
PROT SERPL-MCNC: 7.1 G/DL (ref 6.4–8.2)
RBC # BLD AUTO: 5.73 MILLION/UL (ref 3.88–5.62)
SODIUM SERPL-SCNC: 144 MMOL/L (ref 136–145)
TRIGL SERPL-MCNC: 211 MG/DL
WBC # BLD AUTO: 7.91 THOUSAND/UL (ref 4.31–10.16)

## 2021-02-18 PROCEDURE — 80053 COMPREHEN METABOLIC PANEL: CPT

## 2021-02-18 PROCEDURE — 80061 LIPID PANEL: CPT

## 2021-02-18 PROCEDURE — G0103 PSA SCREENING: HCPCS

## 2021-02-18 PROCEDURE — 36415 COLL VENOUS BLD VENIPUNCTURE: CPT

## 2021-02-18 PROCEDURE — 85025 COMPLETE CBC W/AUTO DIFF WBC: CPT

## 2021-02-18 PROCEDURE — 83036 HEMOGLOBIN GLYCOSYLATED A1C: CPT

## 2021-02-18 PROCEDURE — 74018 RADEX ABDOMEN 1 VIEW: CPT

## 2021-02-19 LAB
EST. AVERAGE GLUCOSE BLD GHB EST-MCNC: 123 MG/DL
HBA1C MFR BLD: 5.9 %
PSA SERPL-MCNC: 1.2 NG/ML (ref 0–4)

## 2021-02-22 ENCOUNTER — CLINICAL SUPPORT (OUTPATIENT)
Dept: PULMONOLOGY | Facility: CLINIC | Age: 81
End: 2021-02-22
Payer: COMMERCIAL

## 2021-02-22 ENCOUNTER — OFFICE VISIT (OUTPATIENT)
Dept: PULMONOLOGY | Facility: CLINIC | Age: 81
End: 2021-02-22

## 2021-02-22 DIAGNOSIS — J43.2 CENTRILOBULAR EMPHYSEMA (HCC): Primary | ICD-10-CM

## 2021-02-22 DIAGNOSIS — J44.9 CHRONIC OBSTRUCTIVE PULMONARY DISEASE, UNSPECIFIED COPD TYPE (HCC): ICD-10-CM

## 2021-02-22 PROCEDURE — RECHECK: Performed by: INTERNAL MEDICINE

## 2021-02-22 PROCEDURE — G0424 PULMONARY REHAB W EXER: HCPCS

## 2021-02-22 NOTE — PROGRESS NOTES
Medical Director 30 day Pulmonary Rehabilitation Review    I certify that I have met with Yumiko ivey to provide a 30 day progress review of his/her pulmonary rehabilitation program at [address] [place] [address]    I have reviewed the most recent individualized treatment plan (ITP), outcomes assessment, and provided opportunity for discussion with the patient    Comments: None    Continue with current treatment plan yes    Please provide the following modifications to the current treatment plan: Lissy Mccrary MD

## 2021-02-24 ENCOUNTER — APPOINTMENT (OUTPATIENT)
Dept: PULMONOLOGY | Facility: CLINIC | Age: 81
End: 2021-02-24
Payer: COMMERCIAL

## 2021-02-24 ENCOUNTER — IMMUNIZATIONS (OUTPATIENT)
Dept: FAMILY MEDICINE CLINIC | Facility: HOSPITAL | Age: 81
End: 2021-02-24

## 2021-02-24 ENCOUNTER — TELEPHONE (OUTPATIENT)
Dept: GASTROENTEROLOGY | Facility: CLINIC | Age: 81
End: 2021-02-24

## 2021-02-24 DIAGNOSIS — Z23 ENCOUNTER FOR IMMUNIZATION: Primary | ICD-10-CM

## 2021-02-24 PROCEDURE — 91301 SARS-COV-2 / COVID-19 MRNA VACCINE (MODERNA) 100 MCG: CPT

## 2021-02-24 PROCEDURE — 0012A SARS-COV-2 / COVID-19 MRNA VACCINE (MODERNA) 100 MCG: CPT

## 2021-02-24 NOTE — TELEPHONE ENCOUNTER
Patient is due for colon recall he has COPD, do you want him to have an ov or are we no longer doing colonoscopies based on age?

## 2021-03-01 ENCOUNTER — APPOINTMENT (OUTPATIENT)
Dept: PULMONOLOGY | Facility: CLINIC | Age: 81
End: 2021-03-01
Payer: COMMERCIAL

## 2021-03-01 ENCOUNTER — OFFICE VISIT (OUTPATIENT)
Dept: FAMILY MEDICINE CLINIC | Facility: CLINIC | Age: 81
End: 2021-03-01
Payer: COMMERCIAL

## 2021-03-01 VITALS
DIASTOLIC BLOOD PRESSURE: 74 MMHG | WEIGHT: 174 LBS | BODY MASS INDEX: 25.77 KG/M2 | TEMPERATURE: 97.6 F | OXYGEN SATURATION: 88 % | SYSTOLIC BLOOD PRESSURE: 128 MMHG | HEART RATE: 85 BPM | HEIGHT: 69 IN

## 2021-03-01 DIAGNOSIS — Z00.00 MEDICARE ANNUAL WELLNESS VISIT, SUBSEQUENT: Primary | ICD-10-CM

## 2021-03-01 DIAGNOSIS — M51.9 LUMBAR DISC DISEASE: ICD-10-CM

## 2021-03-01 DIAGNOSIS — K21.9 GASTROESOPHAGEAL REFLUX DISEASE, UNSPECIFIED WHETHER ESOPHAGITIS PRESENT: ICD-10-CM

## 2021-03-01 DIAGNOSIS — E78.5 HYPERLIPIDEMIA, UNSPECIFIED HYPERLIPIDEMIA TYPE: ICD-10-CM

## 2021-03-01 DIAGNOSIS — R73.03 PREDIABETES: ICD-10-CM

## 2021-03-01 DIAGNOSIS — J43.2 CENTRILOBULAR EMPHYSEMA (HCC): ICD-10-CM

## 2021-03-01 DIAGNOSIS — Z12.11 COLON CANCER SCREENING: ICD-10-CM

## 2021-03-01 DIAGNOSIS — I71.4 AAA (ABDOMINAL AORTIC ANEURYSM) WITHOUT RUPTURE (HCC): ICD-10-CM

## 2021-03-01 PROBLEM — J44.1 COPD EXACERBATION (HCC): Status: RESOLVED | Noted: 2018-02-13 | Resolved: 2021-03-01

## 2021-03-01 PROBLEM — J96.01 ACUTE RESPIRATORY FAILURE WITH HYPOXIA (HCC): Status: RESOLVED | Noted: 2020-11-06 | Resolved: 2021-03-01

## 2021-03-01 PROBLEM — R03.0 ELEVATED BLOOD PRESSURE, SITUATIONAL: Status: RESOLVED | Noted: 2019-11-15 | Resolved: 2021-03-01

## 2021-03-01 PROCEDURE — 1170F FXNL STATUS ASSESSED: CPT | Performed by: FAMILY MEDICINE

## 2021-03-01 PROCEDURE — G0439 PPPS, SUBSEQ VISIT: HCPCS | Performed by: FAMILY MEDICINE

## 2021-03-01 PROCEDURE — 3288F FALL RISK ASSESSMENT DOCD: CPT | Performed by: FAMILY MEDICINE

## 2021-03-01 PROCEDURE — 99214 OFFICE O/P EST MOD 30 MIN: CPT | Performed by: FAMILY MEDICINE

## 2021-03-01 PROCEDURE — 1125F AMNT PAIN NOTED PAIN PRSNT: CPT | Performed by: FAMILY MEDICINE

## 2021-03-01 PROCEDURE — 3725F SCREEN DEPRESSION PERFORMED: CPT | Performed by: FAMILY MEDICINE

## 2021-03-01 PROCEDURE — 1101F PT FALLS ASSESS-DOCD LE1/YR: CPT | Performed by: FAMILY MEDICINE

## 2021-03-01 RX ORDER — ROSUVASTATIN CALCIUM 20 MG/1
20 TABLET, COATED ORAL DAILY
Qty: 90 TABLET | Refills: 5 | Status: SHIPPED | OUTPATIENT
Start: 2021-03-01 | End: 2022-05-30

## 2021-03-01 RX ORDER — OMEPRAZOLE 20 MG/1
20 CAPSULE, DELAYED RELEASE ORAL DAILY
Qty: 90 CAPSULE | Refills: 5 | Status: SHIPPED | OUTPATIENT
Start: 2021-03-01 | End: 2022-05-15

## 2021-03-01 NOTE — PATIENT INSTRUCTIONS

## 2021-03-01 NOTE — PROGRESS NOTES
Assessment and Plan:     Problem List Items Addressed This Visit     None      Visit Diagnoses     Medicare annual wellness visit, subsequent    -  Primary           Preventive health issues were discussed with patient, and age appropriate screening tests were ordered as noted in patient's After Visit Summary  Personalized health advice and appropriate referrals for health education or preventive services given if needed, as noted in patient's After Visit Summary       History of Present Illness:     Patient presents for Medicare Annual Wellness visit    Patient Care Team:  Zuleika Turner MD as PCP - General     Problem List:     Patient Active Problem List   Diagnosis    COPD exacerbation ([address] )    Hyperlipidemia    Lumbar disc disease    Seborrhea    Actinic keratosis    Psoriasis    Screening for skin condition    Former smoker    Kidney stones    AAA (abdominal aortic aneurysm) without rupture ([address] )    Elevated blood pressure, situational    Prediabetes    Acute respiratory failure with hypoxia ([address] )    Centrilobular emphysema ([address] )      Past Medical and Surgical History:     Past Medical History:   Diagnosis Date    Arthritis     COPD (chronic obstructive pulmonary disease) ([address] )     Kidney stones      Past Surgical History:   Procedure Laterality Date    ABDOMINAL AORTIC ANEURYSM REPAIR      ENDOVASC REPAIR AAA PLACE VISCERAL EXTENS PROSTH    LAST ASSESSED [id number]    COLONOSCOPY  03/09/2011    HAND SURGERY      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       Family History:     Family History   Problem Relation Age of Onset    Heart failure Mother     Diabetes Mother     Other Mother         MENTAL DISORDER       Social History:     E-Cigarette/Vaping    E-Cigarette Use Never User      E-Cigarette/Vaping Substances    Nicotine No     THC No     CBD No     Flavoring No     Other No     Unknown No      Social History     Socioeconomic History    Marital status: /Civil Union     Spouse name: None    Number of children: None    Years of education: None    Highest education level: None   Occupational History    Occupation: RETIRED    Social Needs    Financial resource strain: None    Food insecurity     Worry: None     Inability: None    Transportation needs     Medical: None     Non-medical: None   Tobacco Use    Smoking status: Former Smoker     Packs/day: 0 50     Years: 45 00     Pack years: 22 50     Types: Cigars     Start date:      Quit date:      Years since quittin 1    Smokeless tobacco: Never Used   Substance and Sexual Activity    Alcohol use: Yes     Frequency: Monthly or less     Drinks per session: 1 or 2     Binge frequency: Never     Comment: OCCASIONAL     Drug use: No    Sexual activity: None   Lifestyle    Physical activity     Days per week: None     Minutes per session: None    Stress: None   Relationships    Social connections     Talks on phone: None     Gets together: None     Attends Rastafari service: None     Active member of club or organization: None     Attends meetings of clubs or organizations: None     Relationship status: None    Intimate partner violence     Fear of current or ex partner: None     Emotionally abused: None     Physically abused: None     Forced sexual activity: None   Other Topics Concern    None   Social History Narrative    ALWAYS USES [employer]    LACK OF PHYSICAL EXERCISE    LIVES WITH SPOUSE       Medications and Allergies:     Current Outpatient Medications   Medication Sig Dispense Refill    Anoro Ellipta 62 5-25 MCG/INH inhaler Inhale 1 puff daily 1 Inhaler 5    atorvastatin (LIPITOR) 20 mg tablet Take 1 tablet (20 mg total) by mouth daily 90 tablet 3    fluticasone (FLONASE) 50 mcg/act nasal spray 2 sprays into each nostril daily 1 Bottle 5    guaiFENesin (MUCINEX) 600 mg 12 hr tablet Take 1 tablet (600 mg total) by mouth 2 (two) times a day 20 tablet 0    levalbuterol (XOPENEX) 1 25 mg/0 5 mL nebulizer solution Take 0 5 mL (1 25 mg total) by nebulization every 6 (six) hours as needed for wheezing 30 vial 0    omeprazole (PriLOSEC) 20 mg delayed release capsule Take 1 capsule (20 mg total) by mouth daily 90 capsule 5    roflumilast (DALIRESP) 250 MCG tablet Take 1 tablet (250 mcg total) by mouth daily 30 tablet 1    rosuvastatin (CRESTOR) 20 MG tablet Take 1 tablet (20 mg total) by mouth daily 90 tablet 5    Ventolin  (90 Base) MCG/ACT inhaler Inhale 2 puffs every 4 (four) hours as needed for shortness of breath 1 Inhaler 5    aluminum-magnesium hydroxide-simethicone (MAALOX MAX) 400-400-40 MG/5ML suspension Take 20 mL by mouth every 8 (eight) hours as needed for indigestion or heartburn (Patient not taking: Reported on 11/12/2020) 355 mL 0    benzonatate (TESSALON PERLES) 100 mg capsule Take 1 capsule (100 mg total) by mouth 3 (three) times a day as needed for cough (Patient not taking: Reported on 1/14/2021) 20 capsule 0    betamethasone, augmented, (DIPROLENE) 0 05 % lotion Apply topically daily To scalp (Patient not taking: Reported on 11/12/2020) 60 mL 3    celecoxib (CeleBREX) 200 mg capsule Take 1 capsule (200 mg total) by mouth 2 (two) times a day (Patient not taking: Reported on 11/12/2020)      triamcinolone (KENALOG) 0 1 % cream Apply topically 3 (three) times a day (Patient not taking: Reported on 11/12/2020) 30 g 5     No current facility-administered medications for this visit        Allergies   Allergen Reactions    Codeine Nausea Only    Cortisone      Other reaction(s): Unknown    Penicillin G      Other reaction(s): Unknown    Penicillins       Immunizations:     Immunization History   Administered Date(s) Administered    INFLUENZA 10/04/2018, 11/30/2020    Influenza Split High Dose Preservative Free IM 10/04/2018, 09/19/2019    Pneumococcal Conjugate 13-Valent 02/10/2017, 09/19/2019    SARS-CoV-2 / COVID-19 mRNA IM (Moderna) 01/28/2021, 02/24/2021    Td (adult), Not Adsorbed 11/27/2016      Health Maintenance:         Topic Date Due    Lung Cancer Screening  11/07/1997         Topic Date Due    DTaP,Tdap,and Td Vaccines (1 - Tdap) 11/07/1963    Pneumococcal Vaccine: 65+ Years (2 of 2 - PPSV23) 09/19/2020      Medicare Health Risk Assessment:     /74 (BP Location: Left arm, Patient Position: Sitting, Cuff Size: Large)   Pulse 85   Temp 97 6 °F (36 4 °C) (Tympanic)   Ht 5' 9" (1 753 m)   Wt 78 9 kg (174 lb)   SpO2 (!) 88%   BMI 25 70 kg/m²      Jillian Bazan is here for his Subsequent Wellness visit  Last Medicare Wellness visit information reviewed, patient interviewed and updates made to the record today  Health Risk Assessment:   Patient rates overall health as fair  Patient feels that their physical health rating is slightly worse  Eyesight was rated as same  Hearing was rated as same  Patient feels that their emotional and mental health rating is same  Pain experienced in the last 7 days has been a lot  Patient's pain rating has been 9/10  Patient states that he has experienced no weight loss or gain in last 6 months  Depression Screening:   PHQ-2 Score: 0      Fall Risk Screening: In the past year, patient has experienced: no history of falling in past year      Home Safety:  Patient does not have trouble with stairs inside or outside of their home  Patient has working smoke alarms and has working carbon monoxide detector  Home safety hazards include: none  Nutrition:   Current diet is Regular  Medications:   Patient is not currently taking any over-the-counter supplements  Patient is able to manage medications  Activities of Daily Living (ADLs)/Instrumental Activities of Daily Living (IADLs):   Walk and transfer into and out of bed and chair?: Yes  Dress and groom yourself?: Yes    Bathe or shower yourself?: Yes    Feed yourself?  Yes  Do your laundry/housekeeping?: Yes  Manage your money, pay your bills and track your expenses?: Yes  Make your own meals?: Yes    Do your own shopping?: Yes    Previous Hospitalizations:   Any hospitalizations or ED visits within the last 12 months?: Yes    How many hospitalizations have you had in the last year?: 1-2    Advance Care Planning:   Living will: No    Durable POA for healthcare: No    Advanced directive: Yes    Advanced directive counseling given: Yes    Five wishes given: Yes    End of Life Decisions reviewed with patient: Yes    Provider agrees with end of life decisions: Yes      PREVENTIVE SCREENINGS      Cardiovascular Screening:    General: Screening Not Indicated and History Lipid Disorder      Diabetes Screening:     General: Screening Current      Colorectal Cancer Screening:     General: Risks and Benefits Discussed    Due for: Colonoscopy - Low Risk      Prostate Cancer Screening:    General: Screening Current      Osteoporosis Screening:    General: Screening Not Indicated      Abdominal Aortic Aneurysm (AAA) Screening:    Risk factors include: tobacco use        General: Screening Not Indicated and History AAA      Lung Cancer Screening:     General: Screening Not Indicated      Hepatitis C Screening:    General: Screening Current    Other Counseling Topics:   Car/seat belt/driving safety and sunscreen         Leigh Mendez MD

## 2021-03-01 NOTE — PROGRESS NOTES
BMI Counseling: Body mass index is 25 7 kg/m²  Follow-up plan was not completed due to elderly patient (74 years old) where weight reduction/weight gain would complicate underlying health condition such as: illness or physical disability  Assessment/Plan:    Return visit in 1 year for annual checkup     Problem List Items Addressed This Visit        Digestive    Gastroesophageal reflux disease    Relevant Medications    omeprazole (PriLOSEC) 20 mg delayed release capsule       Respiratory    Centrilobular emphysema (HCC)      Continue Anoro Ellipta and Ventolin inhaler as needed also taking Daliresp            Cardiovascular and Mediastinum    AAA (abdominal aortic aneurysm) without rupture (HCC)      Follow-up with vascular surgery            Musculoskeletal and Integument    Lumbar disc disease       Other    Hyperlipidemia    Relevant Medications    rosuvastatin (CRESTOR) 20 MG tablet    Prediabetes      Low carb diet           Other Visit Diagnoses     Medicare annual wellness visit, subsequent    -  Primary    Colon cancer screening        Relevant Orders    Cologuard            Subjective:      Patient ID: Destiney Beavers is a 68 y o  male  HPI    The following portions of the patient's history were reviewed and updated as appropriate:   Past Medical History:  He has a past medical history of Arthritis, COPD (chronic obstructive pulmonary disease) ([address] ), and Kidney stones  ,  _______________________________________________________________________  Medical Problems:  does not have any pertinent problems on file ,  _______________________________________________________________________  Past Surgical History:   has a past surgical history that includes Colonoscopy (03/09/2011); Abdominal aortic aneurysm repair; and Hand surgery  ,  _______________________________________________________________________  Family History:  family history includes Diabetes in his mother; Heart failure in his mother; Other in his mother ,  _______________________________________________________________________  Social History:   reports that he quit smoking about 7 years ago  His smoking use included cigars  He started smoking about 52 years ago  He has a 22 50 pack-year smoking history  He has never used smokeless tobacco  He reports current alcohol use  He reports that he does not use drugs  ,  _______________________________________________________________________  Allergies:  is allergic to codeine; cortisone; penicillin g; and penicillins     _______________________________________________________________________  Current Outpatient Medications   Medication Sig Dispense Refill    Anoro Ellipta 62 5-25 MCG/INH inhaler Inhale 1 puff daily 1 Inhaler 5    fluticasone (FLONASE) 50 mcg/act nasal spray 2 sprays into each nostril daily 1 Bottle 5    guaiFENesin (MUCINEX) 600 mg 12 hr tablet Take 1 tablet (600 mg total) by mouth 2 (two) times a day 20 tablet 0    levalbuterol (XOPENEX) 1 25 mg/0 5 mL nebulizer solution Take 0 5 mL (1 25 mg total) by nebulization every 6 (six) hours as needed for wheezing 30 vial 0    omeprazole (PriLOSEC) 20 mg delayed release capsule Take 1 capsule (20 mg total) by mouth daily 90 capsule 5    roflumilast (DALIRESP) 250 MCG tablet Take 1 tablet (250 mcg total) by mouth daily 30 tablet 1    rosuvastatin (CRESTOR) 20 MG tablet Take 1 tablet (20 mg total) by mouth daily 90 tablet 5    Ventolin  (90 Base) MCG/ACT inhaler Inhale 2 puffs every 4 (four) hours as needed for shortness of breath 1 Inhaler 5    betamethasone, augmented, (DIPROLENE) 0 05 % lotion Apply topically daily To scalp (Patient not taking: Reported on 11/12/2020) 60 mL 3    celecoxib (CeleBREX) 200 mg capsule Take 1 capsule (200 mg total) by mouth 2 (two) times a day (Patient not taking: Reported on 11/12/2020)      triamcinolone (KENALOG) 0 1 % cream Apply topically 3 (three) times a day (Patient not taking: Reported on 11/12/2020) 30 g 5     No current facility-administered medications for this visit       _______________________________________________________________________  Review of Systems      Objective:  Vitals:    03/01/21 0827   BP: 128/74   BP Location: Left arm   Patient Position: Sitting   Cuff Size: Large   Pulse: 85   Temp: 97 6 °F (36 4 °C)   TempSrc: Tympanic   SpO2: (!) 88%   Weight: 78 9 kg (174 lb)   Height: 5' 9" (1 753 m)     Body mass index is 25 7 kg/m²  Physical Exam  Constitutional:       Appearance: He is well-developed  HENT:      Head: Normocephalic and atraumatic  Eyes:      Pupils: Pupils are equal, round, and reactive to light  Neck:      Musculoskeletal: Neck supple  Cardiovascular:      Rate and Rhythm: Normal rate and regular rhythm  Heart sounds: Normal heart sounds  Pulmonary:      Effort: Pulmonary effort is normal       Breath sounds: Normal breath sounds  Abdominal:      General: Bowel sounds are normal       Palpations: Abdomen is soft  Musculoskeletal: Normal range of motion  Skin:     General: Skin is warm and dry  Neurological:      Mental Status: He is alert and oriented to person, place, and time  Psychiatric:         Thought Content:  Thought content normal

## 2021-03-02 DIAGNOSIS — E78.5 HYPERLIPIDEMIA, UNSPECIFIED HYPERLIPIDEMIA TYPE: ICD-10-CM

## 2021-03-02 RX ORDER — ROSUVASTATIN CALCIUM 20 MG/1
TABLET, COATED ORAL
Qty: 90 TABLET | Refills: 5 | OUTPATIENT
Start: 2021-03-02

## 2021-03-03 ENCOUNTER — CLINICAL SUPPORT (OUTPATIENT)
Dept: PULMONOLOGY | Facility: CLINIC | Age: 81
End: 2021-03-03
Payer: COMMERCIAL

## 2021-03-03 DIAGNOSIS — J44.9 CHRONIC OBSTRUCTIVE PULMONARY DISEASE, UNSPECIFIED COPD TYPE (HCC): ICD-10-CM

## 2021-03-03 PROCEDURE — G0424 PULMONARY REHAB W EXER: HCPCS

## 2021-03-05 NOTE — PROGRESS NOTES
[facility]      Dear Dr Max Bowman,    Thank you for referring your patient to our cardiac rehabilitation program  Joy Rick  has completed 3  visits  However, rehab is being discontinued at this time due to:    Voluntary Dropout:  The patient has chosen to quit due to :  Extreme back pain with exercise  Please contact us at [phone] if you have any questions about this patents case or if/when it is appropriate to re-start the patients rehab program at a later date  Thank you for your continued support of cardiac rehabilitation         Sincerely,      Angel Fowler, RRT

## 2021-03-08 ENCOUNTER — APPOINTMENT (OUTPATIENT)
Dept: PULMONOLOGY | Facility: CLINIC | Age: 81
End: 2021-03-08
Payer: COMMERCIAL

## 2021-03-10 ENCOUNTER — APPOINTMENT (OUTPATIENT)
Dept: PULMONOLOGY | Facility: CLINIC | Age: 81
End: 2021-03-10
Payer: COMMERCIAL

## 2021-03-15 ENCOUNTER — APPOINTMENT (OUTPATIENT)
Dept: PULMONOLOGY | Facility: CLINIC | Age: 81
End: 2021-03-15
Payer: COMMERCIAL

## 2021-03-15 ENCOUNTER — OFFICE VISIT (OUTPATIENT)
Dept: PULMONOLOGY | Facility: CLINIC | Age: 81
End: 2021-03-15
Payer: COMMERCIAL

## 2021-03-15 VITALS
OXYGEN SATURATION: 98 % | HEART RATE: 104 BPM | BODY MASS INDEX: 25.77 KG/M2 | HEIGHT: 69 IN | DIASTOLIC BLOOD PRESSURE: 78 MMHG | SYSTOLIC BLOOD PRESSURE: 124 MMHG | TEMPERATURE: 97.7 F | WEIGHT: 174 LBS

## 2021-03-15 DIAGNOSIS — J96.11 CHRONIC HYPOXEMIC RESPIRATORY FAILURE (HCC): ICD-10-CM

## 2021-03-15 DIAGNOSIS — J43.2 CENTRILOBULAR EMPHYSEMA (HCC): ICD-10-CM

## 2021-03-15 DIAGNOSIS — R06.02 SOB (SHORTNESS OF BREATH): ICD-10-CM

## 2021-03-15 DIAGNOSIS — J44.9 STAGE 4 VERY SEVERE COPD BY GOLD CLASSIFICATION (HCC): ICD-10-CM

## 2021-03-15 DIAGNOSIS — J41.0 SIMPLE CHRONIC BRONCHITIS (HCC): Primary | ICD-10-CM

## 2021-03-15 DIAGNOSIS — J44.9 CHRONIC OBSTRUCTIVE PULMONARY DISEASE, UNSPECIFIED COPD TYPE (HCC): ICD-10-CM

## 2021-03-15 PROCEDURE — 99214 OFFICE O/P EST MOD 30 MIN: CPT | Performed by: INTERNAL MEDICINE

## 2021-03-15 PROCEDURE — 1036F TOBACCO NON-USER: CPT | Performed by: INTERNAL MEDICINE

## 2021-03-15 PROCEDURE — 1160F RVW MEDS BY RX/DR IN RCRD: CPT | Performed by: INTERNAL MEDICINE

## 2021-03-15 NOTE — PROGRESS NOTES
Assessment/Plan:   Diagnoses and all orders for this visit:    Simple chronic bronchitis (HCC)    Centrilobular emphysema (HCC)    SOB (shortness of breath)    Chronic hypoxemic respiratory failure (HCC)    Stage 4 very severe COPD by GOLD classification ([address] )  -     roflumilast (DALIRESP) 250 MCG tablet; Take 1 tablet (250 mcg total) by mouth daily    Chronic obstructive pulmonary disease, unspecified COPD type (HCC)  -     Ventolin  (90 Base) MCG/ACT inhaler; Inhale 2 puffs every 4 (four) hours as needed for shortness of breath        Chronic simple bronchitis with extensive smoking history who quit about 7 years ago  Had a greater than 45 pack-year smoking history  PFTs with severe obstructive ventilatory limitation with an FEV1 of 31% and his lung volumes have been decreased 56% predicted total lung capacity along with DLCO at severely decreased at 30%  6 minutes walk test, could walk a total distance of 213 m in 6 minutes needing supplemental oxygen 2 L of oxygen by nasal cannula      CT of the chest recently done in August of 2020 again reviewed with centrilobular as well as panlobular emphysema with no evidence of any lung nodules  Not a candidate for CT lung screening will repeat CT of the chest as needed p r n  For any indication as needed  He does not tolerate inhaled corticosteroids he states with the history of thrush and sore throat  Discussed with the patient to continue with Anoro 1 puff daily  Discussed the the long-acting and short-acting bronchodilators is in COPD understands and verbalizes  Continue with Ventolin MDI 2 puffs 4 times daily as needed  He also states he does not tolerate albuterol via nebulizer which makes him feel worse and he has not been using it  Also shortness of breath and dyspnea on exertion likely multifactorial  to his COPD severe,  also likely pulmonary hypertension given his extensive COPD    I have asked him to continue with the 2 L of oxygen on exertion as well as nocturnal for now and will follow-up with a 6 minutes walk test   Given multiple exacerbations this year I have discussed with him regarding Daliresp side effects of the medication discussed with the patient Daliresp 250 mg 1 tablet daily  he has been taking this medication for a month now, tolerating it well  He did go for pulmonary rehab a few sessions and he states he has significant back pain that is limiting his exercise there and he does not want to go and he stop the sessions  Not a candidate for lung transplant, I have discussed with him regarding other treatment options with lung volume reduction by valve , wants to think about it at and will readdress again next visit and place in a referral if he agrees  Will follow-up with his echocardiogram that was done in [facility] will try to get his records  Will follow-up in 3 months or p r n  earlier as needed  Return in about 3 months (around 6/15/2021)  All questions are answered to the patient's satisfaction and understanding  He verbalizes understanding  He is encouraged to call with any further questions or concerns  Portions of the record may have been created with voice recognition software  Occasional wrong word or "sound a like" substitutions may have occurred due to the inherent limitations of voice recognition software  Read the chart carefully and recognize, using context, where substitutions have occurred  Electronically Signed by Jigna Tabor MD    ______________________________________________________________________    Chief Complaint:   Chief Complaint   Patient presents with    Follow-up    Shortness of Breath       Patient ID: Al Jordan is a 68 y o  y o  male has a past medical history of Arthritis, COPD (chronic obstructive pulmonary disease) ([facility] [address] ), and Kidney stones  3/15/2021  Patient presents today for follow-up visit    Patient is a very pleasant 73-year-old gentleman, former smoker with greater than 45 pack-year smoking history who quit about 7 years ago, with history of COPD following up at [facility] pulmonology,  He has had 3-4 exacerbations of his COPD this year, and 2 hospital admissions 1 in August and another 1 again in November of 2020,   He does have 2 L of oxygen for use on exertion as well as nocturnal   He does states shortness of breath and dyspnea on exertion which has been gradually worsening   he is here for follow-up           Review of Systems   Constitutional: Positive for fatigue  Negative for activity change, appetite change, chills, diaphoresis, fever and unexpected weight change  HENT: Negative for congestion, dental problem, drooling, nosebleeds, postnasal drip, rhinorrhea, sinus pressure, sore throat and voice change  Eyes: Negative for discharge, itching and visual disturbance  Respiratory: Positive for cough (clear sputum, no hemoptysis), shortness of breath and wheezing  All his symptoms are better since last visit  Although he states the good days and bad days, stated today is a good day  Not much wheezing when he got up in the morning  Cardiovascular: Negative for chest pain, palpitations and leg swelling  Endocrine: Negative for cold intolerance and heat intolerance  Allergic/Immunologic: Negative for food allergies and immunocompromised state  Neurological: Negative for dizziness, facial asymmetry, speech difficulty and weakness  Hematological: Negative for adenopathy  Psychiatric/Behavioral: Negative for agitation, confusion and sleep disturbance  The patient is not nervous/anxious  Smoking history: He reports that he quit smoking about 7 years ago  His smoking use included cigars  He started smoking about 52 years ago  He has a 22 50 pack-year smoking history   He has never used smokeless tobacco     The following portions of the patient's history were reviewed and updated as appropriate: allergies, current medications, past family history, past medical history, past social history, past surgical history and problem list     Immunization History   Administered Date(s) Administered    INFLUENZA 10/04/2018, 11/30/2020    Influenza Split High Dose Preservative Free IM 10/04/2018, 09/19/2019    Pneumococcal Conjugate 13-Valent 02/10/2017, 09/19/2019    SARS-CoV-2 / COVID-19 mRNA IM (Moderna) 01/28/2021, 02/24/2021    Td (adult), Not Adsorbed 11/27/2016     Current Outpatient Medications   Medication Sig Dispense Refill    Anoro Ellipta 62 5-25 MCG/INH inhaler Inhale 1 puff daily 1 Inhaler 5    fluticasone (FLONASE) 50 mcg/act nasal spray 2 sprays into each nostril daily 1 Bottle 5    levalbuterol (XOPENEX) 1 25 mg/0 5 mL nebulizer solution Take 0 5 mL (1 25 mg total) by nebulization every 6 (six) hours as needed for wheezing 30 vial 0    omeprazole (PriLOSEC) 20 mg delayed release capsule Take 1 capsule (20 mg total) by mouth daily 90 capsule 5    roflumilast (DALIRESP) 250 MCG tablet Take 1 tablet (250 mcg total) by mouth daily 30 tablet 5    rosuvastatin (CRESTOR) 20 MG tablet Take 1 tablet (20 mg total) by mouth daily 90 tablet 5    Ventolin  (90 Base) MCG/ACT inhaler Inhale 2 puffs every 4 (four) hours as needed for shortness of breath 1 Inhaler 5    betamethasone, augmented, (DIPROLENE) 0 05 % lotion Apply topically daily To scalp (Patient not taking: Reported on 11/12/2020) 60 mL 3    celecoxib (CeleBREX) 200 mg capsule Take 1 capsule (200 mg total) by mouth 2 (two) times a day (Patient not taking: Reported on 11/12/2020)      guaiFENesin (MUCINEX) 600 mg 12 hr tablet Take 1 tablet (600 mg total) by mouth 2 (two) times a day (Patient not taking: Reported on 3/15/2021) 20 tablet 0    triamcinolone (KENALOG) 0 1 % cream Apply topically 3 (three) times a day (Patient not taking: Reported on 11/12/2020) 30 g 5     No current facility-administered medications for this visit        Allergies: Codeine, Cortisone, Penicillin g, and Penicillins    Objective:  Vitals:    03/15/21 0836   BP: 124/78   Pulse: 104   Temp: 97 7 °F (36 5 °C)   SpO2: 98%   Weight: 78 9 kg (174 lb)   Height: 5' 9" (1 753 m)   Oxygen Therapy  SpO2: 98 %    Wt Readings from Last 3 Encounters:   03/15/21 78 9 kg (174 lb)   03/01/21 78 9 kg (174 lb)   01/14/21 78 9 kg (174 lb)     Body mass index is 25 7 kg/m²  Physical Exam  Vitals signs and nursing note reviewed  Constitutional:       Appearance: He is well-developed  HENT:      Head: Normocephalic and atraumatic  Eyes:      Conjunctiva/sclera: Conjunctivae normal       Pupils: Pupils are equal, round, and reactive to light  Neck:      Musculoskeletal: Normal range of motion and neck supple  Thyroid: No thyromegaly  Vascular: No JVD  Cardiovascular:      Rate and Rhythm: Normal rate and regular rhythm  Heart sounds: Normal heart sounds  No murmur  No friction rub  No gallop  Pulmonary:      Effort: Pulmonary effort is normal  No respiratory distress  Breath sounds: Normal breath sounds  No wheezing or rales  Chest:      Chest wall: No tenderness  Musculoskeletal: Normal range of motion  General: No tenderness or deformity  Lymphadenopathy:      Cervical: No cervical adenopathy  Skin:     General: Skin is warm and dry  Neurological:      Mental Status: He is alert and oriented to person, place, and time  Diagnostics:  I have personally reviewed pertinent reports

## 2021-03-17 ENCOUNTER — APPOINTMENT (OUTPATIENT)
Dept: PULMONOLOGY | Facility: CLINIC | Age: 81
End: 2021-03-17
Payer: COMMERCIAL

## 2021-03-21 DIAGNOSIS — R19.5 POSITIVE COLORECTAL CANCER SCREENING USING COLOGUARD TEST: Primary | ICD-10-CM

## 2021-03-22 ENCOUNTER — APPOINTMENT (OUTPATIENT)
Dept: PULMONOLOGY | Facility: CLINIC | Age: 81
End: 2021-03-22
Payer: COMMERCIAL

## 2021-03-24 ENCOUNTER — APPOINTMENT (OUTPATIENT)
Dept: PULMONOLOGY | Facility: CLINIC | Age: 81
End: 2021-03-24
Payer: COMMERCIAL

## 2021-03-29 ENCOUNTER — APPOINTMENT (OUTPATIENT)
Dept: PULMONOLOGY | Facility: CLINIC | Age: 81
End: 2021-03-29
Payer: COMMERCIAL

## 2021-03-31 ENCOUNTER — APPOINTMENT (OUTPATIENT)
Dept: PULMONOLOGY | Facility: CLINIC | Age: 81
End: 2021-03-31
Payer: COMMERCIAL

## 2021-04-23 ENCOUNTER — TELEPHONE (OUTPATIENT)
Dept: NEPHROLOGY | Facility: CLINIC | Age: 81
End: 2021-04-23

## 2021-04-23 NOTE — TELEPHONE ENCOUNTER
Patient called  He stated that he had an appt in January that he cancelled  Dr Rudd Letters did a stool sample and stated that he needs to schedule an appt  He can be reached at  [phone]

## 2021-05-18 ENCOUNTER — OFFICE VISIT (OUTPATIENT)
Dept: GASTROENTEROLOGY | Facility: CLINIC | Age: 81
End: 2021-05-18
Payer: COMMERCIAL

## 2021-05-18 VITALS
HEART RATE: 75 BPM | SYSTOLIC BLOOD PRESSURE: 138 MMHG | HEIGHT: 69 IN | BODY MASS INDEX: 25.51 KG/M2 | DIASTOLIC BLOOD PRESSURE: 80 MMHG | WEIGHT: 172.2 LBS

## 2021-05-18 DIAGNOSIS — R19.5 POSITIVE COLORECTAL CANCER SCREENING USING COLOGUARD TEST: Primary | ICD-10-CM

## 2021-05-18 PROCEDURE — 99203 OFFICE O/P NEW LOW 30 MIN: CPT | Performed by: PHYSICIAN ASSISTANT

## 2021-06-15 ENCOUNTER — OFFICE VISIT (OUTPATIENT)
Dept: PULMONOLOGY | Facility: CLINIC | Age: 81
End: 2021-06-15
Payer: COMMERCIAL

## 2021-06-15 VITALS
OXYGEN SATURATION: 94 % | TEMPERATURE: 97 F | DIASTOLIC BLOOD PRESSURE: 80 MMHG | WEIGHT: 172 LBS | HEIGHT: 69 IN | BODY MASS INDEX: 25.48 KG/M2 | SYSTOLIC BLOOD PRESSURE: 126 MMHG | HEART RATE: 98 BPM

## 2021-06-15 DIAGNOSIS — J43.2 CENTRILOBULAR EMPHYSEMA (HCC): ICD-10-CM

## 2021-06-15 DIAGNOSIS — J96.11 CHRONIC HYPOXEMIC RESPIRATORY FAILURE (HCC): ICD-10-CM

## 2021-06-15 DIAGNOSIS — R06.02 SOB (SHORTNESS OF BREATH): ICD-10-CM

## 2021-06-15 DIAGNOSIS — J41.0 SIMPLE CHRONIC BRONCHITIS (HCC): Primary | ICD-10-CM

## 2021-06-15 DIAGNOSIS — J44.9 STAGE 4 VERY SEVERE COPD BY GOLD CLASSIFICATION (HCC): ICD-10-CM

## 2021-06-15 PROCEDURE — 1160F RVW MEDS BY RX/DR IN RCRD: CPT | Performed by: INTERNAL MEDICINE

## 2021-06-15 PROCEDURE — 99214 OFFICE O/P EST MOD 30 MIN: CPT | Performed by: INTERNAL MEDICINE

## 2021-06-15 PROCEDURE — 1036F TOBACCO NON-USER: CPT | Performed by: INTERNAL MEDICINE

## 2021-06-15 NOTE — PROGRESS NOTES
Assessment/Plan:   Diagnoses and all orders for this visit:    Simple chronic bronchitis (HCC)    Centrilobular emphysema (HCC)    SOB (shortness of breath)    Chronic hypoxemic respiratory failure (HCC)    Stage 4 very severe COPD by GOLD classification ([address] )        Chronic simple bronchitis with extensive smoking history who quit about 7 years ago  Had a greater than 45 pack-year smoking history  PFTs with severe obstructive ventilatory limitation with an FEV1 of 31% and his lung volumes have been decreased 56% predicted total lung capacity along with DLCO at severely decreased at 30%  6 minutes walk test, could walk a total distance of 213 m in 6 minutes needing supplemental oxygen 2 L of oxygen by nasal cannula      CT of the chest recently done in August of 2020 again reviewed with centrilobular as well as panlobular emphysema with no evidence of any lung nodules  Not a candidate for CT lung screening will repeat CT of the chest as needed p r n  For any indication as needed  He does not tolerate inhaled corticosteroids he states with the history of thrush and sore throat  Discussed with the patient to continue with Anoro 1 puff daily  Discussed the the long-acting and short-acting bronchodilators is in COPD understands and verbalizes  Continue with Ventolin MDI 2 puffs 4 times daily as needed  He also states he does not tolerate albuterol via nebulizer which makes him feel worse and he has not been using it  Also shortness of breath and dyspnea on exertion likely multifactorial  to his COPD severe,  also likely pulmonary hypertension given his extensive COPD  I have asked him to continue with the 2 L of oxygen on exertion as well as nocturnal   Given multiple exacerbations this year I have discussed with him regarding Daliresp side effects of the medication discussed with the patient Daliresp 250 mg 1 tablet daily  he has been taking this medication for a month now, tolerating it well      He did go for pulmonary rehab a few sessions and he states he has significant back pain that is limiting his exercise there and he does not want to go and he stop the sessions  she does not want any evaluation and advance [address] for lung transplant evaluation given his age he states, I have discussed with him regarding other treatment options with lung volume reduction by valve , wants to think about it at and will readdress again next visit and place in a referral if he agrees  Will follow-up with his echocardiogram that was done in [facility]   Return in about 6 months (around 12/15/2021)  All questions are answered to the patient's satisfaction and understanding  He verbalizes understanding  He is encouraged to call with any further questions or concerns  Portions of the record may have been created with voice recognition software  Occasional wrong word or "sound a like" substitutions may have occurred due to the inherent limitations of voice recognition software  Read the chart carefully and recognize, using context, where substitutions have occurred  Electronically Signed by Mata Banuelos MD    ______________________________________________________________________    Chief Complaint: No chief complaint on file  Patient ID: Geni Whyte is a 68 y o  y o  male has a past medical history of Arthritis, COPD (chronic obstructive pulmonary disease) ([address] ), and Kidney stones  6/15/2021  Patient presents today for follow-up visit    Patient is a very pleasant 68-year-old gentleman, former smoker with greater than 45 pack-year smoking history who quit about 7 years ago, with history of COPD following up at [facility] pulmonology,  He has had 3-4 exacerbations of his COPD this year, and 2 hospital admissions 1 in August and another 1 again in November of 2020,   He does have 2 L of oxygen for use on exertion as well as nocturnal   He does states shortness of breath and dyspnea on exertion which has been gradually worsening   he is here for follow-up       Review of Systems   Constitutional: Positive for fatigue  HENT: Positive for sinus pressure  Eyes: Negative  Respiratory: Positive for cough and shortness of breath  Cardiovascular: Negative  Gastrointestinal: Negative  Endocrine: Negative  Genitourinary: Negative  Musculoskeletal: Negative  Allergic/Immunologic: Positive for environmental allergies  Neurological: Negative  Hematological: Negative  Smoking history: He reports that he quit smoking about 7 years ago  His smoking use included cigars  He started smoking about 52 years ago  He has a 22 50 pack-year smoking history   He has never used smokeless tobacco     The following portions of the patient's history were reviewed and updated as appropriate: allergies, current medications, past family history, past medical history, past social history, past surgical history and problem list     Immunization History   Administered Date(s) Administered    INFLUENZA 10/04/2018, 11/30/2020    Influenza Split High Dose Preservative Free IM 10/04/2018, 09/19/2019    Pneumococcal Conjugate 13-Valent 02/10/2017, 09/19/2019    SARS-CoV-2 / COVID-19 mRNA IM (Moderna) 01/28/2021, 02/24/2021    Td (adult), Not Adsorbed 11/27/2016     Current Outpatient Medications   Medication Sig Dispense Refill    Anoro Ellipta 62 5-25 MCG/INH inhaler Inhale 1 puff daily 1 Inhaler 5    fluticasone (FLONASE) 50 mcg/act nasal spray 2 sprays into each nostril daily 1 Bottle 5    levalbuterol (XOPENEX) 1 25 mg/0 5 mL nebulizer solution Take 0 5 mL (1 25 mg total) by nebulization every 6 (six) hours as needed for wheezing 30 vial 0    omeprazole (PriLOSEC) 20 mg delayed release capsule Take 1 capsule (20 mg total) by mouth daily 90 capsule 5    roflumilast (DALIRESP) 250 MCG tablet Take 1 tablet (250 mcg total) by mouth daily 30 tablet 5    rosuvastatin (CRESTOR) 20 MG tablet Take 1 tablet (20 mg total) by mouth daily 90 tablet 5    Ventolin  (90 Base) MCG/ACT inhaler Inhale 2 puffs every 4 (four) hours as needed for shortness of breath 1 Inhaler 5    betamethasone, augmented, (DIPROLENE) 0 05 % lotion Apply topically daily To scalp (Patient not taking: Reported on 11/12/2020) 60 mL 3    celecoxib (CeleBREX) 200 mg capsule Take 1 capsule (200 mg total) by mouth 2 (two) times a day (Patient not taking: Reported on 11/12/2020)      guaiFENesin (MUCINEX) 600 mg 12 hr tablet Take 1 tablet (600 mg total) by mouth 2 (two) times a day (Patient not taking: Reported on 3/15/2021) 20 tablet 0    triamcinolone (KENALOG) 0 1 % cream Apply topically 3 (three) times a day (Patient not taking: Reported on 11/12/2020) 30 g 5     No current facility-administered medications for this visit  Allergies: Codeine, Cortisone, Penicillin g, and Penicillins    Objective:  Vitals:    06/15/21 0851   BP: 126/80   Pulse: 98   Temp: (!) 97 °F (36 1 °C)   SpO2: 94%   Weight: 78 kg (172 lb)   Height: 5' 9" (1 753 m)   Oxygen Therapy  SpO2: 94 %    Wt Readings from Last 3 Encounters:   06/15/21 78 kg (172 lb)   05/18/21 78 1 kg (172 lb 3 2 oz)   03/15/21 78 9 kg (174 lb)     Body mass index is 25 4 kg/m²  Physical Exam  Vitals and nursing note reviewed  Constitutional:       Appearance: He is well-developed  HENT:      Head: Normocephalic and atraumatic  Eyes:      Conjunctiva/sclera: Conjunctivae normal       Pupils: Pupils are equal, round, and reactive to light  Neck:      Thyroid: No thyromegaly  Vascular: No JVD  Cardiovascular:      Rate and Rhythm: Normal rate and regular rhythm  Heart sounds: Normal heart sounds  No murmur heard  No friction rub  No gallop  Pulmonary:      Effort: Pulmonary effort is normal  No respiratory distress  Breath sounds: Normal breath sounds  No wheezing or rales  Chest:      Chest wall: No tenderness     Musculoskeletal:         General: No tenderness or deformity  Normal range of motion  Cervical back: Normal range of motion and neck supple  Lymphadenopathy:      Cervical: No cervical adenopathy  Skin:     General: Skin is warm and dry  Neurological:      Mental Status: He is alert and oriented to person, place, and time  Diagnostics:  I have personally reviewed pertinent reports      none pertinent

## 2021-07-12 ENCOUNTER — OFFICE VISIT (OUTPATIENT)
Dept: FAMILY MEDICINE CLINIC | Facility: CLINIC | Age: 81
End: 2021-07-12
Payer: COMMERCIAL

## 2021-07-12 VITALS
TEMPERATURE: 97.8 F | BODY MASS INDEX: 24.88 KG/M2 | SYSTOLIC BLOOD PRESSURE: 140 MMHG | OXYGEN SATURATION: 97 % | DIASTOLIC BLOOD PRESSURE: 78 MMHG | WEIGHT: 168 LBS | HEART RATE: 106 BPM | HEIGHT: 69 IN

## 2021-07-12 DIAGNOSIS — J44.1 COPD EXACERBATION (HCC): Primary | ICD-10-CM

## 2021-07-12 PROCEDURE — 3725F SCREEN DEPRESSION PERFORMED: CPT | Performed by: FAMILY MEDICINE

## 2021-07-12 PROCEDURE — 1036F TOBACCO NON-USER: CPT | Performed by: FAMILY MEDICINE

## 2021-07-12 PROCEDURE — 99213 OFFICE O/P EST LOW 20 MIN: CPT | Performed by: FAMILY MEDICINE

## 2021-07-12 PROCEDURE — 1160F RVW MEDS BY RX/DR IN RCRD: CPT | Performed by: FAMILY MEDICINE

## 2021-07-12 RX ORDER — AZITHROMYCIN 250 MG/1
TABLET, FILM COATED ORAL
Qty: 6 TABLET | Refills: 0 | Status: SHIPPED | OUTPATIENT
Start: 2021-07-12 | End: 2021-07-17

## 2021-07-12 RX ORDER — PREDNISONE 10 MG/1
50 TABLET ORAL DAILY
Qty: 25 TABLET | Refills: 0 | Status: SHIPPED | OUTPATIENT
Start: 2021-07-12 | End: 2022-03-07

## 2021-07-12 RX ORDER — GUAIFENESIN 600 MG
600 TABLET, EXTENDED RELEASE 12 HR ORAL 2 TIMES DAILY
Qty: 20 TABLET | Refills: 0 | Status: SHIPPED | OUTPATIENT
Start: 2021-07-12 | End: 2022-03-07

## 2021-07-12 NOTE — PROGRESS NOTES
Assessment/Plan:    No problem-specific Assessment & Plan notes found for this encounter  Diagnoses and all orders for this visit:    COPD exacerbation ([facility] [address] )  -     guaiFENesin (MUCINEX) 600 mg 12 hr tablet; Take 1 tablet (600 mg total) by mouth 2 (two) times a day  -     azithromycin (Zithromax) 250 mg tablet; Take 2 tablets (500 mg total) by mouth daily for 1 day, THEN 1 tablet (250 mg total) daily for 4 days  -     predniSONE 10 mg tablet; Take 5 tablets (50 mg total) by mouth daily  -     XR chest pa & lateral; Future      Discussed the desire for chest XR due to decrease air movement in the left lower lung field with some rhonchi  Patient recently completed steroid for back and was around his granddaughter who was sick  CXR to rule out possible PNA  Patient to complete CXR if symptoms do not improved after steroids and ABX today/tomorrow  If symptoms worsen, patient to call the office or seek evaluation in the ED  Subjective:      Patient ID: Dino Mcnulty is a 68 y o  male  HPI  Patient presents to the office with his wife for a sick visit  Notes that he started with chest congestion about 2-3, worse over night  Using Oxygen PRN at home at baseline, has been using much more  SOB on exertion or with mask  Coughing up sputum, yellow/white  No sputum production on a normal day  Has been using his inhaler a little bit more  Sometimes helps with the SOB  Denies fever or chills  Sometimes with lightheadedness with coughing spell  Some fatigue  Notes that he has some nasal congestion  Denies chest pain  Finished steroids today, was on them for his back pain  Was this his granddaughter who is sick, 7/5/21  Patient has had COVID vaccines       The following portions of the patient's history were reviewed and updated as appropriate: allergies, current medications, past family history, past medical history, past social history, past surgical history and problem list   Review of Systems Constitutional: Positive for fatigue  Negative for chills and fever  HENT: Positive for congestion  Negative for ear pain, rhinorrhea, sinus pain and sore throat  Respiratory: Positive for cough and shortness of breath  Cardiovascular: Negative for chest pain, palpitations and leg swelling  Gastrointestinal: Negative for abdominal pain, constipation, diarrhea, nausea and vomiting  Genitourinary: Negative for difficulty urinating, dysuria, frequency and urgency  Skin: Negative for rash  Neurological: Negative for dizziness, light-headedness and headaches  Objective:  /78 (BP Location: Left arm, Patient Position: Sitting, Cuff Size: Adult)   Pulse (!) 106   Temp 97 8 °F (36 6 °C)   Ht 5' 9" (1 753 m)   Wt 76 2 kg (168 lb)   SpO2 97%   BMI 24 81 kg/m²      Physical Exam  Vitals reviewed  Constitutional:       General: He is not in acute distress  Appearance: Normal appearance  HENT:      Head: Normocephalic and atraumatic  Right Ear: External ear normal       Left Ear: External ear normal       Nose: Nose normal       Mouth/Throat:      Mouth: Mucous membranes are moist    Eyes:      Extraocular Movements: Extraocular movements intact  Conjunctiva/sclera: Conjunctivae normal       Pupils: Pupils are equal, round, and reactive to light  Cardiovascular:      Rate and Rhythm: Normal rate and regular rhythm  Heart sounds: Normal heart sounds  Pulmonary:      Effort: Tachypnea and respiratory distress present  Breath sounds: Examination of the right-lower field reveals decreased breath sounds  Examination of the left-lower field reveals decreased breath sounds  Decreased breath sounds present  Abdominal:      General: Abdomen is flat  Bowel sounds are normal  There is no distension  Tenderness: There is no abdominal tenderness  Musculoskeletal:      Right lower leg: No edema  Left lower leg: No edema  Skin:     General: Skin is warm  Capillary Refill: Capillary refill takes less than 2 seconds  Neurological:      Mental Status: He is alert  Mental status is at baseline           DO Grand dilshad Ornelas [facility]  7/12/2021 12:39 PM

## 2021-07-26 ENCOUNTER — HOSPITAL ENCOUNTER (OUTPATIENT)
Dept: RADIOLOGY | Facility: HOSPITAL | Age: 81
Discharge: HOME | End: 2021-07-26
Attending: FAMILY MEDICINE
Payer: COMMERCIAL

## 2021-07-26 ENCOUNTER — TELEPHONE (OUTPATIENT)
Dept: FAMILY MEDICINE CLINIC | Facility: CLINIC | Age: 81
End: 2021-07-26

## 2021-07-26 DIAGNOSIS — J44.1 COPD EXACERBATION (HCC): Primary | ICD-10-CM

## 2021-07-26 DIAGNOSIS — J44.1 COPD EXACERBATION (HCC): ICD-10-CM

## 2021-07-26 PROCEDURE — 71046 X-RAY EXAM CHEST 2 VIEWS: CPT

## 2021-07-26 RX ORDER — AZITHROMYCIN 250 MG/1
TABLET, FILM COATED ORAL
Qty: 6 TABLET | Refills: 0 | Status: SHIPPED | OUTPATIENT
Start: 2021-07-26 | End: 2021-07-31

## 2021-07-26 NOTE — TELEPHONE ENCOUNTER
Pt and spouse confirmed that the pharmacy never gave him the z-pack  Pt confirmed that he was given 2 scripts (Prednisone, and Mucinex tablets) Pt would like Z-pack sent in

## 2021-07-26 NOTE — TELEPHONE ENCOUNTER
T/c from pt -- called to report to Dr Dominic Farrell that he had xray done today and that the medication she gave him didn't help at all -- pt is requesting a zpack instead, as this has helped him in the past

## 2021-07-26 NOTE — TELEPHONE ENCOUNTER
Patient was given a zpack and steroids       DO Angelito Vasquez [facility]  7/26/2021 11:15 AM

## 2021-10-12 DIAGNOSIS — J44.9 CHRONIC OBSTRUCTIVE PULMONARY DISEASE, UNSPECIFIED COPD TYPE (HCC): ICD-10-CM

## 2021-10-28 ENCOUNTER — TELEPHONE (OUTPATIENT)
Dept: FAMILY MEDICINE CLINIC | Facility: CLINIC | Age: 81
End: 2021-10-28

## 2021-10-28 DIAGNOSIS — E78.5 HYPERLIPIDEMIA, UNSPECIFIED HYPERLIPIDEMIA TYPE: Primary | ICD-10-CM

## 2021-10-28 DIAGNOSIS — R73.03 PREDIABETES: ICD-10-CM

## 2021-11-01 DIAGNOSIS — R09.81 HEAD CONGESTION: ICD-10-CM

## 2021-11-01 RX ORDER — FLUTICASONE PROPIONATE 50 MCG
SPRAY, SUSPENSION (ML) NASAL
Qty: 48 ML | Refills: 1 | Status: SHIPPED | OUTPATIENT
Start: 2021-11-01

## 2021-11-11 ENCOUNTER — TELEPHONE (OUTPATIENT)
Dept: PULMONOLOGY | Facility: CLINIC | Age: 81
End: 2021-11-11

## 2021-11-16 ENCOUNTER — VBI (OUTPATIENT)
Dept: ADMINISTRATIVE | Facility: OTHER | Age: 81
End: 2021-11-16

## 2021-11-23 ENCOUNTER — IMMUNIZATIONS (OUTPATIENT)
Dept: FAMILY MEDICINE CLINIC | Facility: CLINIC | Age: 81
End: 2021-11-23
Payer: COMMERCIAL

## 2021-11-23 DIAGNOSIS — Z23 ENCOUNTER FOR IMMUNIZATION: Primary | ICD-10-CM

## 2021-11-23 PROCEDURE — 90662 IIV NO PRSV INCREASED AG IM: CPT

## 2021-11-23 PROCEDURE — G0008 ADMIN INFLUENZA VIRUS VAC: HCPCS

## 2021-12-15 ENCOUNTER — IMMUNIZATIONS (OUTPATIENT)
Dept: FAMILY MEDICINE CLINIC | Facility: HOSPITAL | Age: 81
End: 2021-12-15

## 2021-12-15 DIAGNOSIS — Z23 ENCOUNTER FOR IMMUNIZATION: Primary | ICD-10-CM

## 2021-12-15 PROCEDURE — 91306 COVID-19 MODERNA VACC 0.25 ML BOOSTER: CPT

## 2021-12-15 PROCEDURE — 0064A COVID-19 MODERNA VACC 0.25 ML BOOSTER: CPT

## 2022-01-06 ENCOUNTER — VBI (OUTPATIENT)
Dept: ADMINISTRATIVE | Facility: OTHER | Age: 82
End: 2022-01-06

## 2022-02-17 ENCOUNTER — TELEPHONE (OUTPATIENT)
Dept: FAMILY MEDICINE CLINIC | Facility: CLINIC | Age: 82
End: 2022-02-17

## 2022-02-17 DIAGNOSIS — M51.9 LUMBAR DISC DISEASE: Primary | ICD-10-CM

## 2022-02-17 RX ORDER — NABUMETONE 500 MG/1
500 TABLET, FILM COATED ORAL 2 TIMES DAILY PRN
Qty: 60 TABLET | Refills: 5 | Status: SHIPPED | OUTPATIENT
Start: 2022-02-17

## 2022-02-17 NOTE — TELEPHONE ENCOUNTER
I took a call from pt -     Pt reports taking Nabumetone 500 mg twice a day for years (for back pain) - originally prescribed by Anna Pavon - pt called doctors office this morning for refills as he is  almost of out pills and found out that doctor Jeana left the practice  Pt is asking f you would be able to renew his meds for pt - pt scheduled annual 3/7/22 with you and is getting blood work done  PAIN MANAGEMENT : NOT ON FILE  PDMP REVIEWED : YES   Patient not found in [place] [address]      Please advise

## 2022-02-21 ENCOUNTER — RA CDI HCC (OUTPATIENT)
Dept: OTHER | Facility: HOSPITAL | Age: 82
End: 2022-02-21

## 2022-02-21 NOTE — PROGRESS NOTES
"likely" pulmonary htn noted 6/15/21 but no definitive dx    [facility] [address]  coding opportunities       Chart reviewed, no opportunity found: CHART REVIEWED, NO OPPORTUNITY FOUND                        Patients insurance company: [employer] (Medicare Advantage and Commercial)

## 2022-02-22 ENCOUNTER — HOSPITAL ENCOUNTER (OUTPATIENT)
Dept: RADIOLOGY | Facility: HOSPITAL | Age: 82
Discharge: HOME | End: 2022-02-22
Payer: COMMERCIAL

## 2022-02-22 ENCOUNTER — HOSPITAL ENCOUNTER (OUTPATIENT)
Dept: RADIOLOGY | Facility: HOSPITAL | Age: 82
End: 2022-02-22
Payer: COMMERCIAL

## 2022-02-22 ENCOUNTER — APPOINTMENT (OUTPATIENT)
Dept: LAB | Facility: HOSPITAL | Age: 82
End: 2022-02-22
Payer: COMMERCIAL

## 2022-02-22 DIAGNOSIS — Z12.5 ENCOUNTER FOR SCREENING FOR MALIGNANT NEOPLASM OF PROSTATE: ICD-10-CM

## 2022-02-22 DIAGNOSIS — N20.0 CALCULUS OF KIDNEY: ICD-10-CM

## 2022-02-22 DIAGNOSIS — R73.03 PREDIABETES: ICD-10-CM

## 2022-02-22 DIAGNOSIS — E78.5 HYPERLIPIDEMIA, UNSPECIFIED HYPERLIPIDEMIA TYPE: ICD-10-CM

## 2022-02-22 LAB
ALBUMIN SERPL BCP-MCNC: 3.9 G/DL (ref 3.5–5)
ALP SERPL-CCNC: 72 U/L (ref 46–116)
ALT SERPL W P-5'-P-CCNC: 28 U/L (ref 12–78)
ANION GAP SERPL CALCULATED.3IONS-SCNC: 8 MMOL/L (ref 4–13)
AST SERPL W P-5'-P-CCNC: 20 U/L (ref 5–45)
BASOPHILS # BLD AUTO: 0.04 THOUSANDS/ΜL (ref 0–0.1)
BASOPHILS NFR BLD AUTO: 1 % (ref 0–1)
BILIRUB SERPL-MCNC: 0.73 MG/DL (ref 0.2–1)
BUN SERPL-MCNC: 19 MG/DL (ref 5–25)
CALCIUM SERPL-MCNC: 8.9 MG/DL (ref 8.3–10.1)
CHLORIDE SERPL-SCNC: 103 MMOL/L (ref 100–108)
CHOLEST SERPL-MCNC: 163 MG/DL
CO2 SERPL-SCNC: 31 MMOL/L (ref 21–32)
CREAT SERPL-MCNC: 1.21 MG/DL (ref 0.6–1.3)
EOSINOPHIL # BLD AUTO: 0.41 THOUSAND/ΜL (ref 0–0.61)
EOSINOPHIL NFR BLD AUTO: 5 % (ref 0–6)
ERYTHROCYTE [DISTWIDTH] IN BLOOD BY AUTOMATED COUNT: 13.2 % (ref 11.6–15.1)
EST. AVERAGE GLUCOSE BLD GHB EST-MCNC: 123 MG/DL
GFR SERPL CREATININE-BSD FRML MDRD: 56 ML/MIN/1.73SQ M
GLUCOSE P FAST SERPL-MCNC: 113 MG/DL (ref 65–99)
HBA1C MFR BLD: 5.9 %
HCT VFR BLD AUTO: 51.4 % (ref 36.5–49.3)
HDLC SERPL-MCNC: 48 MG/DL
HGB BLD-MCNC: 16.8 G/DL (ref 12–17)
IMM GRANULOCYTES # BLD AUTO: 0.02 THOUSAND/UL (ref 0–0.2)
IMM GRANULOCYTES NFR BLD AUTO: 0 % (ref 0–2)
LDLC SERPL CALC-MCNC: 74 MG/DL (ref 0–100)
LYMPHOCYTES # BLD AUTO: 1.25 THOUSANDS/ΜL (ref 0.6–4.47)
LYMPHOCYTES NFR BLD AUTO: 16 % (ref 14–44)
MCH RBC QN AUTO: 30.5 PG (ref 26.8–34.3)
MCHC RBC AUTO-ENTMCNC: 32.7 G/DL (ref 31.4–37.4)
MCV RBC AUTO: 93 FL (ref 82–98)
MONOCYTES # BLD AUTO: 0.79 THOUSAND/ΜL (ref 0.17–1.22)
MONOCYTES NFR BLD AUTO: 10 % (ref 4–12)
NEUTROPHILS # BLD AUTO: 5.17 THOUSANDS/ΜL (ref 1.85–7.62)
NEUTS SEG NFR BLD AUTO: 68 % (ref 43–75)
NONHDLC SERPL-MCNC: 115 MG/DL
NRBC BLD AUTO-RTO: 0 /100 WBCS
PLATELET # BLD AUTO: 161 THOUSANDS/UL (ref 149–390)
PMV BLD AUTO: 11.5 FL (ref 8.9–12.7)
POTASSIUM SERPL-SCNC: 4.4 MMOL/L (ref 3.5–5.3)
PROT SERPL-MCNC: 7.3 G/DL (ref 6.4–8.2)
PSA SERPL-MCNC: 1.1 NG/ML (ref 0–4)
RBC # BLD AUTO: 5.51 MILLION/UL (ref 3.88–5.62)
SODIUM SERPL-SCNC: 142 MMOL/L (ref 136–145)
TRIGL SERPL-MCNC: 205 MG/DL
WBC # BLD AUTO: 7.68 THOUSAND/UL (ref 4.31–10.16)

## 2022-02-22 PROCEDURE — 80061 LIPID PANEL: CPT

## 2022-02-22 PROCEDURE — 83036 HEMOGLOBIN GLYCOSYLATED A1C: CPT

## 2022-02-22 PROCEDURE — 36415 COLL VENOUS BLD VENIPUNCTURE: CPT

## 2022-02-22 PROCEDURE — G0103 PSA SCREENING: HCPCS

## 2022-02-22 PROCEDURE — 80053 COMPREHEN METABOLIC PANEL: CPT

## 2022-02-22 PROCEDURE — 74018 RADEX ABDOMEN 1 VIEW: CPT

## 2022-02-22 PROCEDURE — 85025 COMPLETE CBC W/AUTO DIFF WBC: CPT

## 2022-03-07 ENCOUNTER — OFFICE VISIT (OUTPATIENT)
Dept: FAMILY MEDICINE CLINIC | Facility: CLINIC | Age: 82
End: 2022-03-07
Payer: COMMERCIAL

## 2022-03-07 VITALS
HEART RATE: 78 BPM | DIASTOLIC BLOOD PRESSURE: 62 MMHG | HEIGHT: 69 IN | WEIGHT: 169.6 LBS | OXYGEN SATURATION: 94 % | BODY MASS INDEX: 25.12 KG/M2 | SYSTOLIC BLOOD PRESSURE: 110 MMHG

## 2022-03-07 DIAGNOSIS — Z87.891 FORMER SMOKER: ICD-10-CM

## 2022-03-07 DIAGNOSIS — M51.9 LUMBAR DISC DISEASE: ICD-10-CM

## 2022-03-07 DIAGNOSIS — N20.0 KIDNEY STONES: ICD-10-CM

## 2022-03-07 DIAGNOSIS — K21.9 GASTROESOPHAGEAL REFLUX DISEASE, UNSPECIFIED WHETHER ESOPHAGITIS PRESENT: ICD-10-CM

## 2022-03-07 DIAGNOSIS — J96.11 CHRONIC HYPOXEMIC RESPIRATORY FAILURE (HCC): ICD-10-CM

## 2022-03-07 DIAGNOSIS — Z00.00 MEDICARE ANNUAL WELLNESS VISIT, SUBSEQUENT: Primary | ICD-10-CM

## 2022-03-07 DIAGNOSIS — E78.5 HYPERLIPIDEMIA, UNSPECIFIED HYPERLIPIDEMIA TYPE: ICD-10-CM

## 2022-03-07 DIAGNOSIS — N18.31 STAGE 3A CHRONIC KIDNEY DISEASE (HCC): ICD-10-CM

## 2022-03-07 DIAGNOSIS — J43.2 CENTRILOBULAR EMPHYSEMA (HCC): ICD-10-CM

## 2022-03-07 DIAGNOSIS — R73.03 PREDIABETES: ICD-10-CM

## 2022-03-07 DIAGNOSIS — I71.4 AAA (ABDOMINAL AORTIC ANEURYSM) WITHOUT RUPTURE (HCC): ICD-10-CM

## 2022-03-07 PROBLEM — Z13.89 SCREENING FOR SKIN CONDITION: Status: RESOLVED | Noted: 2018-06-12 | Resolved: 2022-03-07

## 2022-03-07 PROCEDURE — G0439 PPPS, SUBSEQ VISIT: HCPCS | Performed by: FAMILY MEDICINE

## 2022-03-07 PROCEDURE — 1170F FXNL STATUS ASSESSED: CPT | Performed by: FAMILY MEDICINE

## 2022-03-07 PROCEDURE — 99214 OFFICE O/P EST MOD 30 MIN: CPT | Performed by: FAMILY MEDICINE

## 2022-03-07 PROCEDURE — 1125F AMNT PAIN NOTED PAIN PRSNT: CPT | Performed by: FAMILY MEDICINE

## 2022-03-07 PROCEDURE — 1101F PT FALLS ASSESS-DOCD LE1/YR: CPT | Performed by: FAMILY MEDICINE

## 2022-03-07 PROCEDURE — 3288F FALL RISK ASSESSMENT DOCD: CPT | Performed by: FAMILY MEDICINE

## 2022-03-07 PROCEDURE — 90732 PPSV23 VACC 2 YRS+ SUBQ/IM: CPT

## 2022-03-07 PROCEDURE — G0009 ADMIN PNEUMOCOCCAL VACCINE: HCPCS

## 2022-03-07 PROCEDURE — 3725F SCREEN DEPRESSION PERFORMED: CPT | Performed by: FAMILY MEDICINE

## 2022-03-07 NOTE — PATIENT INSTRUCTIONS
Medicare Preventive Visit Patient Instructions  Thank you for completing your Welcome to Medicare Visit or Medicare Annual Wellness Visit today  Your next wellness visit will be due in one year (3/8/2023)  The screening/preventive services that you may require over the next 5-10 years are detailed below  Some tests may not apply to you based off risk factors and/or age  Screening tests ordered at today's visit but not completed yet may show as past due  Also, please note that scanned in results may not display below  Preventive Screenings:  Service Recommendations Previous Testing/Comments   Colorectal Cancer Screening  · Colonoscopy    · Fecal Occult Blood Test (FOBT)/Fecal Immunochemical Test (FIT)  · Fecal DNA/Cologuard Test  · Flexible Sigmoidoscopy Age: 54-65 years old   Colonoscopy: every 10 years (May be performed more frequently if at higher risk)  OR  FOBT/FIT: every 1 year  OR  Cologuard: every 3 years  OR  Sigmoidoscopy: every 5 years  Screening may be recommended earlier than age 48 if at higher risk for colorectal cancer  Also, an individualized decision between you and your healthcare provider will decide whether screening between the ages of 74-80 would be appropriate   Colonoscopy: 03/09/2011  FOBT/FIT: Not on file  Cologuard: 03/21/2021  Sigmoidoscopy: Not on file          Prostate Cancer Screening Individualized decision between patient and health care provider in men between ages of 53-78   Medicare will cover every 12 months beginning on the day after your 50th birthday PSA: 1 1 ng/mL     Screening Not Indicated     Hepatitis C Screening Once for adults born between [facility]  More frequently in patients at high risk for Hepatitis C Hep C Antibody: Not on file        Diabetes Screening 1-2 times per year if you're at risk for diabetes or have pre-diabetes Fasting glucose: 113 mg/dL   A1C: 5 9 %    Screening Current   Cholesterol Screening Once every 5 years if you don't have a lipid disorder  May order more often based on risk factors  Lipid panel: 02/22/2022    Screening Not Indicated  History Lipid Disorder      Other Preventive Screenings Covered by Medicare:  1  Abdominal Aortic Aneurysm (AAA) Screening: covered once if your at risk  You're considered to be at risk if you have a family history of AAA or a male between the age of 73-68 who smoking at least 100 cigarettes in your lifetime  2  Lung Cancer Screening: covers low dose CT scan once per year if you meet all of the following conditions: (1) Age 50-69; (2) No signs or symptoms of lung cancer; (3) Current smoker or have quit smoking within the last 15 years; (4) You have a tobacco smoking history of at least 30 pack years (packs per day x number of years you smoked); (5) You get a written order from a healthcare provider  3  Glaucoma Screening: covered annually if you're considered high risk: (1) You have diabetes OR (2) Family history of glaucoma OR (3)  aged 48 and older OR (3)  American aged 72 and older  3  Osteoporosis Screening: covered every 2 years if you meet one of the following conditions: (1) Have a vertebral abnormality; (2) On glucocorticoid therapy for more than 3 months; (3) Have primary hyperparathyroidism; (4) On osteoporosis medications and need to assess response to drug therapy  5  HIV Screening: covered annually if you're between the age of 12-76  Also covered annually if you are younger than 13 and older than 72 with risk factors for HIV infection  For pregnant patients, it is covered up to 3 times per pregnancy      Immunizations:  Immunization Recommendations   Influenza Vaccine Annual influenza vaccination during flu season is recommended for all persons aged >= 6 months who do not have contraindications   Pneumococcal Vaccine (Prevnar and Pneumovax)  * Prevnar = PCV13  * Pneumovax = PPSV23 Adults 25-60 years old: 1-3 doses may be recommended based on certain risk factors  Adults 65+ years old: Prevnar (PCV13) vaccine recommended followed by Pneumovax (PPSV23) vaccine  If already received PPSV23 since turning 65, then PCV13 recommended at least one year after PPSV23 dose  Hepatitis B Vaccine 3 dose series if at intermediate or high risk (ex: diabetes, end stage renal disease, liver disease)   Tetanus (Td) Vaccine - COST NOT COVERED BY MEDICARE PART B Following completion of primary series, a booster dose should be given every 10 years to maintain immunity against tetanus  Td may also be given as tetanus wound prophylaxis  Tdap Vaccine - COST NOT COVERED BY MEDICARE PART B Recommended at least once for all adults  For pregnant patients, recommended with each pregnancy  Shingles Vaccine (Shingrix) - COST NOT COVERED BY MEDICARE PART B  2 shot series recommended in those aged 48 and above     Health Maintenance Due:      Topic Date Due    Hepatitis C Screening  Never done    Lung Cancer Screening  08/20/2021     Immunizations Due:      Topic Date Due    DTaP,Tdap,and Td Vaccines (1 - Tdap) 11/28/2016    Pneumococcal Vaccine: 65+ Years (1 of 2 - PPSV23) 11/14/2019     Advance Directives   What are advance directives? Advance directives are legal documents that state your wishes and plans for medical care  These plans are made ahead of time in case you lose your ability to make decisions for yourself  Advance directives can apply to any medical decision, such as the treatments you want, and if you want to donate organs  What are the types of advance directives? There are many types of advance directives, and each state has rules about how to use them  You may choose a combination of any of the following:  · Living will: This is a written record of the treatment you want  You can also choose which treatments you do not want, which to limit, and which to stop at a certain time  This includes surgery, medicine, IV fluid, and tube feedings     · Durable power of  for healthcare [facility]): This is a written record that states who you want to make healthcare choices for you when you are unable to make them for yourself  This person, called a proxy, is usually a family member or a friend  You may choose more than 1 proxy  · Do not resuscitate (DNR) order:  A DNR order is used in case your heart stops beating or you stop breathing  It is a request not to have certain forms of treatment, such as CPR  A DNR order may be included in other types of advance directives  · Medical directive: This covers the care that you want if you are in a coma, near death, or unable to make decisions for yourself  You can list the treatments you want for each condition  Treatment may include pain medicine, surgery, blood transfusions, dialysis, IV or tube feedings, and a ventilator (breathing machine)  · Values history: This document has questions about your views, beliefs, and how you feel and think about life  This information can help others choose the care that you would choose  Why are advance directives important? An advance directive helps you control your care  Although spoken wishes may be used, it is better to have your wishes written down  Spoken wishes can be misunderstood, or not followed  Treatments may be given even if you do not want them  An advance directive may make it easier for your family to make difficult choices about your care  Weight Management   Why it is important to manage your weight:  Being overweight increases your risk of health conditions such as heart disease, high blood pressure, type 2 diabetes, and certain types of cancer  It can also increase your risk for osteoarthritis, sleep apnea, and other respiratory problems  Aim for a slow, steady weight loss  Even a small amount of weight loss can lower your risk of health problems  How to lose weight safely:  A safe and healthy way to lose weight is to eat fewer calories and get regular exercise   You can lose up about 1 pound a week by decreasing the number of calories you eat by 500 calories each day  Healthy meal plan for weight management:  A healthy meal plan includes a variety of foods, contains fewer calories, and helps you stay healthy  A healthy meal plan includes the following:  · Eat whole-grain foods more often  A healthy meal plan should contain fiber  Fiber is the part of grains, fruits, and vegetables that is not broken down by your body  Whole-grain foods are healthy and provide extra fiber in your diet  Some examples of whole-grain foods are whole-wheat breads and pastas, oatmeal, brown rice, and bulgur  · Eat a variety of vegetables every day  Include dark, leafy greens such as spinach, kale, letty greens, and mustard greens  Eat yellow and orange vegetables such as carrots, sweet potatoes, and winter squash  · Eat a variety of fruits every day  Choose fresh or canned fruit (canned in its own juice or light syrup) instead of juice  Fruit juice has very little or no fiber  · Eat low-fat dairy foods  Drink fat-free (skim) milk or 1% milk  Eat fat-free yogurt and low-fat cottage cheese  Try low-fat cheeses such as mozzarella and other reduced-fat cheeses  · Choose meat and other protein foods that are low in fat  Choose beans or other legumes such as split peas or lentils  Choose fish, skinless poultry (chicken or turkey), or lean cuts of red meat (beef or pork)  Before you cook meat or poultry, cut off any visible fat  · Use less fat and oil  Try baking foods instead of frying them  Add less fat, such as margarine, sour cream, regular salad dressing and mayonnaise to foods  Eat fewer high-fat foods  Some examples of high-fat foods include french fries, doughnuts, ice cream, and cakes  · Eat fewer sweets  Limit foods and drinks that are high in sugar  This includes candy, cookies, regular soda, and sweetened drinks  Exercise:  Exercise at least 30 minutes per day on most days of the week   Some examples of exercise include walking, biking, dancing, and swimming  You can also fit in more physical activity by taking the stairs instead of the elevator or parking farther away from stores  Ask your healthcare provider about the best exercise plan for you  © Copyright [address] 2018 Information is for End User's use only and may not be sold, redistributed or otherwise used for commercial purposes  All illustrations and images included in CareNotes® are the copyrighted property of [employer]  or [facility] Preventive Visit Patient Instructions  Thank you for completing your Welcome to Medicare Visit or Medicare Annual Wellness Visit today  Your next wellness visit will be due in one year (3/8/2023)  The screening/preventive services that you may require over the next 5-10 years are detailed below  Some tests may not apply to you based off risk factors and/or age  Screening tests ordered at today's visit but not completed yet may show as past due  Also, please note that scanned in results may not display below  Preventive Screenings:  Service Recommendations Previous Testing/Comments   Colorectal Cancer Screening  · Colonoscopy    · Fecal Occult Blood Test (FOBT)/Fecal Immunochemical Test (FIT)  · Fecal DNA/Cologuard Test  · Flexible Sigmoidoscopy Age: 54-65 years old   Colonoscopy: every 10 years (May be performed more frequently if at higher risk)  OR  FOBT/FIT: every 1 year  OR  Cologuard: every 3 years  OR  Sigmoidoscopy: every 5 years  Screening may be recommended earlier than age 48 if at higher risk for colorectal cancer  Also, an individualized decision between you and your healthcare provider will decide whether screening between the ages of 74-80 would be appropriate   Colonoscopy: 03/09/2011  FOBT/FIT: Not on file  Cologuard: 03/21/2021  Sigmoidoscopy: Not on file          Prostate Cancer Screening Individualized decision between patient and health care provider in men between ages of 53-78   Medicare will cover every 12 months beginning on the day after your 50th birthday PSA: 1 1 ng/mL     Screening Not Indicated     Hepatitis C Screening Once for adults born between 1945 and 1965  More frequently in patients at high risk for Hepatitis C Hep C Antibody: Not on file        Diabetes Screening 1-2 times per year if you're at risk for diabetes or have pre-diabetes Fasting glucose: 113 mg/dL   A1C: 5 9 %    Screening Current   Cholesterol Screening Once every 5 years if you don't have a lipid disorder  May order more often based on risk factors  Lipid panel: 02/22/2022    Screening Not Indicated  History Lipid Disorder      Other Preventive Screenings Covered by Medicare:  6  Abdominal Aortic Aneurysm (AAA) Screening: covered once if your at risk  You're considered to be at risk if you have a family history of AAA or a male between the age of 73-68 who smoking at least 100 cigarettes in your lifetime  7  Lung Cancer Screening: covers low dose CT scan once per year if you meet all of the following conditions: (1) Age 50-69; (2) No signs or symptoms of lung cancer; (3) Current smoker or have quit smoking within the last 15 years; (4) You have a tobacco smoking history of at least 30 pack years (packs per day x number of years you smoked); (5) You get a written order from a healthcare provider  8  Glaucoma Screening: covered annually if you're considered high risk: (1) You have diabetes OR (2) Family history of glaucoma OR (3)  aged 48 and older OR (3)  American aged 72 and older  5  Osteoporosis Screening: covered every 2 years if you meet one of the following conditions: (1) Have a vertebral abnormality; (2) On glucocorticoid therapy for more than 3 months; (3) Have primary hyperparathyroidism; (4) On osteoporosis medications and need to assess response to drug therapy  10  HIV Screening: covered annually if you're between the age of 12-76   Also covered annually if you are younger than 13 and older than 72 with risk factors for HIV infection  For pregnant patients, it is covered up to 3 times per pregnancy  Immunizations:  Immunization Recommendations   Influenza Vaccine Annual influenza vaccination during flu season is recommended for all persons aged >= 6 months who do not have contraindications   Pneumococcal Vaccine (Prevnar and Pneumovax)  * Prevnar = PCV13  * Pneumovax = PPSV23 Adults 25-60 years old: 1-3 doses may be recommended based on certain risk factors  Adults 72 years old: Prevnar (PCV13) vaccine recommended followed by Pneumovax (PPSV23) vaccine  If already received PPSV23 since turning 65, then PCV13 recommended at least one year after PPSV23 dose  Hepatitis B Vaccine 3 dose series if at intermediate or high risk (ex: diabetes, end stage renal disease, liver disease)   Tetanus (Td) Vaccine - COST NOT COVERED BY MEDICARE PART B Following completion of primary series, a booster dose should be given every 10 years to maintain immunity against tetanus  Td may also be given as tetanus wound prophylaxis  Tdap Vaccine - COST NOT COVERED BY MEDICARE PART B Recommended at least once for all adults  For pregnant patients, recommended with each pregnancy  Shingles Vaccine (Shingrix) - COST NOT COVERED BY MEDICARE PART B  2 shot series recommended in those aged 48 and above     Health Maintenance Due:      Topic Date Due    Hepatitis C Screening  Never done    Lung Cancer Screening  08/20/2021     Immunizations Due:      Topic Date Due    DTaP,Tdap,and Td Vaccines (1 - Tdap) 11/28/2016    Pneumococcal Vaccine: 65+ Years (1 of 2 - PPSV23) 11/14/2019     Advance Directives   What are advance directives? Advance directives are legal documents that state your wishes and plans for medical care  These plans are made ahead of time in case you lose your ability to make decisions for yourself   Advance directives can apply to any medical decision, such as the treatments you want, and if you want to donate organs  What are the types of advance directives? There are many types of advance directives, and each state has rules about how to use them  You may choose a combination of any of the following:  · Living will: This is a written record of the treatment you want  You can also choose which treatments you do not want, which to limit, and which to stop at a certain time  This includes surgery, medicine, IV fluid, and tube feedings  · Durable power of  for healthcare [facility]): This is a written record that states who you want to make healthcare choices for you when you are unable to make them for yourself  This person, called a proxy, is usually a family member or a friend  You may choose more than 1 proxy  · Do not resuscitate (DNR) order:  A DNR order is used in case your heart stops beating or you stop breathing  It is a request not to have certain forms of treatment, such as CPR  A DNR order may be included in other types of advance directives  · Medical directive: This covers the care that you want if you are in a coma, near death, or unable to make decisions for yourself  You can list the treatments you want for each condition  Treatment may include pain medicine, surgery, blood transfusions, dialysis, IV or tube feedings, and a ventilator (breathing machine)  · Values history: This document has questions about your views, beliefs, and how you feel and think about life  This information can help others choose the care that you would choose  Why are advance directives important? An advance directive helps you control your care  Although spoken wishes may be used, it is better to have your wishes written down  Spoken wishes can be misunderstood, or not followed  Treatments may be given even if you do not want them  An advance directive may make it easier for your family to make difficult choices about your care     Weight Management   Why it is important to manage your weight:  Being overweight increases your risk of health conditions such as heart disease, high blood pressure, type 2 diabetes, and certain types of cancer  It can also increase your risk for osteoarthritis, sleep apnea, and other respiratory problems  Aim for a slow, steady weight loss  Even a small amount of weight loss can lower your risk of health problems  How to lose weight safely:  A safe and healthy way to lose weight is to eat fewer calories and get regular exercise  You can lose up about 1 pound a week by decreasing the number of calories you eat by 500 calories each day  Healthy meal plan for weight management:  A healthy meal plan includes a variety of foods, contains fewer calories, and helps you stay healthy  A healthy meal plan includes the following:  · Eat whole-grain foods more often  A healthy meal plan should contain fiber  Fiber is the part of grains, fruits, and vegetables that is not broken down by your body  Whole-grain foods are healthy and provide extra fiber in your diet  Some examples of whole-grain foods are whole-wheat breads and pastas, oatmeal, brown rice, and bulgur  · Eat a variety of vegetables every day  Include dark, leafy greens such as spinach, kale, letty greens, and mustard greens  Eat yellow and orange vegetables such as carrots, sweet potatoes, and winter squash  · Eat a variety of fruits every day  Choose fresh or canned fruit (canned in its own juice or light syrup) instead of juice  Fruit juice has very little or no fiber  · Eat low-fat dairy foods  Drink fat-free (skim) milk or 1% milk  Eat fat-free yogurt and low-fat cottage cheese  Try low-fat cheeses such as mozzarella and other reduced-fat cheeses  · Choose meat and other protein foods that are low in fat  Choose beans or other legumes such as split peas or lentils  Choose fish, skinless poultry (chicken or turkey), or lean cuts of red meat (beef or pork)   Before you cook meat or poultry, cut off any visible fat  · Use less fat and oil  Try baking foods instead of frying them  Add less fat, such as margarine, sour cream, regular salad dressing and mayonnaise to foods  Eat fewer high-fat foods  Some examples of high-fat foods include french fries, doughnuts, ice cream, and cakes  · Eat fewer sweets  Limit foods and drinks that are high in sugar  This includes candy, cookies, regular soda, and sweetened drinks  Exercise:  Exercise at least 30 minutes per day on most days of the week  Some examples of exercise include walking, biking, dancing, and swimming  You can also fit in more physical activity by taking the stairs instead of the elevator or parking farther away from stores  Ask your healthcare provider about the best exercise plan for you  © Copyright [employer] 2018 Information is for End User's use only and may not be sold, redistributed or otherwise used for commercial purposes   All illustrations and images included in CareNotes® are the copyrighted property of [employer]  or [address] [place] [address]

## 2022-03-07 NOTE — PROGRESS NOTES
Assessment and Plan:     Problem List Items Addressed This Visit     None      Visit Diagnoses     Medicare annual wellness visit, subsequent    -  Primary           Preventive health issues were discussed with patient, and age appropriate screening tests were ordered as noted in patient's After Visit Summary  Personalized health advice and appropriate referrals for health education or preventive services given if needed, as noted in patient's After Visit Summary       History of Present Illness:     Patient presents for Medicare Annual Wellness visit    Patient Care Team:  Gee Durbin MD as PCP - [address] [place] [address] [place], PA-C as Physician Assistant (Physician Assistant)  Kelle Díaz PA-C (Gastroenterology)     Problem List:     Patient Active Problem List   Diagnosis    Hyperlipidemia    Lumbar disc disease    Seborrhea    Actinic keratosis    Psoriasis    Screening for skin condition    Former smoker    Kidney stones    AAA (abdominal aortic aneurysm) without rupture ([address] )    Prediabetes    Centrilobular emphysema ([address] )    Gastroesophageal reflux disease      Past Medical and Surgical History:     Past Medical History:   Diagnosis Date    Arthritis     COPD (chronic obstructive pulmonary disease) ([address] )     Kidney stones      Past Surgical History:   Procedure Laterality Date    ABDOMINAL AORTIC ANEURYSM REPAIR      ENDOVASC REPAIR AAA PLACE VISCERAL EXTENS PROSTH    LAST ASSESSED [id number]    COLONOSCOPY  03/09/2011    HAND SURGERY      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       Family History:     Family History   Problem Relation Age of Onset    Heart failure Mother     Diabetes Mother     Other Mother         MENTAL DISORDER       Social History:     Social History     Socioeconomic History    Marital status: /Civil Union     Spouse name: None    Number of children: None    Years of education: None    Highest education level: None   Occupational History    Occupation: RETIRED    Tobacco Use    Smoking status: Former Smoker     Packs/day: 0 50     Years: 45 00     Pack years: 22 50     Types: Cigars     Start date:      Quit date:      Years since quittin 1    Smokeless tobacco: Never Used   Vaping Use    Vaping Use: Never used   Substance and Sexual Activity    Alcohol use: Yes     Comment: OCCASIONAL     Drug use: No    Sexual activity: None   Other Topics Concern    None   Social History Narrative    ALWAYS USES [employer]    LACK OF PHYSICAL EXERCISE    LIVES WITH SPOUSE      Social Determinants of Health     Financial Resource Strain: Not on file   Food Insecurity: Not on file   Transportation Needs: Not on file   Physical Activity: Not on file   Stress: Not on file   Social Connections: Not on file   Intimate Partner Violence: Not on file   Housing Stability: Not on file      Medications and Allergies:     Current Outpatient Medications   Medication Sig Dispense Refill    Anoro Ellipta 62 5-25 MCG/INH inhaler INHALE 1 PUFF BY MOUTH EVERY DAY 60 blister 5    fluticasone (FLONASE) 50 mcg/act nasal spray SPRAY 2 SPRAYS INTO EACH NOSTRIL EVERY DAY 48 mL 1    levalbuterol (XOPENEX) 1 25 mg/0 5 mL nebulizer solution Take 0 5 mL (1 25 mg total) by nebulization every 6 (six) hours as needed for wheezing 30 vial 0    nabumetone (RELAFEN) 500 mg tablet Take 1 tablet (500 mg total) by mouth 2 (two) times a day as needed (Back pain) 60 tablet 5    omeprazole (PriLOSEC) 20 mg delayed release capsule Take 1 capsule (20 mg total) by mouth daily 90 capsule 5    roflumilast (DALIRESP) 250 MCG tablet Take 1 tablet (250 mcg total) by mouth daily 30 tablet 5    rosuvastatin (CRESTOR) 20 MG tablet Take 1 tablet (20 mg total) by mouth daily 90 tablet 5    triamcinolone (KENALOG) 0 1 % cream Apply topically 3 (three) times a day 30 g 5    Ventolin  (90 Base) MCG/ACT inhaler INHALE 2 PUFFS EVERY 4 (FOUR) HOURS AS NEEDED FOR SHORTNESS OF BREATH 18 g 5  betamethasone, augmented, (DIPROLENE) 0 05 % lotion Apply topically daily To scalp (Patient not taking: Reported on 7/12/2021) 60 mL 3    guaiFENesin (MUCINEX) 600 mg 12 hr tablet Take 1 tablet (600 mg total) by mouth 2 (two) times a day (Patient not taking: Reported on 3/7/2022 ) 20 tablet 0    predniSONE 10 mg tablet Take 5 tablets (50 mg total) by mouth daily (Patient not taking: Reported on 3/7/2022 ) 25 tablet 0     No current facility-administered medications for this visit  Allergies   Allergen Reactions    Codeine Nausea Only    Cortisone      Other reaction(s): Unknown    Penicillin G      Other reaction(s): Unknown    Penicillins       Immunizations:     Immunization History   Administered Date(s) Administered    COVID-19 MODERNA VACC 0 25 ML IM BOOSTER 12/15/2021    COVID-19 MODERNA VACC 0 5 ML IM 01/28/2021, 02/24/2021    INFLUENZA 10/04/2018, 11/30/2020    Influenza Split High Dose Preservative Free IM 10/04/2018, 09/19/2019    Influenza, high dose seasonal 0 7 mL 11/23/2021    Pneumococcal Conjugate 13-Valent 02/10/2017, 09/19/2019    Td (adult), Not Adsorbed 11/27/2016      Health Maintenance:         Topic Date Due    Hepatitis C Screening  Never done    Lung Cancer Screening  08/20/2021         Topic Date Due    DTaP,Tdap,and Td Vaccines (1 - Tdap) 11/28/2016    Pneumococcal Vaccine: 65+ Years (1 of 2 - PPSV23) 11/14/2019      Medicare Health Risk Assessment:     /62 (BP Location: Left arm, Patient Position: Sitting, Cuff Size: Large)   Pulse 78   Ht 5' 9" (1 753 m)   Wt 76 9 kg (169 lb 9 6 oz)   SpO2 94%   BMI 25 05 kg/m²      Leobardo Hoskins is here for his Subsequent Wellness visit  Last Medicare Wellness visit information reviewed, patient interviewed and updates made to the record today  Health Risk Assessment:   Patient rates overall health as good  Patient feels that their physical health rating is same  Patient is satisfied with their life   Eyesight was rated as same  Hearing was rated as same  Patient feels that their emotional and mental health rating is slightly worse  Patients states they are never, rarely angry  Patient states they are sometimes unusually tired/fatigued  Pain experienced in the last 7 days has been some  Patient's pain rating has been 4/10  Patient states that he has experienced no weight loss or gain in last 6 months  Depression Screening:   PHQ-2 Score: 0      Fall Risk Screening: In the past year, patient has experienced: no history of falling in past year      Home Safety:  Patient does not have trouble with stairs inside or outside of their home  Patient has working smoke alarms and has working carbon monoxide detector  Home safety hazards include: none  Nutrition:   Current diet is Regular  Medications:   Patient is currently taking over-the-counter supplements  OTC medications include: see medication list  Patient is able to manage medications  Activities of Daily Living (ADLs)/Instrumental Activities of Daily Living (IADLs):   Walk and transfer into and out of bed and chair?: Yes  Dress and groom yourself?: Yes    Bathe or shower yourself?: Yes    Feed yourself? Yes  Do your laundry/housekeeping?: Yes  Manage your money, pay your bills and track your expenses?: Yes  Make your own meals?: Yes    Do your own shopping?: Yes    Previous Hospitalizations:   Any hospitalizations or ED visits within the last 12 months?: No      Advance Care Planning:   Living will: Yes    Durable POA for healthcare:  Yes    Advanced directive: Yes    Advanced directive counseling given: Yes    Five wishes given: No    End of Life Decisions reviewed with patient: Yes    Provider agrees with end of life decisions: Yes      PREVENTIVE SCREENINGS      Cardiovascular Screening:    General: Screening Not Indicated and History Lipid Disorder      Diabetes Screening:     General: Screening Current      Colorectal Cancer Screening:     General: Screening Current      Prostate Cancer Screening:    General: Screening Not Indicated and Screening Current      Osteoporosis Screening:    General: Screening Not Indicated      Abdominal Aortic Aneurysm (AAA) Screening:    Risk factors include: tobacco use        General: Screening Not Indicated and History AAA      Lung Cancer Screening:     General: Screening Not Indicated      Hepatitis C Screening:    General: Risks and Benefits Discussed    Hep C Screening Accepted: Yes      Screening, Brief Intervention, and Referral to Treatment (SBIRT)    Screening  Typical number of drinks in a day: 0  Typical number of drinks in a week: 0  Interpretation: Low risk drinking behavior        Eddie Marquez MD

## 2022-03-07 NOTE — PROGRESS NOTES
BMI Counseling: Body mass index is 25 05 kg/m²  The BMI is above normal  Nutrition recommendations include decreasing portion sizes and moderation in carbohydrate intake  Exercise recommendations include exercising 3-5 times per week  No pharmacotherapy was ordered  Rationale for BMI follow-up plan is due to patient being overweight or obese  Depression Screening and Follow-up Plan: Patient was screened for depression during today's encounter  They screened negative with a PHQ-2 score of 0  Assessment/Plan:    Return visit in 1 year's time for annual physical      Problem List Items Addressed This Visit        Digestive    Gastroesophageal reflux disease     Continue Prilosec            Respiratory    Centrilobular emphysema (HCC)    Chronic hypoxemic respiratory failure (HCC)     Continue Daliresp, neuro and Xopenex            Cardiovascular and Mediastinum    AAA (abdominal aortic aneurysm) without rupture (HCC)     Follow-up with vascular surgery            Musculoskeletal and Integument    Lumbar disc disease       Genitourinary    Kidney stones     Follow up with Urology         Stage 3a chronic kidney disease ([facility] )       Other    Hyperlipidemia    Former smoker    Relevant Orders    CT lung screening program    Prediabetes     Low carb diet           Other Visit Diagnoses     Medicare annual wellness visit, subsequent    -  Primary            Subjective:      Patient ID: Yeimi Samaniego is a 80 y o  male  Patient comes in for checkup  The following portions of the patient's history were reviewed and updated as appropriate:   Past Medical History:  He has a past medical history of Arthritis, COPD (chronic obstructive pulmonary disease) ([facility] [facility] ), and Kidney stones  ,  _______________________________________________________________________  Medical Problems:  does not have any pertinent problems on file ,  _______________________________________________________________________  Past Surgical History:   has a past surgical history that includes Colonoscopy (03/09/2011); Abdominal aortic aneurysm repair; and Hand surgery  ,  _______________________________________________________________________  Family History:  family history includes Diabetes in his mother; Heart failure in his mother; Other in his mother ,  _______________________________________________________________________  Social History:   reports that he quit smoking about 8 years ago  His smoking use included cigars  He started smoking about 53 years ago  He has a 22 50 pack-year smoking history  He has never used smokeless tobacco  He reports current alcohol use  He reports that he does not use drugs  ,  _______________________________________________________________________  Allergies:  is allergic to codeine, cortisone, penicillin g, and penicillins     _______________________________________________________________________  Current Outpatient Medications   Medication Sig Dispense Refill    Anoro Ellipta 62 5-25 MCG/INH inhaler INHALE 1 PUFF BY MOUTH EVERY DAY 60 blister 5    fluticasone (FLONASE) 50 mcg/act nasal spray SPRAY 2 SPRAYS INTO EACH NOSTRIL EVERY DAY 48 mL 1    levalbuterol (XOPENEX) 1 25 mg/0 5 mL nebulizer solution Take 0 5 mL (1 25 mg total) by nebulization every 6 (six) hours as needed for wheezing 30 vial 0    nabumetone (RELAFEN) 500 mg tablet Take 1 tablet (500 mg total) by mouth 2 (two) times a day as needed (Back pain) 60 tablet 5    omeprazole (PriLOSEC) 20 mg delayed release capsule Take 1 capsule (20 mg total) by mouth daily 90 capsule 5    roflumilast (DALIRESP) 250 MCG tablet Take 1 tablet (250 mcg total) by mouth daily 30 tablet 5    rosuvastatin (CRESTOR) 20 MG tablet Take 1 tablet (20 mg total) by mouth daily 90 tablet 5    triamcinolone (KENALOG) 0 1 % cream Apply topically 3 (three) times a day 30 g 5    Ventolin  (90 Base) MCG/ACT inhaler INHALE 2 PUFFS EVERY 4 (FOUR) HOURS AS NEEDED FOR SHORTNESS OF BREATH 18 g 5    betamethasone, augmented, (DIPROLENE) 0 05 % lotion Apply topically daily To scalp (Patient not taking: Reported on 7/12/2021) 60 mL 3     No current facility-administered medications for this visit      _______________________________________________________________________  Review of Systems   Constitutional: Negative  HENT: Negative  Respiratory: Positive for shortness of breath  Cardiovascular: Negative  Objective:  Vitals:    03/07/22 0825   BP: 110/62   BP Location: Left arm   Patient Position: Sitting   Cuff Size: Large   Pulse: 78   SpO2: 94%   Weight: 76 9 kg (169 lb 9 6 oz)   Height: 5' 9" (1 753 m)     Body mass index is 25 05 kg/m²  Physical Exam  Constitutional:       Appearance: Normal appearance  He is well-developed  HENT:      Head: Normocephalic and atraumatic  Eyes:      Pupils: Pupils are equal, round, and reactive to light  Cardiovascular:      Rate and Rhythm: Normal rate and regular rhythm  Heart sounds: Normal heart sounds  Pulmonary:      Effort: Pulmonary effort is normal       Breath sounds: Normal breath sounds  Abdominal:      General: Bowel sounds are normal       Palpations: Abdomen is soft  Musculoskeletal:         General: Normal range of motion  Cervical back: Neck supple  Lymphadenopathy:      Cervical: No cervical adenopathy  Skin:     General: Skin is warm and dry  Neurological:      Mental Status: He is alert     Psychiatric:         Mood and Affect: Mood normal          Behavior: Behavior normal

## 2022-03-17 ENCOUNTER — OFFICE VISIT (OUTPATIENT)
Dept: PULMONOLOGY | Facility: CLINIC | Age: 82
End: 2022-03-17
Payer: COMMERCIAL

## 2022-03-17 VITALS
OXYGEN SATURATION: 93 % | TEMPERATURE: 97.4 F | HEART RATE: 87 BPM | SYSTOLIC BLOOD PRESSURE: 134 MMHG | DIASTOLIC BLOOD PRESSURE: 90 MMHG | HEIGHT: 69 IN | WEIGHT: 168 LBS | BODY MASS INDEX: 24.88 KG/M2

## 2022-03-17 DIAGNOSIS — J41.0 SIMPLE CHRONIC BRONCHITIS (HCC): ICD-10-CM

## 2022-03-17 DIAGNOSIS — J44.9 STAGE 4 VERY SEVERE COPD BY GOLD CLASSIFICATION (HCC): ICD-10-CM

## 2022-03-17 DIAGNOSIS — R06.02 SOB (SHORTNESS OF BREATH): Primary | ICD-10-CM

## 2022-03-17 PROCEDURE — 4040F PNEUMOC VAC/ADMIN/RCVD: CPT | Performed by: INTERNAL MEDICINE

## 2022-03-17 PROCEDURE — 99214 OFFICE O/P EST MOD 30 MIN: CPT | Performed by: INTERNAL MEDICINE

## 2022-03-17 PROCEDURE — 1036F TOBACCO NON-USER: CPT | Performed by: INTERNAL MEDICINE

## 2022-03-17 PROCEDURE — 1160F RVW MEDS BY RX/DR IN RCRD: CPT | Performed by: INTERNAL MEDICINE

## 2022-03-17 RX ORDER — ALBUTEROL SULFATE 2.5 MG/3ML
2.5 SOLUTION RESPIRATORY (INHALATION) 4 TIMES DAILY
Qty: 1080 ML | Refills: 3 | Status: SHIPPED | OUTPATIENT
Start: 2022-03-17

## 2022-03-17 NOTE — PROGRESS NOTES
Assessment/Plan:   Diagnoses and all orders for this visit:    SOB (shortness of breath)  -     CT chest without contrast; Future    Simple chronic bronchitis (HCC)  -     albuterol (2 5 mg/3 mL) 0 083 % nebulizer solution; Take 3 mL (2 5 mg total) by nebulization 4 (four) times a day    Stage 4 very severe COPD by GOLD classification ([address] )  -     roflumilast (DALIRESP) 250 MCG tablet; Take 1 tablet (250 mcg total) by mouth daily      shortness of breath in this patient with chronic simple bronchitis severe COPD currently not in exacerbation  Chronic simple bronchitis with extensive smoking history who quit about 7 years ago  Had a greater than 45 pack-year smoking history  PFTs with severe obstructive ventilatory limitation with an FEV1 of 31% and his lung volumes have been decreased 56% predicted total lung capacity along with DLCO at severely decreased at 30%  6 minutes walk test, could walk a total distance of 213 m in 6 minutes needing supplemental oxygen 2 L of oxygen by nasal cannula    CT of the chest recently done in August of 2020 again reviewed with centrilobular as well as panlobular emphysema with no evidence of any lung nodules  Given worsening shortness of breath will repeat CT of the chest without contrast and follow-up, he states he wants to schedule the CT chest on his own  He does not tolerate inhaled corticosteroids he states with the history of thrush and sore throat  Discussed with the patient to continue with Anoro 1 puff daily  Discussed the the long-acting and short-acting bronchodilators is in COPD understands and verbalizes  Continue with Ventolin MDI 2 puffs 4 times daily as needed  He was given levalbuterol but his insurance is not paying for it, will switch him to albuterol via nebulizer 4 times daily as needed     Also shortness of breath and dyspnea on exertion likely multifactorial  to his COPD severe,  also likely pulmonary hypertension given his extensive COPD    I have asked him to continue with the 2 L of oxygen on exertion as well as nocturnal   Given multiple exacerbations this year I have discussed with him regarding Daliresp side effects of the medication discussed with the patient Daliresp 250 mg 1 tablet daily     He is currently not taking this medication for a month no states he did not have a refill prior to that he was taking it for 6 months and tolerated it well    He did go for pulmonary rehab a few sessions and he states he has significant back pain that is limiting his exercise there and he does not want to go and he stop the sessions  he does not want any evaluation and advance [address] for lung transplant evaluation given his age he states, I have discussed with him regarding other treatment options with lung volume reduction by valve , wants to think about it at and will readdress again next visit and place in a referral if he agrees  echocardiogram that was done in [facility]  Will see him back in 6 weeks with the CT of the chest   No follow-ups on file  All questions are answered to the patient's satisfaction and understanding  He verbalizes understanding  He is encouraged to call with any further questions or concerns  Portions of the record may have been created with voice recognition software  Occasional wrong word or "sound a like" substitutions may have occurred due to the inherent limitations of voice recognition software  Read the chart carefully and recognize, using context, where substitutions have occurred      Electronically Signed by Mata Banuelos MD    ______________________________________________________________________    Chief Complaint:   Chief Complaint   Patient presents with    Shortness of Breath    Cough    Wheezing       Patient ID: Geni Whyte is a 80 y o  y o  male has a past medical history of Arthritis, COPD (chronic obstructive pulmonary disease) ([address] ), Cough, Kidney stones, SOB (shortness of breath), and Wheezing  3/17/2022  Patient presents today for follow-up visit  Patient is a very pleasant 53-year-old gentleman, former smoker with greater than 45 pack-year smoking history who quit about 7 years ago, with history of COPD following up at [facility] pulmonology,  He has had 3-4 exacerbations of his COPD in 2020 , and 2 hospital admissions 1 in August and another 1 again in November of 2020,   He does have 2 L of oxygen for use on exertion as well as nocturnal   He does states shortness of breath and dyspnea on exertion which has been gradually worsening   He has not followed up in a year he states given his breathing has been gradually worsening he called our office 2 days ago and left a message voicemail he states that he needed an appointment and he called again this morning at 9:30 a m  and he did not get a call back so he thought he would drive to the office and he walked into the office stating that he is short of breath which is worsening and needs to be seen  He stated he is not sick to go into the ER he states  For which he was put on the schedule to be seen  Review of Systems   Constitutional: Negative  HENT: Negative  Eyes: Negative  Respiratory: Positive for cough and shortness of breath  Cardiovascular: Negative  Gastrointestinal: Negative  Endocrine: Negative  Genitourinary: Negative  Musculoskeletal: Negative  Allergic/Immunologic: Negative  Neurological: Negative  Hematological: Negative  Psychiatric/Behavioral: Negative  Smoking history: He reports that he quit smoking about 8 years ago  His smoking use included cigars  He started smoking about 53 years ago  He has a 22 50 pack-year smoking history   He has never used smokeless tobacco     The following portions of the patient's history were reviewed and updated as appropriate: allergies, current medications, past family history, past medical history, past social history, past surgical history and problem list     Immunization History   Administered Date(s) Administered    COVID-19 MODERNA VACC 0 25 ML IM BOOSTER 12/15/2021    COVID-19 MODERNA VACC 0 5 ML IM 01/28/2021, 02/24/2021    INFLUENZA 10/04/2018, 11/30/2020    Influenza Split High Dose Preservative Free IM 10/04/2018, 09/19/2019    Influenza, high dose seasonal 0 7 mL 11/23/2021    Pneumococcal Conjugate 13-Valent 02/10/2017, 09/19/2019    Pneumococcal Polysaccharide PPV23 03/07/2022    Td (adult), Not Adsorbed 11/27/2016     Current Outpatient Medications   Medication Sig Dispense Refill    Anoro Ellipta 62 5-25 MCG/INH inhaler INHALE 1 PUFF BY MOUTH EVERY DAY 60 blister 5    fluticasone (FLONASE) 50 mcg/act nasal spray SPRAY 2 SPRAYS INTO EACH NOSTRIL EVERY DAY 48 mL 1    levalbuterol (XOPENEX) 1 25 mg/0 5 mL nebulizer solution Take 0 5 mL (1 25 mg total) by nebulization every 6 (six) hours as needed for wheezing 30 vial 0    nabumetone (RELAFEN) 500 mg tablet Take 1 tablet (500 mg total) by mouth 2 (two) times a day as needed (Back pain) 60 tablet 5    omeprazole (PriLOSEC) 20 mg delayed release capsule Take 1 capsule (20 mg total) by mouth daily 90 capsule 5    roflumilast (DALIRESP) 250 MCG tablet Take 1 tablet (250 mcg total) by mouth daily 30 tablet 5    rosuvastatin (CRESTOR) 20 MG tablet Take 1 tablet (20 mg total) by mouth daily 90 tablet 5    triamcinolone (KENALOG) 0 1 % cream Apply topically 3 (three) times a day 30 g 5    Ventolin  (90 Base) MCG/ACT inhaler INHALE 2 PUFFS EVERY 4 (FOUR) HOURS AS NEEDED FOR SHORTNESS OF BREATH 18 g 5    albuterol (2 5 mg/3 mL) 0 083 % nebulizer solution Take 3 mL (2 5 mg total) by nebulization 4 (four) times a day 1080 mL 3    betamethasone, augmented, (DIPROLENE) 0 05 % lotion Apply topically daily To scalp (Patient not taking: Reported on 7/12/2021) 60 mL 3     No current facility-administered medications for this visit       Allergies: Codeine, Cortisone, Penicillin g, and Penicillins    Objective:  Vitals:    03/17/22 1352   BP: 134/90   Pulse: 87   Temp: (!) 97 4 °F (36 3 °C)   SpO2: 93%   Weight: 76 2 kg (168 lb)   Height: 5' 9" (1 753 m)   Oxygen Therapy  SpO2: 93 % (WITH 3 LTS OF OXYGEN)    Wt Readings from Last 3 Encounters:   03/17/22 76 2 kg (168 lb)   03/07/22 76 9 kg (169 lb 9 6 oz)   07/12/21 76 2 kg (168 lb)     Body mass index is 24 81 kg/m²  Physical Exam  Vitals and nursing note reviewed  Constitutional:       Appearance: He is well-developed  HENT:      Head: Normocephalic and atraumatic  Eyes:      Conjunctiva/sclera: Conjunctivae normal       Pupils: Pupils are equal, round, and reactive to light  Neck:      Thyroid: No thyromegaly  Vascular: No JVD  Cardiovascular:      Rate and Rhythm: Normal rate and regular rhythm  Heart sounds: Normal heart sounds  No murmur heard  No friction rub  No gallop  Pulmonary:      Effort: Pulmonary effort is normal  No respiratory distress  Breath sounds: Normal breath sounds  No wheezing or rales  Chest:      Chest wall: No tenderness  Musculoskeletal:         General: No tenderness or deformity  Normal range of motion  Cervical back: Normal range of motion and neck supple  Lymphadenopathy:      Cervical: No cervical adenopathy  Skin:     General: Skin is warm and dry  Neurological:      Mental Status: He is alert and oriented to person, place, and time  Diagnostics:  I have personally reviewed pertinent reports

## 2022-03-17 NOTE — PATIENT INSTRUCTIONS
USE ANORO , ONE PUFF DAILY  USE ALBUTEROL 4 TIMES DAILY WITH THE NEBULIZER  DALIRESP ONE TAB DAILY  GET THE CT CHEST DONE,   WILL SEE YOU BACK IN 6 WEEKS

## 2022-03-21 ENCOUNTER — TELEPHONE (OUTPATIENT)
Dept: PULMONOLOGY | Facility: CLINIC | Age: 82
End: 2022-03-21

## 2022-03-21 NOTE — TELEPHONE ENCOUNTER
After discussing with Dr Peter Browning, I contacted Central Scheduling and canceled the regular chest CT  Mr Marilee Galeana is scheduled for a Lung screening on 3/29/22, which was ordered by his PCP

## 2022-03-24 ENCOUNTER — TELEPHONE (OUTPATIENT)
Dept: FAMILY MEDICINE CLINIC | Facility: CLINIC | Age: 82
End: 2022-03-24

## 2022-03-24 NOTE — TELEPHONE ENCOUNTER
T/c from pt's wife -- Pt having trouble breathing (severe at times), coughing and mucus since yesterday, no fever but some body pain  Offered video visit but does not have a smart phone  Wife would like to have him tested for covid  Has been taking day quill and doesn't seem to be helping  Would you like to see him?     Please advise     Call  Jet -- [phone]

## 2022-03-24 NOTE — TELEPHONE ENCOUNTER
Called wife back and she stated pt has improved since we spoke this am and would like to pass on the visit, if needed she said she will take him to the hospital

## 2022-03-29 ENCOUNTER — HOSPITAL ENCOUNTER (OUTPATIENT)
Dept: CT IMAGING | Facility: CLINIC | Age: 82
Discharge: HOME | End: 2022-03-29
Payer: COMMERCIAL

## 2022-03-29 DIAGNOSIS — Z87.891 FORMER SMOKER: ICD-10-CM

## 2022-03-29 PROCEDURE — 71271 CT THORAX LUNG CANCER SCR C-: CPT

## 2022-04-03 DIAGNOSIS — R91.1 LUNG NODULE: Primary | ICD-10-CM

## 2022-05-02 ENCOUNTER — OFFICE VISIT (OUTPATIENT)
Dept: PULMONOLOGY | Facility: CLINIC | Age: 82
End: 2022-05-02
Payer: COMMERCIAL

## 2022-05-02 VITALS
SYSTOLIC BLOOD PRESSURE: 160 MMHG | BODY MASS INDEX: 24.59 KG/M2 | WEIGHT: 166 LBS | DIASTOLIC BLOOD PRESSURE: 84 MMHG | TEMPERATURE: 97.6 F | OXYGEN SATURATION: 93 % | HEIGHT: 69 IN | HEART RATE: 98 BPM

## 2022-05-02 DIAGNOSIS — J43.2 CENTRILOBULAR EMPHYSEMA (HCC): ICD-10-CM

## 2022-05-02 DIAGNOSIS — R06.02 SOB (SHORTNESS OF BREATH): Primary | ICD-10-CM

## 2022-05-02 DIAGNOSIS — R91.1 LUNG NODULE: ICD-10-CM

## 2022-05-02 DIAGNOSIS — J41.0 SIMPLE CHRONIC BRONCHITIS (HCC): ICD-10-CM

## 2022-05-02 DIAGNOSIS — J96.11 CHRONIC HYPOXEMIC RESPIRATORY FAILURE (HCC): ICD-10-CM

## 2022-05-02 DIAGNOSIS — R09.82 POST-NASAL DRIP: ICD-10-CM

## 2022-05-02 DIAGNOSIS — J44.9 STAGE 4 VERY SEVERE COPD BY GOLD CLASSIFICATION (HCC): ICD-10-CM

## 2022-05-02 PROCEDURE — 99214 OFFICE O/P EST MOD 30 MIN: CPT | Performed by: INTERNAL MEDICINE

## 2022-05-02 PROCEDURE — 1036F TOBACCO NON-USER: CPT | Performed by: INTERNAL MEDICINE

## 2022-05-02 PROCEDURE — 1160F RVW MEDS BY RX/DR IN RCRD: CPT | Performed by: INTERNAL MEDICINE

## 2022-05-02 RX ORDER — AZELASTINE HYDROCHLORIDE, FLUTICASONE PROPIONATE 137; 50 UG/1; UG/1
SPRAY, METERED NASAL
Qty: 23 G | Refills: 0 | Status: SHIPPED | OUTPATIENT
Start: 2022-05-02 | End: 2022-06-06

## 2022-05-02 NOTE — PROGRESS NOTES
Assessment/Plan:   Diagnoses and all orders for this visit:    SOB (shortness of breath)  -     Echo complete w/ contrast if indicated; Future    Stage 4 very severe COPD by GOLD classification ([address] )    Chronic hypoxemic respiratory failure (HCC)    Simple chronic bronchitis (HCC)    Post-nasal drip  -     Azelastine-Fluticasone 137-50 MCG/ACT SUSP; One spray into ech nostril twice daily    Centrilobular emphysema (HCC)    Lung nodule  -     CT chest without contrast; Future        Shortness of breath with chronic simple bronchitis severe COPD currently not in exacerbation   Chronic simple bronchitis with extensive smoking history who quit about 7 years ago has greater than 45 pack-year smoking history  PFTs with severe obstructive airflow limitation with FEV1 of 31% and his lung volumes have been decreased to 56% predicted total lung capacity along with DLCO at severely decreased at 30%  6 minute walk test could walk a total distance of 213 m in 6 minutes needing supplemental oxygen 2 L  He will continue with his 2 L of oxygen nasal cannula currently he is using it continuous  CT lung screening CT scan done March 29, 2022, severe lower lobe predominant panlobular emphysema 5 mm left upper lobe lung nodule stable   Would benefit from 6 months follow-up for the lung nodule given his extensive smoking history , he wants to schedule it on his own he states  Also benefit from an echocardiogram, to look for pulmonary hypertension I do not see any echocardiograms either at [facility] or at [facility] where his cardiologist is he states that he did have an echocardiogram and a scan done last visit he was there with his cardiologist about 3-4 months ago I asked him to call his cardiology office and send us the or fax the report  Will follow-up in 3 months or p r n  earlier as needed  Return in about 3 months (around 8/2/2022)    All questions are answered to the patient's satisfaction and understanding  He verbalizes understanding  He is encouraged to call with any further questions or concerns  Portions of the record may have been created with voice recognition software  Occasional wrong word or "sound a like" substitutions may have occurred due to the inherent limitations of voice recognition software  Read the chart carefully and recognize, using context, where substitutions have occurred  Electronically Signed by Marissa Peace MD    ______________________________________________________________________    Chief Complaint:   Chief Complaint   Patient presents with    Follow-up       Patient ID: Ulysses Álvarez is a 80 y o  y o  male has a past medical history of Arthritis, COPD (chronic obstructive pulmonary disease) ([address] ), Cough, Kidney stones, SOB (shortness of breath), and Wheezing  5/2/2022  Patient presents today for follow-up visit  Patient is a very pleasant 79-year-old gentleman, former smoker with greater than 45 pack-year smoking history who quit about 7 years ago, with history of COPD following up at [facility] pulmonology,  He has had 3-4 exacerbations of his COPD in 2020 , and 2 hospital admissions 1 in August and another 1 again in November of 2020,   He does have 2 L of oxygen for use on exertion as well as nocturnal   He does states shortness of breath and dyspnea on exertion which has been gradually worsening   He continues to use all his inhalers and also Erla January, although he did not have any recent ER visit he states his shortness of breath and dyspnea on exertion continues does not feel good when he use the nebulizer so he has stopped using it but he does continue with his other inhalers he states  Review of Systems   Constitutional: Positive for fatigue  HENT: Negative  Eyes: Negative  Respiratory: Positive for shortness of breath  Cardiovascular: Negative  Gastrointestinal: Negative  Endocrine: Negative  Genitourinary: Negative  Musculoskeletal: Negative  Allergic/Immunologic: Negative  Neurological: Negative  Hematological: Negative  Psychiatric/Behavioral: Negative  Smoking history: He reports that he quit smoking about 8 years ago  His smoking use included cigars  He started smoking about 53 years ago  He has a 22 50 pack-year smoking history   He has never used smokeless tobacco     The following portions of the patient's history were reviewed and updated as appropriate: allergies, current medications, past family history, past medical history, past social history, past surgical history and problem list     Immunization History   Administered Date(s) Administered    COVID-19 MODERNA VACC 0 25 ML IM BOOSTER 12/15/2021    COVID-19 MODERNA VACC 0 5 ML IM 01/28/2021, 02/24/2021    INFLUENZA 10/04/2018, 11/30/2020    Influenza Split High Dose Preservative Free IM 10/04/2018, 09/19/2019    Influenza, high dose seasonal 0 7 mL 11/23/2021    Pneumococcal Conjugate 13-Valent 02/10/2017, 09/19/2019    Pneumococcal Polysaccharide PPV23 03/07/2022    Td (adult), Not Adsorbed 11/27/2016     Current Outpatient Medications   Medication Sig Dispense Refill    albuterol (2 5 mg/3 mL) 0 083 % nebulizer solution Take 3 mL (2 5 mg total) by nebulization 4 (four) times a day 1080 mL 3    Anoro Ellipta 62 5-25 MCG/INH inhaler INHALE 1 PUFF BY MOUTH EVERY DAY 60 blister 5    fluticasone (FLONASE) 50 mcg/act nasal spray SPRAY 2 SPRAYS INTO EACH NOSTRIL EVERY DAY 48 mL 1    levalbuterol (XOPENEX) 1 25 mg/0 5 mL nebulizer solution Take 0 5 mL (1 25 mg total) by nebulization every 6 (six) hours as needed for wheezing 30 vial 0    nabumetone (RELAFEN) 500 mg tablet Take 1 tablet (500 mg total) by mouth 2 (two) times a day as needed (Back pain) 60 tablet 5    omeprazole (PriLOSEC) 20 mg delayed release capsule Take 1 capsule (20 mg total) by mouth daily 90 capsule 5    roflumilast (DALIRESP) 250 MCG tablet Take 1 tablet (250 mcg total) by mouth daily 30 tablet 5    rosuvastatin (CRESTOR) 20 MG tablet Take 1 tablet (20 mg total) by mouth daily 90 tablet 5    triamcinolone (KENALOG) 0 1 % cream Apply topically 3 (three) times a day 30 g 5    Ventolin  (90 Base) MCG/ACT inhaler INHALE 2 PUFFS EVERY 4 (FOUR) HOURS AS NEEDED FOR SHORTNESS OF BREATH 18 g 5    Azelastine-Fluticasone 137-50 MCG/ACT SUSP One spray into ech nostril twice daily 23 g 0    betamethasone, augmented, (DIPROLENE) 0 05 % lotion Apply topically daily To scalp (Patient not taking: Reported on 7/12/2021) 60 mL 3     No current facility-administered medications for this visit  Allergies: Codeine, Cortisone, Penicillin g, and Penicillins    Objective:  Vitals:    05/02/22 0948   BP: 160/84   Pulse: 98   Temp: 97 6 °F (36 4 °C)   SpO2: 93%   Weight: 75 3 kg (166 lb)   Height: 5' 9" (1 753 m)   Oxygen Therapy  SpO2: 93 % (wisth 4 lts of oxygen)    Wt Readings from Last 3 Encounters:   05/02/22 75 3 kg (166 lb)   03/17/22 76 2 kg (168 lb)   03/07/22 76 9 kg (169 lb 9 6 oz)     Body mass index is 24 51 kg/m²  Physical Exam  Vitals and nursing note reviewed  Constitutional:       Appearance: He is well-developed  HENT:      Head: Normocephalic and atraumatic  Eyes:      Conjunctiva/sclera: Conjunctivae normal       Pupils: Pupils are equal, round, and reactive to light  Neck:      Thyroid: No thyromegaly  Vascular: No JVD  Cardiovascular:      Rate and Rhythm: Normal rate and regular rhythm  Heart sounds: Normal heart sounds  No murmur heard  No friction rub  No gallop  Pulmonary:      Effort: Pulmonary effort is normal  No respiratory distress  Breath sounds: Normal breath sounds  No wheezing or rales  Chest:      Chest wall: No tenderness  Musculoskeletal:         General: No tenderness or deformity  Normal range of motion  Cervical back: Normal range of motion and neck supple     Lymphadenopathy:      Cervical: No cervical adenopathy  Skin:     General: Skin is warm and dry  Neurological:      Mental Status: He is alert and oriented to person, place, and time               Diagnostics:  I have personally reviewed pertinent films in PACS

## 2022-05-15 DIAGNOSIS — K21.9 GASTROESOPHAGEAL REFLUX DISEASE, UNSPECIFIED WHETHER ESOPHAGITIS PRESENT: ICD-10-CM

## 2022-05-15 RX ORDER — OMEPRAZOLE 20 MG/1
CAPSULE, DELAYED RELEASE ORAL
Qty: 90 CAPSULE | Refills: 5 | Status: SHIPPED | OUTPATIENT
Start: 2022-05-15

## 2022-05-26 ENCOUNTER — TELEPHONE (OUTPATIENT)
Dept: PULMONOLOGY | Facility: CLINIC | Age: 82
End: 2022-05-26

## 2022-05-30 DIAGNOSIS — E78.5 HYPERLIPIDEMIA, UNSPECIFIED HYPERLIPIDEMIA TYPE: ICD-10-CM

## 2022-05-30 RX ORDER — ROSUVASTATIN CALCIUM 20 MG/1
TABLET, COATED ORAL
Qty: 90 TABLET | Refills: 5 | Status: SHIPPED | OUTPATIENT
Start: 2022-05-30

## 2022-06-05 DIAGNOSIS — R09.82 POST-NASAL DRIP: ICD-10-CM

## 2022-06-06 RX ORDER — AZELASTINE HYDROCHLORIDE, FLUTICASONE PROPIONATE 137; 50 UG/1; UG/1
SPRAY, METERED NASAL
Qty: 23 G | Refills: 3 | Status: SHIPPED | OUTPATIENT
Start: 2022-06-06

## 2022-06-13 DIAGNOSIS — J44.9 CHRONIC OBSTRUCTIVE PULMONARY DISEASE, UNSPECIFIED COPD TYPE (HCC): ICD-10-CM

## 2022-07-09 DIAGNOSIS — J41.0 SIMPLE CHRONIC BRONCHITIS (HCC): ICD-10-CM

## 2022-09-15 DIAGNOSIS — M51.9 LUMBAR DISC DISEASE: ICD-10-CM

## 2022-09-17 RX ORDER — NABUMETONE 500 MG/1
500 TABLET, FILM COATED ORAL 2 TIMES DAILY PRN
Qty: 60 TABLET | Refills: 5 | Status: SHIPPED | OUTPATIENT
Start: 2022-09-17

## 2022-10-18 ENCOUNTER — OFFICE VISIT (OUTPATIENT)
Dept: PULMONOLOGY | Facility: CLINIC | Age: 82
End: 2022-10-18
Payer: COMMERCIAL

## 2022-10-18 VITALS
OXYGEN SATURATION: 96 % | SYSTOLIC BLOOD PRESSURE: 139 MMHG | HEIGHT: 69 IN | TEMPERATURE: 97.4 F | HEART RATE: 103 BPM | DIASTOLIC BLOOD PRESSURE: 80 MMHG | RESPIRATION RATE: 18 BRPM | WEIGHT: 165 LBS | BODY MASS INDEX: 24.44 KG/M2

## 2022-10-18 DIAGNOSIS — J96.11 CHRONIC HYPOXEMIC RESPIRATORY FAILURE (HCC): ICD-10-CM

## 2022-10-18 DIAGNOSIS — Z23 NEEDS FLU SHOT: Primary | ICD-10-CM

## 2022-10-18 DIAGNOSIS — J44.9 STAGE 4 VERY SEVERE COPD BY GOLD CLASSIFICATION (HCC): ICD-10-CM

## 2022-10-18 DIAGNOSIS — J41.0 SIMPLE CHRONIC BRONCHITIS (HCC): Primary | ICD-10-CM

## 2022-10-18 DIAGNOSIS — J44.9 CHRONIC OBSTRUCTIVE PULMONARY DISEASE, UNSPECIFIED COPD TYPE (HCC): ICD-10-CM

## 2022-10-18 DIAGNOSIS — R06.02 SOB (SHORTNESS OF BREATH): ICD-10-CM

## 2022-10-18 PROCEDURE — 90662 IIV NO PRSV INCREASED AG IM: CPT

## 2022-10-18 PROCEDURE — G0008 ADMIN INFLUENZA VIRUS VAC: HCPCS

## 2022-10-18 PROCEDURE — 99214 OFFICE O/P EST MOD 30 MIN: CPT | Performed by: INTERNAL MEDICINE

## 2022-10-18 NOTE — PROGRESS NOTES
Assessment/Plan:   Diagnoses and all orders for this visit:    Simple chronic bronchitis ([address] )  -     fluticasone-umeclidinium-vilanterol (Trelegy Ellipta) 200-62 5-25 MCG/INH AEPB inhaler; Inhale 1 puff daily Rinse mouth after use  SOB (shortness of breath)    Chronic obstructive pulmonary disease, unspecified COPD type ([facility])  -     Ventolin  (90 Base) MCG/ACT inhaler; Inhale 2 puffs every 6 (six) hours as needed for wheezing    Chronic hypoxemic respiratory failure (HCC)    Stage 4 very severe COPD by GOLD classification ([facility])  -     roflumilast (DALIRESP) 250 MCG tablet; Take 1 tablet (250 mcg total) by mouth daily      Shortness of breath with chronic simple bronchitis severe COPD currently not in exacerbation   Chronic simple bronchitis with extensive smoking history who quit about 7 years ago has greater than 45 pack-year smoking history  PFTs with severe obstructive airflow limitation with FEV1 of 31% and his lung volumes have been decreased to 56% predicted total lung capacity along with DLCO at severely decreased at 30%  6 minute walk test could walk a total distance of 213 m in 6 minutes needing supplemental oxygen 2 L  He will continue with his 2 L of oxygen nasal cannula currently he is using it continuous     He was on Anoro 1 puff daily he wants to try the Trelegy he states he saw the add on the TV, he does have some allergy to the cortisone but he has tried fluticasone nasal spray as well and the prednisone in the past sent in Trelegy 1 puff daily    Continue with Trelegy 1 puff daily sent for 1 month, he will give us a call and let us know how he is doing to refill it, otherwise he wants to go back onto the Anoro  Rinse mouth after use   Continue with albuterol MDI 2 puffs 4 times daily as needed  Continue with Daliresp 250 mcg daily  CT lung screening CT scan done March 29, 2022, severe lower lobe predominant panlobular emphysema 5 mm left upper lobe lung nodule stable   Would benefit from 6 months follow-up for the lung nodule given his extensive smoking history , he wants to schedule it on his own he states  He is actually due for the CT of the chest again reminded the patient  Also benefit from an echocardiogram, had with his cardiologist and he states that his scanning was good do not have any reports from their office  Will see him back in 6 months or p r n  earlier as needed  Return in about 6 months (around 4/18/2023)  All questions are answered to the patient's satisfaction and understanding  He verbalizes understanding  He is encouraged to call with any further questions or concerns  Portions of the record may have been created with voice recognition software  Occasional wrong word or "sound a like" substitutions may have occurred due to the inherent limitations of voice recognition software  Read the chart carefully and recognize, using context, where substitutions have occurred  Electronically Signed by India Galdamez MD    ______________________________________________________________________    Chief Complaint:   Chief Complaint   Patient presents with   • Follow-up       Patient ID: Taft Primrose is a 80 y o  y o  male has a past medical history of Arthritis, COPD (chronic obstructive pulmonary disease) ([address] ), Cough, Kidney stones, SOB (shortness of breath), and Wheezing  10/18/2022  Patient presents today for follow-up visit    Patient is a very pleasant 72-year-old gentleman, former smoker with greater than 45 pack-year smoking history who quit about 7 years ago, with history of COPD following up at [facility] pulmonology,  He has had 3-4 exacerbations of his COPD in 2020 , and 2 hospital admissions 1 in August and another 1 again in November of 2020,   He does have 2 L of oxygen for use on exertion as well as nocturnal   He does states shortness of breath and dyspnea on exertion which has been gradually worsening   He continues to use all his inhalers and also Yany Magaña, although he did not have any recent ER visit he states his shortness of breath and dyspnea on exertion continues does not feel good when he use the nebulizer so he has stopped using it but he does continue with his other inhalers he states  Review of Systems   Constitutional: Positive for fatigue  HENT: Negative  Eyes: Negative  Respiratory: Positive for shortness of breath  Cardiovascular: Negative  Gastrointestinal: Negative  Endocrine: Negative  Genitourinary: Negative  Musculoskeletal: Negative  Allergic/Immunologic: Negative  Neurological: Negative  Hematological: Negative  Psychiatric/Behavioral: Negative  Smoking history: He reports that he quit smoking about 8 years ago  His smoking use included cigars  He started smoking about 53 years ago  He has a 22 50 pack-year smoking history   He has never used smokeless tobacco     The following portions of the patient's history were reviewed and updated as appropriate: allergies, current medications, past family history, past medical history, past social history, past surgical history and problem list     Immunization History   Administered Date(s) Administered   • COVID-19 MODERNA VACC 0 25 ML IM BOOSTER 12/15/2021   • COVID-19 MODERNA VACC 0 5 ML IM 01/28/2021, 02/24/2021   • INFLUENZA 10/04/2018, 11/30/2020   • Influenza Split High Dose Preservative Free IM 10/04/2018, 09/19/2019   • Influenza, high dose seasonal 0 7 mL 11/23/2021, 10/18/2022   • Pneumococcal Conjugate 13-Valent 02/10/2017, 09/19/2019   • Pneumococcal Polysaccharide PPV23 03/07/2022   • Td (adult), Not Adsorbed 11/27/2016     Current Outpatient Medications   Medication Sig Dispense Refill   • albuterol (2 5 mg/3 mL) 0 083 % nebulizer solution Take 3 mL (2 5 mg total) by nebulization 4 (four) times a day 1080 mL 3   • Azelastine-Fluticasone 137-50 MCG/ACT SUSP INSTILL ONE SPRAY INTO ECH NOSTRIL TWICE DAILY 23 g 3   • fluticasone (FLONASE) 50 mcg/act nasal spray SPRAY 2 SPRAYS INTO EACH NOSTRIL EVERY DAY 48 mL 1   • fluticasone-umeclidinium-vilanterol (Trelegy Ellipta) 200-62 5-25 MCG/INH AEPB inhaler Inhale 1 puff daily Rinse mouth after use  60 blister 0   • levalbuterol (XOPENEX) 1 25 mg/0 5 mL nebulizer solution Take 0 5 mL (1 25 mg total) by nebulization every 6 (six) hours as needed for wheezing 30 vial 0   • nabumetone (RELAFEN) 500 mg tablet TAKE 1 TABLET (500 MG TOTAL) BY MOUTH 2 (TWO) TIMES A DAY AS NEEDED (BACK PAIN) 60 tablet 5   • omeprazole (PriLOSEC) 20 mg delayed release capsule TAKE 1 CAPSULE BY MOUTH EVERY DAY 90 capsule 5   • roflumilast (DALIRESP) 250 MCG tablet Take 1 tablet (250 mcg total) by mouth daily 30 tablet 5   • rosuvastatin (CRESTOR) 20 MG tablet TAKE 1 TABLET BY MOUTH EVERY DAY 90 tablet 5   • triamcinolone (KENALOG) 0 1 % cream Apply topically 3 (three) times a day 30 g 5   • Ventolin  (90 Base) MCG/ACT inhaler Inhale 2 puffs every 6 (six) hours as needed for wheezing 18 g 5   • betamethasone, augmented, (DIPROLENE) 0 05 % lotion Apply topically daily To scalp (Patient not taking: No sig reported) 60 mL 3     No current facility-administered medications for this visit  Allergies: Codeine, Cortisone, Penicillin g, and Penicillins    Objective:  Vitals:    10/18/22 0839 10/18/22 0843   BP: 139/80    BP Location: Left arm    Patient Position: Sitting    Cuff Size: Large    Pulse: 103    Resp: 18    Temp: (!) 97 4 °F (36 3 °C)    TempSrc: Tympanic    SpO2: 96% 96%   Weight: 74 8 kg (165 lb)    Height: 5' 9" (1 753 m)    Oxygen Therapy  SpO2: 96 %  Oxygen Therapy: None (Room air)    Wt Readings from Last 3 Encounters:   10/18/22 74 8 kg (165 lb)   05/02/22 75 3 kg (166 lb)   03/17/22 76 2 kg (168 lb)     Body mass index is 24 37 kg/m²  Physical Exam  Vitals and nursing note reviewed  Constitutional:       Appearance: He is well-developed  HENT:      Head: Normocephalic and atraumatic     Eyes: Conjunctiva/sclera: Conjunctivae normal       Pupils: Pupils are equal, round, and reactive to light  Neck:      Thyroid: No thyromegaly  Vascular: No JVD  Cardiovascular:      Rate and Rhythm: Normal rate and regular rhythm  Heart sounds: Normal heart sounds  No murmur heard  No friction rub  No gallop  Pulmonary:      Effort: Pulmonary effort is normal  No respiratory distress  Breath sounds: Normal breath sounds  No wheezing or rales  Chest:      Chest wall: No tenderness  Musculoskeletal:         General: No tenderness or deformity  Normal range of motion  Cervical back: Normal range of motion and neck supple  Lymphadenopathy:      Cervical: No cervical adenopathy  Skin:     General: Skin is warm and dry  Neurological:      Mental Status: He is alert and oriented to person, place, and time  Diagnostics:  I have personally reviewed pertinent reports

## 2022-11-03 ENCOUNTER — TELEPHONE (OUTPATIENT)
Dept: PULMONOLOGY | Facility: CLINIC | Age: 82
End: 2022-11-03

## 2022-11-03 NOTE — TELEPHONE ENCOUNTER
Tootie Paredes is calling asking if they can receive updated chart notes from our office  They said the last documentation they received was from 2020 where he was on 2L of oxygen for his POC  They spoke with the patient and he stated he is now on 5L  They don't have documentation of this change   Please advise

## 2022-11-14 DIAGNOSIS — J41.0 SIMPLE CHRONIC BRONCHITIS (HCC): ICD-10-CM

## 2022-11-17 RX ORDER — FLUTICASONE FUROATE, UMECLIDINIUM BROMIDE AND VILANTEROL TRIFENATATE 200; 62.5; 25 UG/1; UG/1; UG/1
POWDER RESPIRATORY (INHALATION)
Qty: 60 EACH | Refills: 3 | Status: SHIPPED | OUTPATIENT
Start: 2022-11-17

## 2022-12-19 ENCOUNTER — TELEPHONE (OUTPATIENT)
Dept: PULMONOLOGY | Facility: CLINIC | Age: 82
End: 2022-12-19

## 2022-12-19 DIAGNOSIS — J43.2 CENTRILOBULAR EMPHYSEMA (HCC): Primary | ICD-10-CM

## 2022-12-19 RX ORDER — UMECLIDINIUM BROMIDE AND VILANTEROL TRIFENATATE 62.5; 25 UG/1; UG/1
1 POWDER RESPIRATORY (INHALATION) DAILY
Qty: 60 BLISTER | Refills: 3 | Status: SHIPPED | OUTPATIENT
Start: 2022-12-19 | End: 2023-04-14

## 2022-12-19 NOTE — TELEPHONE ENCOUNTER
Pt called and left a VM stating that he would like to switch back to Anoro instead of the Trelegy   He said the Trelegy is to expensive

## 2022-12-22 ENCOUNTER — VBI (OUTPATIENT)
Dept: ADMINISTRATIVE | Facility: OTHER | Age: 82
End: 2022-12-22

## 2023-01-11 DIAGNOSIS — R09.81 HEAD CONGESTION: ICD-10-CM

## 2023-01-11 RX ORDER — FLUTICASONE PROPIONATE 50 MCG
2 SPRAY, SUSPENSION (ML) NASAL DAILY
Qty: 48 ML | Refills: 1 | Status: SHIPPED | OUTPATIENT
Start: 2023-01-11

## 2023-01-19 ENCOUNTER — OFFICE VISIT (OUTPATIENT)
Dept: FAMILY MEDICINE CLINIC | Facility: CLINIC | Age: 83
End: 2023-01-19

## 2023-01-19 VITALS
HEIGHT: 69 IN | SYSTOLIC BLOOD PRESSURE: 138 MMHG | HEART RATE: 77 BPM | OXYGEN SATURATION: 96 % | WEIGHT: 171 LBS | DIASTOLIC BLOOD PRESSURE: 68 MMHG | BODY MASS INDEX: 25.33 KG/M2 | TEMPERATURE: 97.3 F

## 2023-01-19 DIAGNOSIS — K21.9 GASTROESOPHAGEAL REFLUX DISEASE, UNSPECIFIED WHETHER ESOPHAGITIS PRESENT: ICD-10-CM

## 2023-01-19 DIAGNOSIS — J96.11 CHRONIC HYPOXEMIC RESPIRATORY FAILURE (HCC): ICD-10-CM

## 2023-01-19 DIAGNOSIS — E78.5 HYPERLIPIDEMIA, UNSPECIFIED HYPERLIPIDEMIA TYPE: ICD-10-CM

## 2023-01-19 DIAGNOSIS — Z86.79 S/P AAA REPAIR: ICD-10-CM

## 2023-01-19 DIAGNOSIS — Z98.890 S/P AAA REPAIR: ICD-10-CM

## 2023-01-19 DIAGNOSIS — J43.2 CENTRILOBULAR EMPHYSEMA (HCC): Primary | ICD-10-CM

## 2023-01-19 DIAGNOSIS — N18.31 STAGE 3A CHRONIC KIDNEY DISEASE (HCC): ICD-10-CM

## 2023-01-19 RX ORDER — PREDNISONE 1 MG/1
5 TABLET ORAL DAILY
Qty: 90 TABLET | Refills: 1 | Status: SHIPPED | OUTPATIENT
Start: 2023-01-19

## 2023-01-19 NOTE — PROGRESS NOTES
Name: Cade Lance      : 1940      MRN: [id number]  Encounter Provider: Christy Fernandez MD  Encounter Date: 2023   Encounter department: [place] [address] [address] [facility] [address]     1  Centrilobular emphysema (HCC)  Assessment & Plan:  Continue Anora Ellipta and albuterol as needed    Orders:  -     predniSONE 5 mg tablet; Take 1 tablet (5 mg total) by mouth daily    2  Chronic hypoxemic respiratory failure (HCC)    3  Gastroesophageal reflux disease, unspecified whether esophagitis present  Assessment & Plan:  Continue Prilosec 20 mg daily      4  Stage 3a chronic kidney disease ([facility] )    5  Hyperlipidemia, unspecified hyperlipidemia type  Assessment & Plan:  Continue Crestor 20 mg daily      6  S/P AAA repair    BMI Counseling: Body mass index is 25 25 kg/m²  Follow-up plan was not completed due to elderly patient (74 years old) where weight reduction/weight gain would complicate underlying health condition such as: illness or physical disability  Depression Screening and Follow-up Plan: Patient was screened for depression during today's encounter  They screened negative with a PHQ-2 score of 0  Subjective      Patient comes in because of breathing problems  He has known emphysema and is on continuous oxygen  He recently was started on Daliresp which gave him depression and he stopped taking this  He does not get relief with bronchodilators  He continues on [employer]  Review of Systems   Constitutional: Negative  HENT: Negative  Respiratory: Positive for shortness of breath  Gastrointestinal: Negative          Current Outpatient Medications on File Prior to Visit   Medication Sig   • fluticasone (FLONASE) 50 mcg/act nasal spray 2 sprays into each nostril daily   • nabumetone (RELAFEN) 500 mg tablet TAKE 1 TABLET (500 MG TOTAL) BY MOUTH 2 (TWO) TIMES A DAY AS NEEDED (BACK PAIN)   • omeprazole (PriLOSEC) 20 mg delayed release capsule TAKE 1 CAPSULE BY MOUTH EVERY DAY   • rosuvastatin (CRESTOR) 20 MG tablet TAKE 1 TABLET BY MOUTH EVERY DAY   • umeclidinium-vilanterol (Anoro Ellipta) 62 5-25 mcg/actuation inhaler Inhale 1 puff daily   • Ventolin  (90 Base) MCG/ACT inhaler Inhale 2 puffs every 6 (six) hours as needed for wheezing   • albuterol (2 5 mg/3 mL) 0 083 % nebulizer solution Take 3 mL (2 5 mg total) by nebulization 4 (four) times a day (Patient not taking: Reported on 1/19/2023)   • [DISCONTINUED] Azelastine-Fluticasone 137-50 MCG/ACT SUSP INSTILL ONE SPRAY INTO ECH NOSTRIL TWICE DAILY (Patient not taking: Reported on 1/19/2023)   • [DISCONTINUED] betamethasone, augmented, (DIPROLENE) 0 05 % lotion Apply topically daily To scalp (Patient not taking: Reported on 7/12/2021)   • [DISCONTINUED] levalbuterol (XOPENEX) 1 25 mg/0 5 mL nebulizer solution Take 0 5 mL (1 25 mg total) by nebulization every 6 (six) hours as needed for wheezing (Patient not taking: Reported on 1/19/2023)   • [DISCONTINUED] roflumilast (DALIRESP) 250 MCG tablet Take 1 tablet (250 mcg total) by mouth daily (Patient not taking: Reported on 1/19/2023)   • [DISCONTINUED] triamcinolone (KENALOG) 0 1 % cream Apply topically 3 (three) times a day (Patient not taking: Reported on 1/19/2023)       Objective     /68 (BP Location: Left arm, Patient Position: Sitting, Cuff Size: Adult)   Pulse 77   Temp (!) 97 3 °F (36 3 °C)   Ht 5' 9" (1 753 m)   Wt 77 6 kg (171 lb)   SpO2 96%   BMI 25 25 kg/m²     Physical Exam  Constitutional:       Appearance: Normal appearance  He is well-developed  HENT:      Head: Normocephalic and atraumatic  Right Ear: Tympanic membrane normal    Eyes:      Pupils: Pupils are equal, round, and reactive to light  Cardiovascular:      Rate and Rhythm: Normal rate and regular rhythm  Heart sounds: Normal heart sounds     Pulmonary:      Effort: Pulmonary effort is normal       Comments: Decreased breath sounds  Musculoskeletal:         General: Normal range of motion  Cervical back: Neck supple  Skin:     General: Skin is warm and dry  Neurological:      Mental Status: He is alert and oriented to person, place, and time     Psychiatric:         Mood and Affect: Mood normal          Behavior: Behavior normal        Travis Gonzalez MD

## 2023-02-21 ENCOUNTER — APPOINTMENT (OUTPATIENT)
Dept: LAB | Facility: HOSPITAL | Age: 83
End: 2023-02-21

## 2023-02-21 ENCOUNTER — HOSPITAL ENCOUNTER (OUTPATIENT)
Dept: RADIOLOGY | Facility: HOSPITAL | Age: 83
Discharge: HOME | End: 2023-02-21

## 2023-02-21 ENCOUNTER — TELEPHONE (OUTPATIENT)
Dept: FAMILY MEDICINE CLINIC | Facility: CLINIC | Age: 83
End: 2023-02-21

## 2023-02-21 DIAGNOSIS — Z12.5 PROSTATE CANCER SCREENING: ICD-10-CM

## 2023-02-21 DIAGNOSIS — N20.0 CALCULUS OF KIDNEY: ICD-10-CM

## 2023-02-21 DIAGNOSIS — R73.03 PREDIABETES: ICD-10-CM

## 2023-02-21 DIAGNOSIS — E78.5 HYPERLIPIDEMIA, UNSPECIFIED HYPERLIPIDEMIA TYPE: Primary | ICD-10-CM

## 2023-02-21 LAB — PSA SERPL-MCNC: 1.1 NG/ML (ref 0–4)

## 2023-02-21 NOTE — TELEPHONE ENCOUNTER
T/c from [facility] with out-patient lab - pt was there to do his blood work for an upcoming appt on 3/9 and there aren't any orders in - requesting orders be placed so pt can go back and have them done  Please advise

## 2023-03-02 ENCOUNTER — RA CDI HCC (OUTPATIENT)
Dept: OTHER | Facility: HOSPITAL | Age: 83
End: 2023-03-02

## 2023-03-02 NOTE — PROGRESS NOTES
[facility]  coding opportunities       Chart reviewed, no opportunity found:   [address]        Patients Insurance     Medicare Insurance: [employer]

## 2023-03-09 ENCOUNTER — OFFICE VISIT (OUTPATIENT)
Dept: FAMILY MEDICINE CLINIC | Facility: CLINIC | Age: 83
End: 2023-03-09

## 2023-03-09 VITALS
HEIGHT: 69 IN | HEART RATE: 93 BPM | BODY MASS INDEX: 25.77 KG/M2 | WEIGHT: 174 LBS | TEMPERATURE: 96.5 F | DIASTOLIC BLOOD PRESSURE: 84 MMHG | SYSTOLIC BLOOD PRESSURE: 134 MMHG | OXYGEN SATURATION: 97 %

## 2023-03-09 DIAGNOSIS — J43.2 CENTRILOBULAR EMPHYSEMA (HCC): ICD-10-CM

## 2023-03-09 DIAGNOSIS — J96.11 CHRONIC HYPOXEMIC RESPIRATORY FAILURE (HCC): ICD-10-CM

## 2023-03-09 DIAGNOSIS — K21.9 GASTROESOPHAGEAL REFLUX DISEASE, UNSPECIFIED WHETHER ESOPHAGITIS PRESENT: ICD-10-CM

## 2023-03-09 DIAGNOSIS — E78.5 HYPERLIPIDEMIA, UNSPECIFIED HYPERLIPIDEMIA TYPE: ICD-10-CM

## 2023-03-09 DIAGNOSIS — Z00.00 MEDICARE ANNUAL WELLNESS VISIT, SUBSEQUENT: Primary | ICD-10-CM

## 2023-03-09 DIAGNOSIS — N18.31 STAGE 3A CHRONIC KIDNEY DISEASE (HCC): ICD-10-CM

## 2023-03-09 NOTE — PATIENT INSTRUCTIONS
Medicare Preventive Visit Patient Instructions  Thank you for completing your Welcome to Medicare Visit or Medicare Annual Wellness Visit today  Your next wellness visit will be due in one year (3/9/2024)  The screening/preventive services that you may require over the next 5-10 years are detailed below  Some tests may not apply to you based off risk factors and/or age  Screening tests ordered at today's visit but not completed yet may show as past due  Also, please note that scanned in results may not display below  Preventive Screenings:  Service Recommendations Previous Testing/Comments   Colorectal Cancer Screening  · Colonoscopy    · Fecal Occult Blood Test (FOBT)/Fecal Immunochemical Test (FIT)  · Fecal DNA/Cologuard Test  · Flexible Sigmoidoscopy Age: 39-70 years old   Colonoscopy: every 10 years (May be performed more frequently if at higher risk)  OR  FOBT/FIT: every 1 year  OR  Cologuard: every 3 years  OR  Sigmoidoscopy: every 5 years  Screening may be recommended earlier than age 39 if at higher risk for colorectal cancer  Also, an individualized decision between you and your healthcare provider will decide whether screening between the ages of 74-80 would be appropriate   Colonoscopy: 03/09/2011  FOBT/FIT: Not on file  Cologuard: 03/21/2021  Sigmoidoscopy: Not on file          Prostate Cancer Screening Individualized decision between patient and health care provider in men between ages of 53-78   Medicare will cover every 12 months beginning on the day after your 50th birthday PSA: 1 1 ng/mL     Screening Not Indicated     Hepatitis C Screening Once for adults born between 1945 and 1965  More frequently in patients at high risk for Hepatitis C Hep C Antibody: Not on file        Diabetes Screening 1-2 times per year if you're at risk for diabetes or have pre-diabetes Fasting glucose: 113 mg/dL (2/22/2022)  A1C: 5 9 % (2/22/2022)      Cholesterol Screening Once every 5 years if you don't have a lipid disorder  May order more often based on risk factors  Lipid panel: 02/22/2022  Screening Not Indicated  History Lipid Disorder      Other Preventive Screenings Covered by Medicare:  1  Abdominal Aortic Aneurysm (AAA) Screening: covered once if your at risk  You're considered to be at risk if you have a family history of AAA or a male between the age of 73-68 who smoking at least 100 cigarettes in your lifetime  2  Lung Cancer Screening: covers low dose CT scan once per year if you meet all of the following conditions: (1) Age 50-69; (2) No signs or symptoms of lung cancer; (3) Current smoker or have quit smoking within the last 15 years; (4) You have a tobacco smoking history of at least 20 pack years (packs per day x number of years you smoked); (5) You get a written order from a healthcare provider  3  Glaucoma Screening: covered annually if you're considered high risk: (1) You have diabetes OR (2) Family history of glaucoma OR (3)  aged 48 and older OR (3)  American aged 72 and older  3  Osteoporosis Screening: covered every 2 years if you meet one of the following conditions: (1) Have a vertebral abnormality; (2) On glucocorticoid therapy for more than 3 months; (3) Have primary hyperparathyroidism; (4) On osteoporosis medications and need to assess response to drug therapy  5  HIV Screening: covered annually if you're between the age of 12-76  Also covered annually if you are younger than 13 and older than 72 with risk factors for HIV infection  For pregnant patients, it is covered up to 3 times per pregnancy      Immunizations:  Immunization Recommendations   Influenza Vaccine Annual influenza vaccination during flu season is recommended for all persons aged >= 6 months who do not have contraindications   Pneumococcal Vaccine   * Pneumococcal conjugate vaccine = PCV13 (Prevnar 13), PCV15 (Vaxneuvance), PCV20 (Prevnar 20)  * Pneumococcal polysaccharide vaccine = PPSV23 (Pneumovax) Adults 25-60 years old: 1-3 doses may be recommended based on certain risk factors  Adults 72 years old: 1-2 doses may be recommended based off what pneumonia vaccine you previously received   Hepatitis B Vaccine 3 dose series if at intermediate or high risk (ex: diabetes, end stage renal disease, liver disease)   Tetanus (Td) Vaccine - COST NOT COVERED BY MEDICARE PART B Following completion of primary series, a booster dose should be given every 10 years to maintain immunity against tetanus  Td may also be given as tetanus wound prophylaxis  Tdap Vaccine - COST NOT COVERED BY MEDICARE PART B Recommended at least once for all adults  For pregnant patients, recommended with each pregnancy  Shingles Vaccine (Shingrix) - COST NOT COVERED BY MEDICARE PART B  2 shot series recommended in those aged 48 and above     Health Maintenance Due:      Topic Date Due   • Lung Cancer Screening  03/29/2023     Immunizations Due:      Topic Date Due   • COVID-19 Vaccine (4 - Booster for [place] series) 02/09/2022     Advance Directives   What are advance directives? Advance directives are legal documents that state your wishes and plans for medical care  These plans are made ahead of time in case you lose your ability to make decisions for yourself  Advance directives can apply to any medical decision, such as the treatments you want, and if you want to donate organs  What are the types of advance directives? There are many types of advance directives, and each state has rules about how to use them  You may choose a combination of any of the following:  · Living will: This is a written record of the treatment you want  You can also choose which treatments you do not want, which to limit, and which to stop at a certain time  This includes surgery, medicine, IV fluid, and tube feedings  · Durable power of  for healthcare [place] [facility]):   This is a written record that states who you want to make healthcare choices for you when you are unable to make them for yourself  This person, called a proxy, is usually a family member or a friend  You may choose more than 1 proxy  · Do not resuscitate (DNR) order:  A DNR order is used in case your heart stops beating or you stop breathing  It is a request not to have certain forms of treatment, such as CPR  A DNR order may be included in other types of advance directives  · Medical directive: This covers the care that you want if you are in a coma, near death, or unable to make decisions for yourself  You can list the treatments you want for each condition  Treatment may include pain medicine, surgery, blood transfusions, dialysis, IV or tube feedings, and a ventilator (breathing machine)  · Values history: This document has questions about your views, beliefs, and how you feel and think about life  This information can help others choose the care that you would choose  Why are advance directives important? An advance directive helps you control your care  Although spoken wishes may be used, it is better to have your wishes written down  Spoken wishes can be misunderstood, or not followed  Treatments may be given even if you do not want them  An advance directive may make it easier for your family to make difficult choices about your care  Weight Management   Why it is important to manage your weight:  Being overweight increases your risk of health conditions such as heart disease, high blood pressure, type 2 diabetes, and certain types of cancer  It can also increase your risk for osteoarthritis, sleep apnea, and other respiratory problems  Aim for a slow, steady weight loss  Even a small amount of weight loss can lower your risk of health problems  How to lose weight safely:  A safe and healthy way to lose weight is to eat fewer calories and get regular exercise  You can lose up about 1 pound a week by decreasing the number of calories you eat by 500 calories each day     Healthy meal plan for weight management:  A healthy meal plan includes a variety of foods, contains fewer calories, and helps you stay healthy  A healthy meal plan includes the following:  · Eat whole-grain foods more often  A healthy meal plan should contain fiber  Fiber is the part of grains, fruits, and vegetables that is not broken down by your body  Whole-grain foods are healthy and provide extra fiber in your diet  Some examples of whole-grain foods are whole-wheat breads and pastas, oatmeal, brown rice, and bulgur  · Eat a variety of vegetables every day  Include dark, leafy greens such as spinach, kale, letty greens, and mustard greens  Eat yellow and orange vegetables such as carrots, sweet potatoes, and winter squash  · Eat a variety of fruits every day  Choose fresh or canned fruit (canned in its own juice or light syrup) instead of juice  Fruit juice has very little or no fiber  · Eat low-fat dairy foods  Drink fat-free (skim) milk or 1% milk  Eat fat-free yogurt and low-fat cottage cheese  Try low-fat cheeses such as mozzarella and other reduced-fat cheeses  · Choose meat and other protein foods that are low in fat  Choose beans or other legumes such as split peas or lentils  Choose fish, skinless poultry (chicken or turkey), or lean cuts of red meat (beef or pork)  Before you cook meat or poultry, cut off any visible fat  · Use less fat and oil  Try baking foods instead of frying them  Add less fat, such as margarine, sour cream, regular salad dressing and mayonnaise to foods  Eat fewer high-fat foods  Some examples of high-fat foods include french fries, doughnuts, ice cream, and cakes  · Eat fewer sweets  Limit foods and drinks that are high in sugar  This includes candy, cookies, regular soda, and sweetened drinks  Exercise:  Exercise at least 30 minutes per day on most days of the week  Some examples of exercise include walking, biking, dancing, and swimming   You can also fit in more physical activity by taking the stairs instead of the elevator or parking farther away from stores  Ask your healthcare provider about the best exercise plan for you  © Copyright [employer] 2018 Information is for End User's use only and may not be sold, redistributed or otherwise used for commercial purposes  All illustrations and images included in CareNotes® are the copyrighted property of [employer]  or [employer] [facility] Preventive Visit Patient Instructions  Thank you for completing your Welcome to Medicare Visit or Medicare Annual Wellness Visit today  Your next wellness visit will be due in one year (3/9/2024)  The screening/preventive services that you may require over the next 5-10 years are detailed below  Some tests may not apply to you based off risk factors and/or age  Screening tests ordered at today's visit but not completed yet may show as past due  Also, please note that scanned in results may not display below  Preventive Screenings:  Service Recommendations Previous Testing/Comments   Colorectal Cancer Screening  · Colonoscopy    · Fecal Occult Blood Test (FOBT)/Fecal Immunochemical Test (FIT)  · Fecal DNA/Cologuard Test  · Flexible Sigmoidoscopy Age: 39-70 years old   Colonoscopy: every 10 years (May be performed more frequently if at higher risk)  OR  FOBT/FIT: every 1 year  OR  Cologuard: every 3 years  OR  Sigmoidoscopy: every 5 years  Screening may be recommended earlier than age 39 if at higher risk for colorectal cancer  Also, an individualized decision between you and your healthcare provider will decide whether screening between the ages of 74-80 would be appropriate   Colonoscopy: 03/09/2011  FOBT/FIT: Not on file  Cologuard: 03/21/2021  Sigmoidoscopy: Not on file          Prostate Cancer Screening Individualized decision between patient and health care provider in men between ages of 53-78   Medicare will cover every 12 months beginning on the day after your 50th birthday PSA: 1 1 ng/mL     Screening Not Indicated     Hepatitis C Screening Once for adults born between 1945 and 1965  More frequently in patients at high risk for Hepatitis C Hep C Antibody: Not on file        Diabetes Screening 1-2 times per year if you're at risk for diabetes or have pre-diabetes Fasting glucose: 113 mg/dL (2/22/2022)  A1C: 5 9 % (2/22/2022)      Cholesterol Screening Once every 5 years if you don't have a lipid disorder  May order more often based on risk factors  Lipid panel: 02/22/2022  Screening Not Indicated  History Lipid Disorder      Other Preventive Screenings Covered by Medicare:  6  Abdominal Aortic Aneurysm (AAA) Screening: covered once if your at risk  You're considered to be at risk if you have a family history of AAA or a male between the age of 73-68 who smoking at least 100 cigarettes in your lifetime  7  Lung Cancer Screening: covers low dose CT scan once per year if you meet all of the following conditions: (1) Age 50-69; (2) No signs or symptoms of lung cancer; (3) Current smoker or have quit smoking within the last 15 years; (4) You have a tobacco smoking history of at least 20 pack years (packs per day x number of years you smoked); (5) You get a written order from a healthcare provider  8  Glaucoma Screening: covered annually if you're considered high risk: (1) You have diabetes OR (2) Family history of glaucoma OR (3)  aged 48 and older OR (3)  American aged 72 and older  5  Osteoporosis Screening: covered every 2 years if you meet one of the following conditions: (1) Have a vertebral abnormality; (2) On glucocorticoid therapy for more than 3 months; (3) Have primary hyperparathyroidism; (4) On osteoporosis medications and need to assess response to drug therapy  10  HIV Screening: covered annually if you're between the age of 12-76  Also covered annually if you are younger than 13 and older than 72 with risk factors for HIV infection   For pregnant patients, it is covered up to 3 times per pregnancy  Immunizations:  Immunization Recommendations   Influenza Vaccine Annual influenza vaccination during flu season is recommended for all persons aged >= 6 months who do not have contraindications   Pneumococcal Vaccine   * Pneumococcal conjugate vaccine = PCV13 (Prevnar 13), PCV15 (Vaxneuvance), PCV20 (Prevnar 20)  * Pneumococcal polysaccharide vaccine = PPSV23 (Pneumovax) Adults 25-60 years old: 1-3 doses may be recommended based on certain risk factors  Adults 72 years old: 1-2 doses may be recommended based off what pneumonia vaccine you previously received   Hepatitis B Vaccine 3 dose series if at intermediate or high risk (ex: diabetes, end stage renal disease, liver disease)   Tetanus (Td) Vaccine - COST NOT COVERED BY MEDICARE PART B Following completion of primary series, a booster dose should be given every 10 years to maintain immunity against tetanus  Td may also be given as tetanus wound prophylaxis  Tdap Vaccine - COST NOT COVERED BY MEDICARE PART B Recommended at least once for all adults  For pregnant patients, recommended with each pregnancy  Shingles Vaccine (Shingrix) - COST NOT COVERED BY MEDICARE PART B  2 shot series recommended in those aged 48 and above     Health Maintenance Due:      Topic Date Due   • Lung Cancer Screening  03/29/2023     Immunizations Due:      Topic Date Due   • COVID-19 Vaccine (4 - Booster for [place] series) 02/09/2022     Advance Directives   What are advance directives? Advance directives are legal documents that state your wishes and plans for medical care  These plans are made ahead of time in case you lose your ability to make decisions for yourself  Advance directives can apply to any medical decision, such as the treatments you want, and if you want to donate organs  What are the types of advance directives? There are many types of advance directives, and each state has rules about how to use them  You may choose a combination of any of the following:  · Living will: This is a written record of the treatment you want  You can also choose which treatments you do not want, which to limit, and which to stop at a certain time  This includes surgery, medicine, IV fluid, and tube feedings  · Durable power of  for healthcare [place] [facility]): This is a written record that states who you want to make healthcare choices for you when you are unable to make them for yourself  This person, called a proxy, is usually a family member or a friend  You may choose more than 1 proxy  · Do not resuscitate (DNR) order:  A DNR order is used in case your heart stops beating or you stop breathing  It is a request not to have certain forms of treatment, such as CPR  A DNR order may be included in other types of advance directives  · Medical directive: This covers the care that you want if you are in a coma, near death, or unable to make decisions for yourself  You can list the treatments you want for each condition  Treatment may include pain medicine, surgery, blood transfusions, dialysis, IV or tube feedings, and a ventilator (breathing machine)  · Values history: This document has questions about your views, beliefs, and how you feel and think about life  This information can help others choose the care that you would choose  Why are advance directives important? An advance directive helps you control your care  Although spoken wishes may be used, it is better to have your wishes written down  Spoken wishes can be misunderstood, or not followed  Treatments may be given even if you do not want them  An advance directive may make it easier for your family to make difficult choices about your care  Weight Management   Why it is important to manage your weight:  Being overweight increases your risk of health conditions such as heart disease, high blood pressure, type 2 diabetes, and certain types of cancer   It can also increase your risk for osteoarthritis, sleep apnea, and other respiratory problems  Aim for a slow, steady weight loss  Even a small amount of weight loss can lower your risk of health problems  How to lose weight safely:  A safe and healthy way to lose weight is to eat fewer calories and get regular exercise  You can lose up about 1 pound a week by decreasing the number of calories you eat by 500 calories each day  Healthy meal plan for weight management:  A healthy meal plan includes a variety of foods, contains fewer calories, and helps you stay healthy  A healthy meal plan includes the following:  · Eat whole-grain foods more often  A healthy meal plan should contain fiber  Fiber is the part of grains, fruits, and vegetables that is not broken down by your body  Whole-grain foods are healthy and provide extra fiber in your diet  Some examples of whole-grain foods are whole-wheat breads and pastas, oatmeal, brown rice, and bulgur  · Eat a variety of vegetables every day  Include dark, leafy greens such as spinach, kale, letty greens, and mustard greens  Eat yellow and orange vegetables such as carrots, sweet potatoes, and winter squash  · Eat a variety of fruits every day  Choose fresh or canned fruit (canned in its own juice or light syrup) instead of juice  Fruit juice has very little or no fiber  · Eat low-fat dairy foods  Drink fat-free (skim) milk or 1% milk  Eat fat-free yogurt and low-fat cottage cheese  Try low-fat cheeses such as mozzarella and other reduced-fat cheeses  · Choose meat and other protein foods that are low in fat  Choose beans or other legumes such as split peas or lentils  Choose fish, skinless poultry (chicken or turkey), or lean cuts of red meat (beef or pork)  Before you cook meat or poultry, cut off any visible fat  · Use less fat and oil  Try baking foods instead of frying them   Add less fat, such as margarine, sour cream, regular salad dressing and mayonnaise to foods  Eat fewer high-fat foods  Some examples of high-fat foods include french fries, doughnuts, ice cream, and cakes  · Eat fewer sweets  Limit foods and drinks that are high in sugar  This includes candy, cookies, regular soda, and sweetened drinks  Exercise:  Exercise at least 30 minutes per day on most days of the week  Some examples of exercise include walking, biking, dancing, and swimming  You can also fit in more physical activity by taking the stairs instead of the elevator or parking farther away from stores  Ask your healthcare provider about the best exercise plan for you  © Copyright [address] 2018 Information is for End User's use only and may not be sold, redistributed or otherwise used for commercial purposes   All illustrations and images included in CareNotes® are the copyrighted property of [employer]  or [address] [place] [address]

## 2023-03-09 NOTE — PROGRESS NOTES
Assessment and Plan:   Return visit in 6 months  He is to do blood work done as previously prescribed and we will call with results  Problem List Items Addressed This Visit        Digestive    Gastroesophageal reflux disease     Continue Prilosec 20 mg daily            Respiratory    Centrilobular emphysema (HCC)     Continue prednisone 5 mg daily, Anoro Ellipta and albuterol  Follow-up with pulmonology next month         Chronic hypoxemic respiratory failure (HCC)       Genitourinary    Stage 3a chronic kidney disease ([facility] [address] )       Other    Hyperlipidemia     Continue Crestor 20 mg daily        Other Visit Diagnoses     Medicare annual wellness visit, subsequent    -  Primary        BMI Counseling: Body mass index is 25 7 kg/m²  Follow-up plan was not completed due to elderly patient (74 years old) where weight reduction/weight gain would complicate underlying health condition such as: illness or physical disability  Depression Screening and Follow-up Plan: Patient was screened for depression during today's encounter  They screened negative with a PHQ-2 score of 0  Preventive health issues were discussed with patient, and age appropriate screening tests were ordered as noted in patient's After Visit Summary  Personalized health advice and appropriate referrals for health education or preventive services given if needed, as noted in patient's After Visit Summary  History of Present Illness:     Patient presents for a Medicare Wellness Visit    Patient comes in for checkup  He feels his breathing is doing a bit better since starting prednisone 5 mg daily  He is not having any side effects  He continues to use home oxygen continuously  Patient Care Team:  Maddie Hodge MD as PCP - General  Jet Alonzo PA-C as Physician Assistant (Physician Assistant)  Victor Hugo Bailey PA-C (Gastroenterology)     Review of Systems:     Review of Systems   Constitutional: Negative      Respiratory: Positive for shortness of breath  Cardiovascular: Negative  Gastrointestinal: Negative           Problem List:     Patient Active Problem List   Diagnosis   • Hyperlipidemia   • Lumbar disc disease   • Seborrhea   • Actinic keratosis   • Psoriasis   • Former smoker   • Kidney stones   • Prediabetes   • Centrilobular emphysema ([facility] [address] )   • Gastroesophageal reflux disease   • Chronic hypoxemic respiratory failure (HCC)   • Stage 3a chronic kidney disease (HCC)   • S/P AAA repair      Past Medical and Surgical History:     Past Medical History:   Diagnosis Date   • Arthritis    • COPD (chronic obstructive pulmonary disease) ([facility] [address] )    • Cough    • Kidney stones    • SOB (shortness of breath)    • Wheezing      Past Surgical History:   Procedure Laterality Date   • ABDOMINAL AORTIC ANEURYSM REPAIR      ENDOVASC REPAIR AAA PLACE VISCERAL EXTENS PROSTH    LAST ASSESSED [id number]   • COLONOSCOPY  2011   • HAND SURGERY      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       Family History:     Family History   Problem Relation Age of Onset   • Heart failure Mother    • Diabetes Mother    • Other Mother         MENTAL DISORDER       Social History:     Social History     Socioeconomic History   • Marital status: /Civil Union     Spouse name: None   • Number of children: None   • Years of education: None   • Highest education level: None   Occupational History   • Occupation: RETIRED    Tobacco Use   • Smoking status: Former     Packs/day: 0 50     Years: 45 00     Pack years: 22 50     Types: Cigars, Cigarettes     Start date:      Quit date:      Years since quittin 1   • Smokeless tobacco: Never   Vaping Use   • Vaping Use: Never used   Substance and Sexual Activity   • Alcohol use: Yes     Comment: OCCASIONAL    • Drug use: No   • Sexual activity: None   Other Topics Concern   • None   Social History Narrative    ALWAYS USES [employer]    LACK OF PHYSICAL EXERCISE    LIVES WITH SPOUSE      Social Determinants of Health     Financial Resource Strain: Low Risk    • Difficulty of Paying Living Expenses: Not hard at all   Food Insecurity: Not on file   Transportation Needs: No Transportation Needs   • Lack of Transportation (Medical): No   • Lack of Transportation (Non-Medical): No   Physical Activity: Not on file   Stress: Not on file   Social Connections: Not on file   Intimate Partner Violence: Not on file   Housing Stability: Not on file      Medications and Allergies:     Current Outpatient Medications   Medication Sig Dispense Refill   • albuterol (2 5 mg/3 mL) 0 083 % nebulizer solution Take 3 mL (2 5 mg total) by nebulization 4 (four) times a day 1080 mL 3   • fluticasone (FLONASE) 50 mcg/act nasal spray 2 sprays into each nostril daily 48 mL 1   • nabumetone (RELAFEN) 500 mg tablet TAKE 1 TABLET (500 MG TOTAL) BY MOUTH 2 (TWO) TIMES A DAY AS NEEDED (BACK PAIN) 60 tablet 5   • omeprazole (PriLOSEC) 20 mg delayed release capsule TAKE 1 CAPSULE BY MOUTH EVERY DAY 90 capsule 5   • predniSONE 5 mg tablet Take 1 tablet (5 mg total) by mouth daily 90 tablet 1   • rosuvastatin (CRESTOR) 20 MG tablet TAKE 1 TABLET BY MOUTH EVERY DAY 90 tablet 5   • umeclidinium-vilanterol (Anoro Ellipta) 62 5-25 mcg/actuation inhaler Inhale 1 puff daily 60 blister 3   • Ventolin  (90 Base) MCG/ACT inhaler Inhale 2 puffs every 6 (six) hours as needed for wheezing 18 g 5     No current facility-administered medications for this visit       Allergies   Allergen Reactions   • Codeine Nausea Only   • Cortisone      Other reaction(s): Unknown   • Penicillin G      Other reaction(s): Unknown   • Penicillins       Immunizations:     Immunization History   Administered Date(s) Administered   • COVID-19 MODERNA VACC 0 25 ML IM BOOSTER 12/15/2021   • COVID-19 MODERNA VACC 0 5 ML IM 01/28/2021, 02/24/2021, 12/15/2021   • INFLUENZA 10/04/2018, 11/30/2020, 10/18/2022   • Influenza Split High Dose Preservative Free IM 10/04/2018, 09/19/2019   • Influenza, high dose seasonal 0 7 mL 11/23/2021, 10/18/2022   • Pneumococcal Conjugate 13-Valent 02/10/2017, 09/19/2019   • Pneumococcal Polysaccharide PPV23 03/07/2022   • Td (adult), Not Adsorbed 11/27/2016      Health Maintenance:         Topic Date Due   • Lung Cancer Screening  03/29/2023         Topic Date Due   • COVID-19 Vaccine (4 - Booster for Moderna series) 02/09/2022      Medicare Screening Tests and Risk Assessments:     Omar Hughes is here for his Subsequent Wellness visit  Last Medicare Wellness visit information reviewed, patient interviewed and updates made to the record today  Health Risk Assessment:   Patient rates overall health as good  Patient feels that their physical health rating is same  Patient is satisfied with their life  Eyesight was rated as same  Hearing was rated as same  Patient feels that their emotional and mental health rating is same  Patients states they are never, rarely angry  Patient states they are sometimes unusually tired/fatigued  Pain experienced in the last 7 days has been none  Patient states that he has experienced no weight loss or gain in last 6 months  Depression Screening:   PHQ-2 Score: 0      Fall Risk Screening: In the past year, patient has experienced: no history of falling in past year      Home Safety:  Patient does not have trouble with stairs inside or outside of their home  Patient has working smoke alarms and has working carbon monoxide detector  Home safety hazards include: none  Nutrition:   Current diet is Regular  Medications:   Patient is not currently taking any over-the-counter supplements  Patient is able to manage medications  Activities of Daily Living (ADLs)/Instrumental Activities of Daily Living (IADLs):   Walk and transfer into and out of bed and chair?: Yes  Dress and groom yourself?: Yes    Bathe or shower yourself?: Yes    Feed yourself?  Yes  Do your laundry/housekeeping?: Yes  Manage your money, pay your bills and track your expenses?: Yes  Make your own meals?: No    Do your own shopping?: Yes    Previous Hospitalizations:   Any hospitalizations or ED visits within the last 12 months?: No      Advance Care Planning:   Living will: No    Durable POA for healthcare: No    Advanced directive counseling given: Yes    Five wishes given: Yes    End of Life Decisions reviewed with patient: Yes    Provider agrees with end of life decisions: Yes      PREVENTIVE SCREENINGS      Cardiovascular Screening:    General: Screening Not Indicated, History Lipid Disorder and Risks and Benefits Discussed    Due for: Lipid Panel      Diabetes Screening:     General: Risks and Benefits Discussed    Due for: Blood Glucose      Colorectal Cancer Screening:     General: Screening Not Indicated      Prostate Cancer Screening:    General: Screening Not Indicated      Osteoporosis Screening:    General: Screening Not Indicated      Abdominal Aortic Aneurysm (AAA) Screening:    Risk factors include: tobacco use        General: Screening Not Indicated and History AAA      Lung Cancer Screening:     General: Screening Not Indicated      Hepatitis C Screening:    General: Screening Current    Screening, Brief Intervention, and Referral to Treatment (SBIRT)    Screening  Typical number of drinks in a day: 1    Single Item Drug Screening:  How often have you used an illegal drug (including marijuana) or a prescription medication for non-medical reasons in the past year? never    Single Item Drug Screen Score: 0  Interpretation: Negative screen for possible drug use disorder    Brief Intervention  Alcohol & drug use screenings were reviewed  No concerns regarding substance use disorder identified  No results found  Physical Exam:     /84   Pulse 93   Temp (!) 96 5 °F (35 8 °C)   Ht 5' 9" (1 753 m)   Wt 78 9 kg (174 lb)   SpO2 97%   BMI 25 70 kg/m²     Physical Exam  Vitals and nursing note reviewed     Constitutional: Appearance: Normal appearance  He is well-developed  HENT:      Head: Normocephalic and atraumatic  Eyes:      Conjunctiva/sclera: Conjunctivae normal    Cardiovascular:      Rate and Rhythm: Normal rate and regular rhythm  Heart sounds: Normal heart sounds  Pulmonary:      Effort: Pulmonary effort is normal       Breath sounds: Normal breath sounds  Musculoskeletal:         General: No swelling  Cervical back: Neck supple  Skin:     General: Skin is warm and dry  Capillary Refill: Capillary refill takes less than 2 seconds  Neurological:      Mental Status: He is alert     Psychiatric:         Mood and Affect: Mood normal          Behavior: Behavior normal           Christy Fernandez MD

## 2023-03-09 NOTE — ASSESSMENT & PLAN NOTE
Continue prednisone 5 mg daily, Anoro Ellipta and albuterol    Follow-up with pulmonology next month Advancement Flap (Single) Text: The defect edges were debeveled with a #15 scalpel blade.  Given the location of the defect and the proximity to free margins a single advancement flap was deemed most appropriate.  Using a sterile surgical marker, an appropriate advancement flap was drawn incorporating the defect and placing the expected incisions within the relaxed skin tension lines where possible.    The area thus outlined was incised deep to adipose tissue with a #15 scalpel blade.  The skin margins were undermined to an appropriate distance in all directions utilizing iris scissors.

## 2023-04-29 DIAGNOSIS — J43.2 CENTRILOBULAR EMPHYSEMA (HCC): ICD-10-CM

## 2023-04-29 DIAGNOSIS — R09.81 HEAD CONGESTION: ICD-10-CM

## 2023-04-30 RX ORDER — PREDNISONE 5 MG/1
5 TABLET ORAL DAILY
Qty: 90 TABLET | Refills: 1 | Status: SHIPPED | OUTPATIENT
Start: 2023-04-30

## 2023-04-30 RX ORDER — FLUTICASONE PROPIONATE 50 MCG
SPRAY, SUSPENSION (ML) NASAL
Qty: 48 ML | Refills: 1 | Status: SHIPPED | OUTPATIENT
Start: 2023-04-30

## 2023-05-04 DIAGNOSIS — M51.9 LUMBAR DISC DISEASE: ICD-10-CM

## 2023-05-04 RX ORDER — NABUMETONE 500 MG/1
500 TABLET, FILM COATED ORAL 2 TIMES DAILY PRN
Qty: 60 TABLET | Refills: 5 | Status: SHIPPED | OUTPATIENT
Start: 2023-05-04

## 2023-05-04 NOTE — TELEPHONE ENCOUNTER
Requested medication(s) are due for refill today: Yes  Patient has already received a courtesy refill: No  Other reason request has been forwarded to provider:    HGB in normal range and within 360 days    eGFR is 60 or above and within 360 days Spoke with Sunni, she reports they received a call a while ago from a foreign pedro, telling them he qualifies for [employer] diabetic supplies. He received those, wife is asking how to use?  She is willing for him to see Diabetic educator, I have this prepped below for Dr Mcnair to sign ASAP.

## 2023-07-28 DIAGNOSIS — E78.5 HYPERLIPIDEMIA, UNSPECIFIED HYPERLIPIDEMIA TYPE: ICD-10-CM

## 2023-07-30 RX ORDER — ROSUVASTATIN CALCIUM 20 MG/1
TABLET, COATED ORAL
Qty: 90 TABLET | Refills: 0 | Status: SHIPPED | OUTPATIENT
Start: 2023-07-30

## 2023-08-07 DIAGNOSIS — K21.9 GASTROESOPHAGEAL REFLUX DISEASE, UNSPECIFIED WHETHER ESOPHAGITIS PRESENT: ICD-10-CM

## 2023-08-07 NOTE — TELEPHONE ENCOUNTER
T/c from pt - Pt was mistaken on 04.18.2023 stating he is not taking omeprazole longer - pt takes meds as rx'd -  - also requesting a refill of it - Takes 1 [address] [employer]. Unrelated to this matter :     Also pt is c/o his breathing issues, states it worsens with the humidity - takes 5 mg of prednisone daily - pt is wondering if he increase the dosage of prednisone.        [facility] [address] [facility] [place], [address]        Please advise

## 2023-08-12 RX ORDER — OMEPRAZOLE 20 MG/1
20 CAPSULE, DELAYED RELEASE ORAL DAILY
Qty: 90 CAPSULE | Refills: 5 | Status: SHIPPED | OUTPATIENT
Start: 2023-08-12

## 2023-08-15 NOTE — TELEPHONE ENCOUNTER
Tc from pt -- states he has called for the last 3 weeks. Pt is wondering if he is able to double up on his prednisone because it helps his breathing.      Please advise

## 2023-08-25 ENCOUNTER — HOSPITAL ENCOUNTER (EMERGENCY)
Facility: HOSPITAL | Age: 83
Discharge: HOME | End: 2023-08-25
Attending: EMERGENCY MEDICINE
Payer: COMMERCIAL

## 2023-08-25 VITALS
SYSTOLIC BLOOD PRESSURE: 137 MMHG | HEART RATE: 91 BPM | OXYGEN SATURATION: 98 % | BODY MASS INDEX: 25.3 KG/M2 | RESPIRATION RATE: 20 BRPM | WEIGHT: 171.3 LBS | DIASTOLIC BLOOD PRESSURE: 94 MMHG | TEMPERATURE: 98 F

## 2023-08-25 DIAGNOSIS — M70.20 OLECRANON BURSITIS: Primary | ICD-10-CM

## 2023-08-25 DIAGNOSIS — L03.90 CELLULITIS: ICD-10-CM

## 2023-08-25 LAB
ALBUMIN SERPL BCP-MCNC: 4.2 G/DL (ref 3.5–5)
ALP SERPL-CCNC: 68 U/L (ref 34–104)
ALT SERPL W P-5'-P-CCNC: 43 U/L (ref 7–52)
ANION GAP SERPL CALCULATED.3IONS-SCNC: 7 MMOL/L
APPEARANCE FLD: ABNORMAL
APTT PPP: 30 SECONDS (ref 23–37)
AST SERPL W P-5'-P-CCNC: 33 U/L (ref 13–39)
BASOPHILS # BLD AUTO: 0.02 THOUSANDS/ÂΜL (ref 0–0.1)
BASOPHILS NFR BLD AUTO: 0 % (ref 0–1)
BILIRUB SERPL-MCNC: 0.57 MG/DL (ref 0.2–1)
BUN SERPL-MCNC: 14 MG/DL (ref 5–25)
CALCIUM SERPL-MCNC: 9.7 MG/DL (ref 8.4–10.2)
CHLORIDE SERPL-SCNC: 102 MMOL/L (ref 96–108)
CO2 SERPL-SCNC: 32 MMOL/L (ref 21–32)
COLOR FLD: ABNORMAL
CREAT SERPL-MCNC: 1.26 MG/DL (ref 0.6–1.3)
CRP SERPL QL: 38.1 MG/L
CRYSTALS SNV QL MICRO: NORMAL
EOSINOPHIL # BLD AUTO: 0.06 THOUSAND/ÂΜL (ref 0–0.61)
EOSINOPHIL NFR BLD AUTO: 1 % (ref 0–6)
ERYTHROCYTE [DISTWIDTH] IN BLOOD BY AUTOMATED COUNT: 12.5 % (ref 11.6–15.1)
ERYTHROCYTE [SEDIMENTATION RATE] IN BLOOD: 18 MM/HOUR (ref 0–19)
GFR SERPL CREATININE-BSD FRML MDRD: 53 ML/MIN/1.73SQ M
GLUCOSE SERPL-MCNC: 140 MG/DL (ref 65–140)
GRAM STN SPEC: NORMAL
GRAM STN SPEC: NORMAL
HCT VFR BLD AUTO: 48.9 % (ref 36.5–49.3)
HGB BLD-MCNC: 16.3 G/DL (ref 12–17)
IMM GRANULOCYTES # BLD AUTO: 0.04 THOUSAND/UL (ref 0–0.2)
IMM GRANULOCYTES NFR BLD AUTO: 0 % (ref 0–2)
INR PPP: 1 (ref 0.84–1.19)
LACTATE SERPL-SCNC: 0.7 MMOL/L (ref 0.5–2)
LYMPHOCYTES # BLD AUTO: 0.42 THOUSANDS/ÂΜL (ref 0.6–4.47)
LYMPHOCYTES NFR BLD AUTO: 4 % (ref 14–44)
MCH RBC QN AUTO: 31.9 PG (ref 26.8–34.3)
MCHC RBC AUTO-ENTMCNC: 33.3 G/DL (ref 31.4–37.4)
MCV RBC AUTO: 96 FL (ref 82–98)
MONOCYTES # BLD AUTO: 0.7 THOUSAND/ÂΜL (ref 0.17–1.22)
MONOCYTES NFR BLD AUTO: 7 % (ref 4–12)
NEUTROPHILS # BLD AUTO: 8.71 THOUSANDS/ÂΜL (ref 1.85–7.62)
NEUTROPHILS NFR SNV MANUAL: 100 %
NEUTS SEG NFR BLD AUTO: 88 % (ref 43–75)
NRBC BLD AUTO-RTO: 0 /100 WBCS
PLATELET # BLD AUTO: 159 THOUSANDS/UL (ref 149–390)
PMV BLD AUTO: 11.2 FL (ref 8.9–12.7)
POTASSIUM SERPL-SCNC: 4.3 MMOL/L (ref 3.5–5.3)
PROCALCITONIN SERPL-MCNC: 0.06 NG/ML
PROT SERPL-MCNC: 7.5 G/DL (ref 6.4–8.4)
PROTHROMBIN TIME: 13.8 SECONDS (ref 11.6–14.5)
RBC # BLD AUTO: 5.11 MILLION/UL (ref 3.88–5.62)
RBC # SNV MANUAL: ABNORMAL /UL (ref 0–10)
SITE: ABNORMAL
SODIUM SERPL-SCNC: 141 MMOL/L (ref 135–147)
TOTAL CELLS COUNTED SPEC: 100
WBC # BLD AUTO: 9.95 THOUSAND/UL (ref 4.31–10.16)
WBC # FLD MANUAL: ABNORMAL /UL (ref 0–200)

## 2023-08-25 PROCEDURE — 87040 BLOOD CULTURE FOR BACTERIA: CPT

## 2023-08-25 PROCEDURE — 87147 CULTURE TYPE IMMUNOLOGIC: CPT

## 2023-08-25 PROCEDURE — 20610 DRAIN/INJ JOINT/BURSA W/O US: CPT

## 2023-08-25 PROCEDURE — 83605 ASSAY OF LACTIC ACID: CPT

## 2023-08-25 PROCEDURE — 99283 EMERGENCY DEPT VISIT LOW MDM: CPT

## 2023-08-25 PROCEDURE — 89050 BODY FLUID CELL COUNT: CPT

## 2023-08-25 PROCEDURE — 89060 EXAM SYNOVIAL FLUID CRYSTALS: CPT

## 2023-08-25 PROCEDURE — 89051 BODY FLUID CELL COUNT: CPT

## 2023-08-25 PROCEDURE — 99284 EMERGENCY DEPT VISIT MOD MDM: CPT

## 2023-08-25 PROCEDURE — 85730 THROMBOPLASTIN TIME PARTIAL: CPT

## 2023-08-25 PROCEDURE — 84145 PROCALCITONIN (PCT): CPT

## 2023-08-25 PROCEDURE — 87070 CULTURE OTHR SPECIMN AEROBIC: CPT

## 2023-08-25 PROCEDURE — 85610 PROTHROMBIN TIME: CPT

## 2023-08-25 PROCEDURE — 86140 C-REACTIVE PROTEIN: CPT

## 2023-08-25 PROCEDURE — 96365 THER/PROPH/DIAG IV INF INIT: CPT

## 2023-08-25 PROCEDURE — 36415 COLL VENOUS BLD VENIPUNCTURE: CPT

## 2023-08-25 PROCEDURE — 80053 COMPREHEN METABOLIC PANEL: CPT

## 2023-08-25 PROCEDURE — 87205 SMEAR GRAM STAIN: CPT

## 2023-08-25 PROCEDURE — 87186 SC STD MICRODIL/AGAR DIL: CPT

## 2023-08-25 PROCEDURE — 85025 COMPLETE CBC W/AUTO DIFF WBC: CPT

## 2023-08-25 PROCEDURE — 85652 RBC SED RATE AUTOMATED: CPT

## 2023-08-25 RX ORDER — CEFTRIAXONE 2 G/50ML
2000 INJECTION, SOLUTION INTRAVENOUS ONCE
Status: COMPLETED | OUTPATIENT
Start: 2023-08-25 | End: 2023-08-25

## 2023-08-25 RX ORDER — NAPROXEN 500 MG/1
500 TABLET ORAL 2 TIMES DAILY WITH MEALS
Qty: 14 TABLET | Refills: 0 | Status: SHIPPED | OUTPATIENT
Start: 2023-08-25 | End: 2023-09-01

## 2023-08-25 RX ORDER — LIDOCAINE HYDROCHLORIDE AND EPINEPHRINE 10; 10 MG/ML; UG/ML
20 INJECTION, SOLUTION INFILTRATION; PERINEURAL ONCE
Status: COMPLETED | OUTPATIENT
Start: 2023-08-25 | End: 2023-08-25

## 2023-08-25 RX ORDER — SULFAMETHOXAZOLE AND TRIMETHOPRIM 800; 160 MG/1; MG/1
1 TABLET ORAL 2 TIMES DAILY
Qty: 14 TABLET | Refills: 0 | Status: SHIPPED | OUTPATIENT
Start: 2023-08-25 | End: 2023-09-01

## 2023-08-25 RX ADMIN — LIDOCAINE HYDROCHLORIDE,EPINEPHRINE BITARTRATE 20 ML: 10; .01 INJECTION, SOLUTION INFILTRATION; PERINEURAL at 16:05

## 2023-08-25 RX ADMIN — CEFTRIAXONE 2000 MG: 2 INJECTION, SOLUTION INTRAVENOUS at 16:06

## 2023-08-25 NOTE — ED PROVIDER NOTES
History  Chief Complaint   Patient presents with   • Elbow Swelling     Pt presents ambulatory c/o right elbow swelling and redness. Was seen at [facility] on Monday and given abx with no relief. 73-year-old male presents to the ER for evaluation of elbow swelling. PMH: Arthritis, COPD 3 L nasal cannula at baseline. Patient stated that he noticed approximately a week of right elbow pain and was seen evaluated 4 days ago and was diagnosed with cellulitis and advised to take clindamycin. Patient has been compliant with antibiotics however the right elbow has become more swollen and tender to touch. Patient woke up this morning and noticed that the erythema has spread down to his right forearm. Pain is described as dull and aching with increased pain with palpation and has intermittent sharp stabbing pain down his right hand. Neurovascularly intact, full range of motion, sensation equal bilaterally. Patient has been afebrile. Denies chills, nausea, vomiting, diarrhea. Patient is not immunocompromise. History provided by:  Patient      Prior to Admission Medications   Prescriptions Last Dose Informant Patient Reported? Taking?    Anoro Ellipta 62.5-25 MCG/ACT inhaler  Self No No   Sig: INHALE 1 PUFF DAILY   Ventolin  (90 Base) MCG/ACT inhaler  Self No No   Sig: Inhale 2 puffs every 6 (six) hours as needed for wheezing   albuterol (2.5 mg/3 mL) 0.083 % nebulizer solution  Self No No   Sig: Take 3 mL (2.5 mg total) by nebulization 4 (four) times a day   Patient not taking: Reported on 4/18/2023   fluticasone (FLONASE) 50 mcg/act nasal spray   No No   Sig: SPRAY 2 SPRAYS INTO EACH NOSTRIL EVERY DAY   nabumetone (RELAFEN) 500 mg tablet   No No   Sig: Take 1 tablet (500 mg total) by mouth 2 (two) times a day as needed (Back pain)   omeprazole (PriLOSEC) 20 mg delayed release capsule   No No   Sig: Take 1 capsule (20 mg total) by mouth daily   predniSONE 5 mg tablet   No No   Sig: TAKE 1 TABLET (5 MG TOTAL) BY MOUTH DAILY. 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 E-Cigarette/Vaping   • E-Cigarette Use Never User      E-Cigarette/Vaping Substances   • Nicotine No    • THC No    • CBD No    • Flavoring No    • Other No    • Unknown No      Social History     Tobacco Use   • Smoking status: Former     Types: Cigars     Start date:      Quit date:      Years since quittin.6     Passive exposure: Past   • Smokeless tobacco: Former     Types: Chew     Quit date:    Vaping Use   • Vaping Use: Never used   Substance Use Topics   • Alcohol use: Yes     Comment: OCCASIONAL    • Drug use: No       Review of Systems   Constitutional: Negative for chills and fever. Respiratory: Negative for cough and shortness of breath. On 3 L/ NC baseline   Cardiovascular: Negative for chest pain and palpitations. Gastrointestinal: Negative for abdominal pain, diarrhea, nausea and vomiting. Musculoskeletal: Positive for joint swelling. Negative for arthralgias and back pain. Skin: Positive for rash (Erythema on right elbow extending down to right forearm). Negative for color change. Neurological: Negative for dizziness, seizures and syncope.    All other systems reviewed and are negative. Physical Exam  Physical Exam  Vitals and nursing note reviewed. Constitutional:       General: He is not in acute distress. Appearance: He is well-developed. HENT:      Head: Normocephalic and atraumatic. Right Ear: External ear normal.      Left Ear: External ear normal.   Eyes:      Conjunctiva/sclera: Conjunctivae normal.   Cardiovascular:      Rate and Rhythm: Normal rate and regular rhythm. Pulses: Normal pulses. Heart sounds: Normal heart sounds. No murmur heard. Pulmonary:      Effort: Pulmonary effort is normal. No respiratory distress. Breath sounds: Normal breath sounds. Abdominal:      Palpations: Abdomen is soft. Tenderness: There is no abdominal tenderness. Musculoskeletal:      Right elbow: Swelling and effusion present. Tenderness present. Cervical back: Neck supple. Comments: Erythema, swelling, hot to touch from posterior aspect of elbow to mid forearm of the right side   Skin:     General: Skin is warm and dry. Capillary Refill: Capillary refill takes less than 2 seconds. Neurological:      Mental Status: He is alert and oriented to person, place, and time. Mental status is at baseline.    Psychiatric:         Mood and Affect: Mood normal.         Behavior: Behavior normal.         Vital Signs  ED Triage Vitals   Temperature Pulse Respirations Blood Pressure SpO2   08/25/23 1442 08/25/23 1441 08/25/23 1441 08/25/23 1441 08/25/23 1441   98 °F (36.7 °C) (!) 109 22 162/74 93 %      Temp Source Heart Rate Source Patient Position - Orthostatic VS BP Location FiO2 (%)   08/25/23 1442 08/25/23 1441 -- 08/25/23 1545 --   Oral Monitor  Right arm       Pain Score       08/25/23 1501       3           Vitals:    08/25/23 1441 08/25/23 1445 08/25/23 1500 08/25/23 1545   BP: 162/74 162/74 135/81 137/94   Pulse: (!) 109 (!) 110 (!) 108 91         Visual Acuity      ED Medications  Medications   lidocaine-epinephrine (XYLOCAINE/EPINEPHRINE) 1 %-1:100,000 injection 20 mL (20 mL Infiltration Given by Other 8/25/23 1605)   cefTRIAXone (ROCEPHIN) IVPB (premix in dextrose) 2,000 mg 50 mL (2,000 mg Intravenous New Bag 8/25/23 1606)       Diagnostic Studies  Results Reviewed     Procedure Component Value Units Date/Time    Procalcitonin [[id number]]  (Normal) Collected: 08/25/23 1535    Lab Status: Final result Specimen: Blood from Arm, Left Updated: 08/25/23 1636     Procalcitonin 0.06 ng/ml     Synovial fluid, crystal [[id number]] Collected: 08/25/23 1631    Lab Status: In process Specimen: Synovial Fluid from Joint, Right Elbow Updated: 08/25/23 1635    Synovial fluid, white cell count w/ diff [[id number]] Collected: 08/25/23 1631    Lab Status: In process Specimen: Synovial Fluid from Joint, Right Elbow Updated: 08/25/23 1635    Synovial fluid, RBC count [[id number]] Collected: 08/25/23 1631    Lab Status: In process Specimen: Synovial Fluid from Joint, Right Elbow Updated: 08/25/23 1635    Synovial fluid, Culture and Gram stain [[id number]] Collected: 08/25/23 1631    Lab Status:  In process Specimen: Synovial Fluid from Joint, Right Elbow Updated: 08/25/23 1635    Comprehensive metabolic panel [[id number]] Collected: 08/25/23 1535    Lab Status: Final result Specimen: Blood from Arm, Left Updated: 08/25/23 1620     Sodium 141 mmol/L      Potassium 4.3 mmol/L      Chloride 102 mmol/L      CO2 32 mmol/L      ANION GAP 7 mmol/L      BUN 14 mg/dL      Creatinine 1.26 mg/dL      Glucose 140 mg/dL      Calcium 9.7 mg/dL      AST 33 U/L      ALT 43 U/L      Alkaline Phosphatase 68 U/L      Total Protein 7.5 g/dL      Albumin 4.2 g/dL      Total Bilirubin 0.57 mg/dL      eGFR 53 ml/min/1.73sq m     Narrative:      [place] guidelines for Chronic Kidney Disease (CKD):   •  Stage 1 with normal or high GFR (GFR > 90 mL/min/1.73 square meters)  •  Stage 2 Mild CKD (GFR = 60-89 mL/min/1.73 square meters)  •  Stage 3A Moderate CKD (GFR = 45-59 mL/min/1.73 square meters)  •  Stage 3B Moderate CKD (GFR = 30-44 mL/min/1.73 square meters)  •  Stage 4 Severe CKD (GFR = 15-29 mL/min/1.73 square meters)  •  Stage 5 End Stage CKD (GFR <15 mL/min/1.73 square meters)  Note: GFR calculation is accurate only with a steady state creatinine    C-reactive protein [[id number]]  (Abnormal) Collected: 08/25/23 1535    Lab Status: Final result Specimen: Blood from Arm, Left Updated: 08/25/23 1620     CRP 38.1 mg/L     Lactic acid [[id number]]  (Normal) Collected: 08/25/23 1535    Lab Status: Final result Specimen: Blood from Arm, Left Updated: 08/25/23 1617     LACTIC ACID 0.7 mmol/L     Narrative:      Result may be elevated if tourniquet was used during collection. Aryan Patel [[id number]]  (Normal) Collected: 08/25/23 1535    Lab Status: Final result Specimen: Blood from Arm, Left Updated: 08/25/23 1613     Protime 13.8 seconds      INR 1.00    APTT [[id number]]  (Normal) Collected: 08/25/23 1535    Lab Status: Final result Specimen: Blood from Arm, Left Updated: 08/25/23 1613     PTT 30 seconds     STAT Gram Stain [[id number]] Collected: 08/25/23 1605    Lab Status:  In process Specimen: Synovial Fluid from Joint, Right Elbow Updated: 08/25/23 1606    Sedimentation rate, automated [[id number]]  (Normal) Collected: 08/25/23 1535    Lab Status: Final result Specimen: Blood from Arm, Left Updated: 08/25/23 1554     Sed Rate 18 mm/hour     CBC and differential [[id number]]  (Abnormal) Collected: 08/25/23 1535    Lab Status: Final result Specimen: Blood from Arm, Left Updated: 08/25/23 1550     WBC 9.95 Thousand/uL      RBC 5.11 Million/uL      Hemoglobin 16.3 g/dL      Hematocrit 48.9 %      MCV 96 fL      MCH 31.9 pg      MCHC 33.3 g/dL      RDW 12.5 %      MPV 11.2 fL      Platelets 841 Thousands/uL      nRBC 0 /100 WBCs      Neutrophils Relative 88 %      Immat GRANS % 0 %      Lymphocytes Relative 4 %      Monocytes Relative 7 %      Eosinophils Relative 1 %      Basophils Relative 0 %      Neutrophils Absolute 8.71 Thousands/µL      Immature Grans Absolute 0.04 Thousand/uL      Lymphocytes Absolute 0.42 Thousands/µL      Monocytes Absolute 0.70 Thousand/µL      Eosinophils Absolute 0.06 Thousand/µL      Basophils Absolute 0.02 Thousands/µL     Blood culture #1 [[id number]] Collected: 08/25/23 1535    Lab Status: In process Specimen: Blood from Arm, Left Updated: 08/25/23 1546    Blood culture #2 [[id number]] Collected: 08/25/23 1536    Lab Status: In process Specimen: Blood from Arm, Right Updated: 08/25/23 1546                 No orders to display              Procedures  Arthrocentesis    Date/Time: 8/25/2023 3:45 PM    Performed by: OUMAR Garcia  Authorized by: OUMAR Garcia  Universal Protocol:  Procedure performed by: Pedro Syed MD)  Consent: Verbal consent obtained. Risks and benefits: risks, benefits and alternatives were discussed  Consent given by: patient and spouse  Time out: Immediately prior to procedure a "time out" was called to verify the correct patient, procedure, equipment, support staff and site/side marked as required.   Timeout called at: 8/25/2023 3:45 PM.  Patient understanding: patient states understanding of the procedure being performed  Patient consent: the patient's understanding of the procedure matches consent given  Site marked: the operative site was marked  Patient identity confirmed: verbally with patient and arm band      Location:  Bedside  Indications:  Joint swelling, possible septic joint, diagnostic evaluation and pain  Body area:  Elbow  Joint:  Right elbow  Local anesthesia used?: Yes    Anesthesia:  Local infiltration  Local anesthetic:  Lidocaine 1% without epinephrine  Anesthetic total (ml):  3  Preparation: Patient was prepped and draped in usual sterile fashion    Needle size:  18 G  Ultrasound guidance: No    Approach:  Posterior  Aspirate amount (ml):  12  Aspirate:  Blood-tinged and serous  Patient tolerance:  Patient tolerated the procedure well with no immediate complications             ED Course  ED Course as of 08/25/23 1717   Fri Aug 25, 2023   1540 CBC and differential(!)  Reviewed. Negative for leukocytosis. 1545 Comprehensive metabolic panel  Reviewed. Negative for metabolic derangements. 1545 Lactic acid  Negative for lactic acidosis: 0.7   1600 C-reactive protein(!)  38.1   1644 Procalcitonin  0.06                               SBIRT 20yo+    Flowsheet Row Most Recent Value   Initial Alcohol Screen: US AUDIT-C     1. How often do you have a drink containing alcohol? 0 Filed at: 08/25/2023 1458   2. How many drinks containing alcohol do you have on a typical day you are drinking? 0 Filed at: 08/25/2023 1458   3a. Male UNDER 65: How often do you have five or more drinks on one occasion? 0 Filed at: 08/25/2023 1458   Audit-C Score 0 Filed at: 08/25/2023 1458   KARIE: How many times in the past year have you. .. Used an illegal drug or used a prescription medication for non-medical reasons? Never Filed at: 08/25/2023 1458                    Medical Decision Making  72-year-old well appearing male presents to the ER for evaluation of right elbow pain. patient was having approximately a week to week and a half of right elbow pain. Patient was seen and evaluated at urgent care 4 days ago and was placed on clindamycin. Patient woke up this morning with spreading of an erythematous, hot rash to his forearm. Patient stated he has had this in the past and has needed his elbow drained. Patient had an arthrocentesis with approximately 12 cc of clear/blood-tinged synovial fluid aspirated. Patient given dose of Rocephin. Patient was initially worked up for sepsis due to mild tachycardia. Patient had a CBC and was negative for leukocytosis. Lactic acid negative and Procal negative. CMP negative for metabolic derangements. Sed rate normal and CRP mildly elevated. Blood cultures pending.   Synovial fluid sent for Gram stain and culture. Patient was given a prescription for Bactrim. Advised strict follow-up with orthopedics. Wound care discussed. Return to ER if symptoms worsens or signs of infections worsen. Amount and/or Complexity of Data Reviewed  Labs: ordered. Decision-making details documented in ED Course. Risk  Prescription drug management. Disposition  Final diagnoses:   Olecranon bursitis   Cellulitis     Time reflects when diagnosis was documented in both MDM as applicable and the Disposition within this note     Time User Action Codes Description Comment    8/25/2023  4:51 PM Mikayla David Add [M70.20] Olecranon bursitis     8/25/2023  4:52 PM Mikayla David Add [Z63.06] Cellulitis       ED Disposition     ED Disposition   Discharge    Condition   Stable    Date/Time   Fri Aug 25, 2023  4:51 PM    Comment   Brielle Narvaez discharge to home/self care. Follow-up Information     Follow up With Specialties Details Why Contact Info    Roxana Nolasco MD Family Medicine   [address]  [address] , DO Orthopedic Surgery   [address] [place] [address]  [phone]            Patient's Medications   Discharge Prescriptions    NAPROXEN (NAPROSYN) 500 MG TABLET    Take 1 tablet (500 mg total) by mouth 2 (two) times a day with meals for 7 days       Start Date: 8/25/2023 End Date: 9/1/2023       Order Dose: 500 mg       Quantity: 14 tablet    Refills: 0    SULFAMETHOXAZOLE-TRIMETHOPRIM (BACTRIM DS) 800-160 MG PER TABLET    Take 1 tablet by mouth 2 (two) times a day for 7 days smx-tmp DS (BACTRIM) 800-160 mg tabs (1tab q12 D10)       Start Date: 8/25/2023 End Date: 9/1/2023       Order Dose: 1 tablet       Quantity: 14 tablet    Refills: 0       No discharge procedures on file.     PDMP Review     None          ED Provider  Electronically Signed by           Mikayla David [address] [place] [address]  08/25/23 7818

## 2023-08-25 NOTE — DISCHARGE INSTRUCTIONS
Follow up with orthopedics!!!   Return to ER if symptoms worsen: red streaking, pus drainage, fever, chills, vomiting    Ace wrap; bulking dressing to the elbow or buy a athelic elbow support  Keep wound clean and dry  Take antibiotic with food and as prescribed

## 2023-08-27 LAB
BACTERIA BLD CULT: NORMAL
BACTERIA BLD CULT: NORMAL
BACTERIA SPEC BFLD CULT: ABNORMAL
GRAM STN SPEC: ABNORMAL

## 2023-08-28 ENCOUNTER — VBI (OUTPATIENT)
Dept: FAMILY MEDICINE CLINIC | Facility: CLINIC | Age: 83
End: 2023-08-28

## 2023-08-28 LAB
BACTERIA SPEC BFLD CULT: ABNORMAL
GRAM STN SPEC: ABNORMAL

## 2023-08-28 NOTE — TELEPHONE ENCOUNTER
08/28/23 9:08 AM    Patient contacted post ED visit, first outreach attempt made. Message was left for patient to return a call to the VBI Department at [facility]: Phone [phone]. Thank you. Alfredo [facility]     08/29/23 10:59 AM    Patient contacted post ED visit, VBI phone outreaches documented. Patient called practice and scheduled a follow-up ED visit. Pt called practice spoke with practice regarding his ED visit. Thank you.   Alfredo [facility]

## 2023-08-29 ENCOUNTER — TELEPHONE (OUTPATIENT)
Dept: FAMILY MEDICINE CLINIC | Facility: CLINIC | Age: 83
End: 2023-08-29

## 2023-08-29 NOTE — TELEPHONE ENCOUNTER
He is on an appropriate antibiotic for the infection, if it is not getting better or starts to worsen he needs to make a follow-up appt. Render Note In Bullet Format When Appropriate: No Duration Of Freeze Thaw-Cycle (Seconds): 0 Consent: The patient's consent was obtained including but not limited to risks of crusting, scabbing, blistering, scarring, darker or lighter pigmentary change, recurrence, incomplete removal and infection. Detail Level: Detailed Post-Care Instructions: I reviewed with the patient in detail post-care instructions. Patient is to wear sunprotection, and avoid picking at any of the treated lesions. Pt may apply Vaseline to crusted or scabbing areas. Show Aperture Variable?: Yes

## 2023-08-29 NOTE — TELEPHONE ENCOUNTER
T/c from pt's wife - was told to call and get results of his tests he had done in the ED on 8/25. Please advise.

## 2023-08-30 LAB
BACTERIA BLD CULT: NORMAL
BACTERIA BLD CULT: NORMAL

## 2023-08-31 DIAGNOSIS — J43.2 CENTRILOBULAR EMPHYSEMA (HCC): ICD-10-CM

## 2023-08-31 RX ORDER — UMECLIDINIUM BROMIDE AND VILANTEROL TRIFENATATE 62.5; 25 UG/1; UG/1
1 POWDER RESPIRATORY (INHALATION) DAILY
Qty: 60 EACH | Refills: 3 | Status: SHIPPED | OUTPATIENT
Start: 2023-08-31 | End: 2023-09-30

## 2023-09-05 ENCOUNTER — APPOINTMENT (OUTPATIENT)
Dept: LAB | Facility: HOSPITAL | Age: 83
End: 2023-09-05
Payer: COMMERCIAL

## 2023-09-05 DIAGNOSIS — R73.03 PREDIABETES: ICD-10-CM

## 2023-09-05 DIAGNOSIS — E78.5 HYPERLIPIDEMIA, UNSPECIFIED HYPERLIPIDEMIA TYPE: ICD-10-CM

## 2023-09-05 LAB
ALBUMIN SERPL BCP-MCNC: 4.2 G/DL (ref 3.5–5)
ALP SERPL-CCNC: 64 U/L (ref 34–104)
ALT SERPL W P-5'-P-CCNC: 48 U/L (ref 7–52)
ANION GAP SERPL CALCULATED.3IONS-SCNC: 5 MMOL/L
AST SERPL W P-5'-P-CCNC: 26 U/L (ref 13–39)
BASOPHILS # BLD AUTO: 0.02 THOUSANDS/ÂΜL (ref 0–0.1)
BASOPHILS NFR BLD AUTO: 0 % (ref 0–1)
BILIRUB SERPL-MCNC: 0.5 MG/DL (ref 0.2–1)
BUN SERPL-MCNC: 17 MG/DL (ref 5–25)
CALCIUM SERPL-MCNC: 9.8 MG/DL (ref 8.4–10.2)
CHLORIDE SERPL-SCNC: 101 MMOL/L (ref 96–108)
CHOLEST SERPL-MCNC: 142 MG/DL
CO2 SERPL-SCNC: 34 MMOL/L (ref 21–32)
CREAT SERPL-MCNC: 1.39 MG/DL (ref 0.6–1.3)
EOSINOPHIL # BLD AUTO: 0.43 THOUSAND/ÂΜL (ref 0–0.61)
EOSINOPHIL NFR BLD AUTO: 4 % (ref 0–6)
ERYTHROCYTE [DISTWIDTH] IN BLOOD BY AUTOMATED COUNT: 12.8 % (ref 11.6–15.1)
GFR SERPL CREATININE-BSD FRML MDRD: 47 ML/MIN/1.73SQ M
GLUCOSE P FAST SERPL-MCNC: 106 MG/DL (ref 65–99)
HCT VFR BLD AUTO: 49 % (ref 36.5–49.3)
HDLC SERPL-MCNC: 54 MG/DL
HGB BLD-MCNC: 16.1 G/DL (ref 12–17)
IMM GRANULOCYTES # BLD AUTO: 0.05 THOUSAND/UL (ref 0–0.2)
IMM GRANULOCYTES NFR BLD AUTO: 1 % (ref 0–2)
LDLC SERPL CALC-MCNC: 51 MG/DL (ref 0–100)
LYMPHOCYTES # BLD AUTO: 1.45 THOUSANDS/ÂΜL (ref 0.6–4.47)
LYMPHOCYTES NFR BLD AUTO: 14 % (ref 14–44)
MCH RBC QN AUTO: 31.9 PG (ref 26.8–34.3)
MCHC RBC AUTO-ENTMCNC: 32.9 G/DL (ref 31.4–37.4)
MCV RBC AUTO: 97 FL (ref 82–98)
MONOCYTES # BLD AUTO: 0.91 THOUSAND/ÂΜL (ref 0.17–1.22)
MONOCYTES NFR BLD AUTO: 9 % (ref 4–12)
NEUTROPHILS # BLD AUTO: 7.64 THOUSANDS/ÂΜL (ref 1.85–7.62)
NEUTS SEG NFR BLD AUTO: 72 % (ref 43–75)
NONHDLC SERPL-MCNC: 88 MG/DL
NRBC BLD AUTO-RTO: 0 /100 WBCS
PLATELET # BLD AUTO: 197 THOUSANDS/UL (ref 149–390)
PMV BLD AUTO: 11.7 FL (ref 8.9–12.7)
POTASSIUM SERPL-SCNC: 4.1 MMOL/L (ref 3.5–5.3)
PROT SERPL-MCNC: 6.9 G/DL (ref 6.4–8.4)
RBC # BLD AUTO: 5.05 MILLION/UL (ref 3.88–5.62)
SODIUM SERPL-SCNC: 140 MMOL/L (ref 135–147)
TRIGL SERPL-MCNC: 187 MG/DL
WBC # BLD AUTO: 10.5 THOUSAND/UL (ref 4.31–10.16)

## 2023-09-05 PROCEDURE — 85025 COMPLETE CBC W/AUTO DIFF WBC: CPT

## 2023-09-05 PROCEDURE — 83036 HEMOGLOBIN GLYCOSYLATED A1C: CPT

## 2023-09-05 PROCEDURE — 80061 LIPID PANEL: CPT

## 2023-09-05 PROCEDURE — 80053 COMPREHEN METABOLIC PANEL: CPT

## 2023-09-05 PROCEDURE — 36415 COLL VENOUS BLD VENIPUNCTURE: CPT

## 2023-09-06 LAB
EST. AVERAGE GLUCOSE BLD GHB EST-MCNC: 148 MG/DL
HBA1C MFR BLD: 6.8 %

## 2023-09-14 ENCOUNTER — TELEPHONE (OUTPATIENT)
Dept: FAMILY MEDICINE CLINIC | Facility: CLINIC | Age: 83
End: 2023-09-14

## 2023-09-14 NOTE — TELEPHONE ENCOUNTER
Attempted to call pt -- tried 2 different numbers and no answer at the first one and the mailbox is full on the 2nd number.

## 2023-09-14 NOTE — TELEPHONE ENCOUNTER
T/c from pt - was unable to keep his appt with  this morning d/t a flat tire - appt r/s for 10/09/2023 (this was the soonest opening on  schedule). Pt concerned about the bw result from 9/5/2023 and would like for you to review test results.       Please advise

## 2023-09-20 ENCOUNTER — TELEPHONE (OUTPATIENT)
Dept: FAMILY MEDICINE CLINIC | Facility: CLINIC | Age: 83
End: 2023-09-20

## 2023-09-20 DIAGNOSIS — M70.20 OLECRANON BURSITIS, UNSPECIFIED LATERALITY: Primary | ICD-10-CM

## 2023-09-20 NOTE — TELEPHONE ENCOUNTER
VM from pt:     I was in the hospital couple weeks ago for water on the elbow. I need a specialist or whatever recommended by Doctor Astrid Snyder. Thank you. Please advise.

## 2023-10-05 ENCOUNTER — APPOINTMENT (OUTPATIENT)
Dept: RADIOLOGY | Facility: CLINIC | Age: 83
End: 2023-10-05
Payer: COMMERCIAL

## 2023-10-05 ENCOUNTER — OFFICE VISIT (OUTPATIENT)
Dept: OBGYN CLINIC | Facility: CLINIC | Age: 83
End: 2023-10-05
Payer: COMMERCIAL

## 2023-10-05 VITALS
HEART RATE: 107 BPM | BODY MASS INDEX: 24.76 KG/M2 | SYSTOLIC BLOOD PRESSURE: 174 MMHG | WEIGHT: 167.2 LBS | HEIGHT: 69 IN | DIASTOLIC BLOOD PRESSURE: 104 MMHG

## 2023-10-05 DIAGNOSIS — M70.20 OLECRANON BURSITIS, UNSPECIFIED LATERALITY: ICD-10-CM

## 2023-10-05 DIAGNOSIS — M25.521 ELBOW PAIN, RIGHT: ICD-10-CM

## 2023-10-05 DIAGNOSIS — M25.521 ELBOW PAIN, RIGHT: Primary | ICD-10-CM

## 2023-10-05 PROCEDURE — 99203 OFFICE O/P NEW LOW 30 MIN: CPT | Performed by: ORTHOPAEDIC SURGERY

## 2023-10-05 PROCEDURE — 73080 X-RAY EXAM OF ELBOW: CPT

## 2023-10-05 NOTE — PROGRESS NOTES
Patient Name:  Joel Myrick  MRN:  [id number]    Assessment & Plan     1. Elbow pain, right  -     XR elbow 3+ vw right; Future; Expected date: 10/05/2023    2. Olecranon bursitis, unspecified laterality  -     Ambulatory Referral to Orthopedic Surgery        Right elbow olecranon bursitis, resolved  · X-rays reviewed in office today with patient  · Discussed nonoperative treatments of olecranon bursitis including aspiration and injection therapy. At this time, patient symptoms have significantly improved. Will hold off on CSI or aspiration at this time, especially with recent history of possible cellulitis vs possible septic olecranon bursa. · Continue to monitor symptoms and call office if he notices any erythema, pain with range of motion, fevers or chills. Advised patient of surgical intervention by means of I&D, but again, will hold off at this time as patient's symptoms have completely resolved at this time. · Follow up as needed     Chief Complaint     Right elbow pain    History of the Present Illness     Joel Myrick is a RHD 82 y.o. male with Right elbow pain/swelling ongoing for a few months. Patient reports he was seen and evaluated at the end of August and provided script for oral Clindamycin. He did not notice much improvement and was evaluated at the ED on 08/25/2023. Right olecranon bursa was aspirated and was provided IV antibiotics. Today, patient reports he is feeling much better than when he was seen in the ED. He admits the swelling has decreased and is able to perform ADLs and function without complications. He denies fevers or chills. Patient is a former smoker, on 3L oxygen with NC, and is on chronic steroids. Review of Systems     Review of Systems   Constitutional: Negative for chills and fever. HENT: Negative for ear pain and sore throat. Eyes: Negative for pain and visual disturbance. Respiratory: Negative for cough and shortness of breath.     Cardiovascular: Negative for chest pain and palpitations. Gastrointestinal: Negative for abdominal pain and vomiting. Genitourinary: Negative for dysuria and hematuria. Musculoskeletal: Negative for arthralgias and back pain. Skin: Negative for color change and rash. Neurological: Negative for seizures and syncope. All other systems reviewed and are negative. Physical Exam     BP (!) 174/104 Comment: has appt with PCP on Monday to review BP  Pulse (!) 107   Ht 5' 9" (1.753 m)   Wt 75.8 kg (167 lb 3.2 oz)   BMI 24.69 kg/m²     Right elbow  Range of motion 0 to approximately 140 degrees without pain  No erythema, warmth over olecranon  No bogginess or fluctuance over olecranon bursa  No tenderness over elbow, medial or lateral epicondyle  Full composite fist  Neurovascularly intact distally    Eyes:  Anicteric sclerae. Neck:  Supple. Lungs:  Normal respiratory effort. Cardiovascular:  Capillary refill is less than 2 seconds. Skin:  Intact without erythema. Neurologic:  Sensation grossly intact to light touch. Psychiatric:  Mood and affect are appropriate. Data Review     I have personally reviewed pertinent films in PACS, and my interpretation follows:    X-rays taken 10/05/2023 of Right elbow independently reviewed and demonstrate no acute fracture or dislocation noted. Minimal soft tissue swelling over olecranon, may indicate olecranon bursitis.      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        Allergies   Allergen Reactions   • Codeine Nausea Only   • Cortisone      Other reaction(s): Unknown • Penicillin G      Other reaction(s): Unknown   • Penicillins        Current Outpatient Medications on File Prior to Visit   Medication Sig Dispense Refill   • fluticasone (FLONASE) 50 mcg/act nasal spray SPRAY 2 SPRAYS INTO EACH NOSTRIL EVERY DAY 48 mL 1   • nabumetone (RELAFEN) 500 mg tablet Take 1 tablet (500 mg total) by mouth 2 (two) times a day as needed (Back pain) 60 tablet 5   • omeprazole (PriLOSEC) 20 mg delayed release capsule Take 1 capsule (20 mg total) by mouth daily 90 capsule 5   • predniSONE 5 mg tablet TAKE 1 TABLET (5 MG TOTAL) BY MOUTH DAILY. 90 tablet 1   • rosuvastatin (CRESTOR) 20 MG tablet TAKE 1 TABLET BY MOUTH EVERY DAY 90 tablet 0   • Ventolin  (90 Base) MCG/ACT inhaler Inhale 2 puffs every 6 (six) hours as needed for wheezing 18 g 5   • albuterol (2.5 mg/3 mL) 0.083 % nebulizer solution Take 3 mL (2.5 mg total) by nebulization 4 (four) times a day (Patient not taking: Reported on 2023) 1080 mL 3   • Anoro Ellipta 62.5-25 MCG/ACT inhaler INHALE 1 PUFF DAILY 60 each 3   • naproxen (Naprosyn) 500 mg tablet Take 1 tablet (500 mg total) by mouth 2 (two) times a day with meals for 7 days 14 tablet 0     No current facility-administered medications on file prior to visit.        Social History     Tobacco Use   • Smoking status: Former     Types: Cigars     Start date:      Quit date: 2014     Years since quittin.7     Passive exposure: Past   • Smokeless tobacco: Former     Types: Chew     Quit date:    Vaping Use   • Vaping Use: Never used   Substance Use Topics   • Alcohol use: Yes     Comment: OCCASIONAL    • Drug use: No       Family History   Problem Relation Age of Onset   • Heart failure Mother    • Diabetes Mother    • Other Mother         MENTAL DISORDER              Procedures Performed     Procedures  None       Suszanne Dakins, PA-C

## 2023-10-09 ENCOUNTER — OFFICE VISIT (OUTPATIENT)
Dept: FAMILY MEDICINE CLINIC | Facility: CLINIC | Age: 83
End: 2023-10-09
Payer: COMMERCIAL

## 2023-10-09 VITALS
OXYGEN SATURATION: 97 % | DIASTOLIC BLOOD PRESSURE: 88 MMHG | TEMPERATURE: 108 F | SYSTOLIC BLOOD PRESSURE: 152 MMHG | WEIGHT: 167 LBS | HEIGHT: 69 IN | BODY MASS INDEX: 24.73 KG/M2

## 2023-10-09 DIAGNOSIS — K21.9 GASTROESOPHAGEAL REFLUX DISEASE, UNSPECIFIED WHETHER ESOPHAGITIS PRESENT: ICD-10-CM

## 2023-10-09 DIAGNOSIS — E11.22 TYPE 2 DIABETES MELLITUS WITH STAGE 3A CHRONIC KIDNEY DISEASE, WITHOUT LONG-TERM CURRENT USE OF INSULIN (HCC): ICD-10-CM

## 2023-10-09 DIAGNOSIS — E78.5 HYPERLIPIDEMIA, UNSPECIFIED HYPERLIPIDEMIA TYPE: ICD-10-CM

## 2023-10-09 DIAGNOSIS — J43.2 CENTRILOBULAR EMPHYSEMA (HCC): Primary | ICD-10-CM

## 2023-10-09 DIAGNOSIS — F32.9 REACTIVE DEPRESSION: ICD-10-CM

## 2023-10-09 DIAGNOSIS — M51.9 LUMBAR DISC DISEASE: ICD-10-CM

## 2023-10-09 DIAGNOSIS — N18.31 TYPE 2 DIABETES MELLITUS WITH STAGE 3A CHRONIC KIDNEY DISEASE, WITHOUT LONG-TERM CURRENT USE OF INSULIN (HCC): ICD-10-CM

## 2023-10-09 DIAGNOSIS — N18.31 STAGE 3A CHRONIC KIDNEY DISEASE (HCC): ICD-10-CM

## 2023-10-09 DIAGNOSIS — J96.11 CHRONIC HYPOXEMIC RESPIRATORY FAILURE (HCC): ICD-10-CM

## 2023-10-09 PROBLEM — R73.03 PREDIABETES: Status: RESOLVED | Noted: 2020-02-24 | Resolved: 2023-10-09

## 2023-10-09 PROCEDURE — 99214 OFFICE O/P EST MOD 30 MIN: CPT | Performed by: FAMILY MEDICINE

## 2023-10-09 RX ORDER — CLINDAMYCIN HYDROCHLORIDE 300 MG/1
CAPSULE ORAL
COMMUNITY
Start: 2023-08-22

## 2023-10-09 RX ORDER — TRAZODONE HYDROCHLORIDE 100 MG/1
100 TABLET ORAL
Qty: 30 TABLET | Refills: 5 | Status: SHIPPED | OUTPATIENT
Start: 2023-10-09

## 2023-10-09 NOTE — ASSESSMENT & PLAN NOTE
Lab Results   Component Value Date    EGFR 47 09/05/2023    EGFR 53 08/25/2023    EGFR 56 02/22/2022    CREATININE 1.39 (H) 09/05/2023    CREATININE 1.26 08/25/2023    CREATININE 1.21 02/22/2022   Taking Relafen for low back pain. Advised to limit Relafen use.

## 2023-10-09 NOTE — ASSESSMENT & PLAN NOTE
Continue Anoro Ellipta 62 point 5-25 albuterol and prednisone 5 mg daily.   Follow-up with pulmonology

## 2023-10-09 NOTE — PROGRESS NOTES
Name: Sachin Mayberry      : 1940      MRN: [id number]  Encounter Provider: Lashay Fernandez MD  Encounter Date: 10/9/2023   Encounter department: [address] [place] [address] [facility]   Return visit in 3 months with fasting blood prior to visit  1. Centrilobular emphysema (HCC)  Assessment & Plan:  Continue Anoro Ellipta 62 point 5-25 albuterol and prednisone 5 mg daily. Follow-up with pulmonology      2. Chronic hypoxemic respiratory failure (HCC)  Assessment & Plan:  Continuous nasal O2      3. Gastroesophageal reflux disease, unspecified whether esophagitis present  Assessment & Plan:  Continue omeprazole 20 mg daily      4. Stage 3a chronic kidney disease [facility])  Assessment & Plan:  Lab Results   Component Value Date    EGFR 47 2023    EGFR 53 2023    EGFR 56 2022    CREATININE 1.39 (H) 2023    CREATININE 1.26 2023    CREATININE 1.21 2022   Taking Relafen for low back pain. Advised to limit Relafen use. 5. Hyperlipidemia, unspecified hyperlipidemia type  Assessment & Plan:  Continue Crestor 20 mg daily    Orders:  -     Comprehensive metabolic panel; Future; Expected date: 2024  -     CBC and differential; Future; Expected date: 2024  -     Lipid Panel with Direct LDL reflex; Future; Expected date: 2024    6. Type 2 diabetes mellitus with stage 3a chronic kidney disease, without long-term current use of insulin ([address])  Assessment & Plan:  New onset diabetes mellitus. Lab Results   Component Value Date    HGBA1C 6.8 (H) 2023       Orders:  -     Hemoglobin A1C; Future; Expected date: 2024  -     Albumin / creatinine urine ratio; Future; Expected date: 2024    7. Reactive depression  -     traZODone (DESYREL) 100 mg tablet; Take 1 tablet (100 mg total) by mouth daily at bedtime    8. Lumbar disc disease         Subjective      Patient comes in for checkup.   He is upset regarding his inability to do anything due to his emphysema. He sometimes is up at night dwelling on this. Review of Systems   Constitutional: Negative. HENT: Negative. Respiratory: Positive for shortness of breath. Cardiovascular: Negative. Psychiatric/Behavioral: Positive for dysphoric mood. Current Outpatient Medications on File Prior to Visit   Medication Sig   • albuterol (2.5 mg/3 mL) 0.083 % nebulizer solution Take 3 mL (2.5 mg total) by nebulization 4 (four) times a day   • clindamycin (CLEOCIN) 300 MG capsule TAKE 1 CAPSULE BY MOUTH THREE TIMES A DAY FOR 10 DAYS   • fluticasone (FLONASE) 50 mcg/act nasal spray SPRAY 2 SPRAYS INTO EACH NOSTRIL EVERY DAY   • nabumetone (RELAFEN) 500 mg tablet Take 1 tablet (500 mg total) by mouth 2 (two) times a day as needed (Back pain)   • omeprazole (PriLOSEC) 20 mg delayed release capsule Take 1 capsule (20 mg total) by mouth daily   • predniSONE 5 mg tablet TAKE 1 TABLET (5 MG TOTAL) BY MOUTH DAILY. • rosuvastatin (CRESTOR) 20 MG tablet TAKE 1 TABLET BY MOUTH EVERY DAY   • Ventolin  (90 Base) MCG/ACT inhaler Inhale 2 puffs every 6 (six) hours as needed for wheezing   • Anoro Ellipta 62.5-25 MCG/ACT inhaler INHALE 1 PUFF DAILY   • [DISCONTINUED] naproxen (Naprosyn) 500 mg tablet Take 1 tablet (500 mg total) by mouth 2 (two) times a day with meals for 7 days       Objective     /88 (BP Location: Left arm, Patient Position: Sitting, Cuff Size: Standard)   Temp (!) 108 °F (42.2 °C) Comment: unable to obtain  Ht 5' 9" (1.753 m)   Wt 75.8 kg (167 lb)   SpO2 97%   BMI 24.66 kg/m²     Physical Exam  Constitutional:       Appearance: Normal appearance. He is well-developed. HENT:      Head: Normocephalic and atraumatic. Eyes:      Pupils: Pupils are equal, round, and reactive to light. Cardiovascular:      Rate and Rhythm: Normal rate and regular rhythm. Heart sounds: Normal heart sounds.    Pulmonary:      Effort: Pulmonary effort is normal. Breath sounds: Normal breath sounds. Comments: Mild shortness of breath  Musculoskeletal:         General: Normal range of motion. Cervical back: Neck supple. Skin:     General: Skin is warm and dry. Neurological:      Mental Status: He is alert.    Psychiatric:         Mood and Affect: Mood normal.         Behavior: Behavior normal.       Nayely Warner MD

## 2023-10-13 ENCOUNTER — HOSPITAL ENCOUNTER (OUTPATIENT)
Dept: CT IMAGING | Facility: CLINIC | Age: 83
Discharge: HOME | End: 2023-10-13
Payer: COMMERCIAL

## 2023-10-13 DIAGNOSIS — R91.1 LUNG NODULE: ICD-10-CM

## 2023-10-13 PROCEDURE — G1004 CDSM NDSC: HCPCS

## 2023-10-13 PROCEDURE — 71250 CT THORAX DX C-: CPT

## 2023-10-25 ENCOUNTER — TELEPHONE (OUTPATIENT)
Dept: FAMILY MEDICINE CLINIC | Facility: CLINIC | Age: 83
End: 2023-10-25

## 2023-10-25 DIAGNOSIS — F32.9 REACTIVE DEPRESSION: Primary | ICD-10-CM

## 2023-10-25 NOTE — TELEPHONE ENCOUNTER
T/c from pt -- called to report to Dr Kaity Farias that the medication he was given at his last appt makes him dizzy and nauseous and he can not take it. Reports it took him multiple days to recover from taking just one dose.

## 2023-10-26 RX ORDER — MIRTAZAPINE 7.5 MG/1
7.5 TABLET, FILM COATED ORAL
Qty: 30 TABLET | Refills: 5 | Status: SHIPPED | OUTPATIENT
Start: 2023-10-26

## 2023-10-26 NOTE — TELEPHONE ENCOUNTER
Please call patient back. I sent a new prescription to replace trazodone . This is at a very low dose and has less side effects. It may take up to 2 weeks to be effective. Call back if there are any problems.     Informed Shanel Padilla - she expressed understanding

## 2023-10-30 ENCOUNTER — OFFICE VISIT (OUTPATIENT)
Age: 83
End: 2023-10-30

## 2023-10-30 VITALS
OXYGEN SATURATION: 93 % | HEIGHT: 70 IN | HEART RATE: 81 BPM | SYSTOLIC BLOOD PRESSURE: 139 MMHG | WEIGHT: 170 LBS | DIASTOLIC BLOOD PRESSURE: 81 MMHG | BODY MASS INDEX: 24.34 KG/M2 | RESPIRATION RATE: 16 BRPM

## 2023-10-30 DIAGNOSIS — Z23 NEED FOR INFLUENZA VACCINATION: ICD-10-CM

## 2023-10-30 DIAGNOSIS — Z87.891 FORMER SMOKER: ICD-10-CM

## 2023-10-30 DIAGNOSIS — J96.11 CHRONIC HYPOXEMIC RESPIRATORY FAILURE (HCC): ICD-10-CM

## 2023-10-30 DIAGNOSIS — J43.2 CENTRILOBULAR EMPHYSEMA (HCC): Primary | ICD-10-CM

## 2023-10-30 RX ORDER — UMECLIDINIUM BROMIDE AND VILANTEROL TRIFENATATE 62.5; 25 UG/1; UG/1
1 POWDER RESPIRATORY (INHALATION) DAILY
Qty: 60 EACH | Refills: 3 | Status: SHIPPED | OUTPATIENT
Start: 2023-10-30

## 2023-10-30 NOTE — ASSESSMENT & PLAN NOTE
-Mild upper and severe lower lobe emphysema on CT imaging  -PFTs in 2021 with severe obstruction and severely reduced diffusion capacity  -No recent exacerbations or frequent need for albuterol  -Tried pulmonary rehab previously, but did not tolerate due to back pain.   Does not want to return.  -Continue Anoro 1 puff daily and albuterol every 6 hours PRN  -Flu vaccine administered today  -Follow up in 6 months or sooner if needed

## 2023-10-30 NOTE — ASSESSMENT & PLAN NOTE
-Approximately 45-pack-year history, quit 7 years ago  -Recent CT lung screening reviewed with patient. The previously seen left upper lobe nodule is no longer visualized.   No new nodules identified.  -Continue with yearly CT lung screening, due October 2024

## 2023-10-30 NOTE — PROGRESS NOTES
Pulmonary Follow Up Note  Allen Cuello 82 y.o. male MRN: [id number]  10/30/2023    Assessment:    Centrilobular emphysema (HCC)  -Mild upper and severe lower lobe emphysema on CT imaging  -PFTs in 2021 with severe obstruction and severely reduced diffusion capacity  -No recent exacerbations or frequent need for albuterol  -Tried pulmonary rehab previously, but did not tolerate due to back pain. Does not want to return.  -Continue Anoro 1 puff daily and albuterol every 6 hours PRN  -Flu vaccine administered today  -Follow up in 6 months or sooner if needed    Former smoker  -Approximately 45-pack-year history, quit 7 years ago  -Recent CT lung screening reviewed with patient. The previously seen left upper lobe nodule is no longer visualized. No new nodules identified.  -Continue with yearly CT lung screening, due October 2024    Chronic hypoxemic respiratory failure ([address])  -He is 93% on 3 L today  -Continue supplemental oxygen      Plan:    Diagnoses and all orders for this visit:    Centrilobular emphysema ([address])  -     umeclidinium-vilanterol (Anoro Ellipta) 62.5-25 mcg/actuation inhaler; Inhale 1 puff daily    Chronic hypoxemic respiratory failure ([address])    Former smoker    Need for influenza vaccination  -     influenza vaccine, high-dose, PF 0.7 mL (FLUZONE HIGH-DOSE)        Return in about 6 months (around 4/30/2024). History of Present Illness     Chief Complaint:   Chief Complaint   Patient presents with    Follow-up       Patient ID: Martyn Lanes is a 82 y.o. y.o. male has a past medical history of Arthritis, COPD (chronic obstructive pulmonary disease) ([address]), Cough, Kidney stones, SOB (shortness of breath), and Wheezing. 10/30/2023  HPI: Allen Cuello is a 82 y.o. male who presents to the office today for a follow up visit. He has a PMH including but not limited to COPD/emphysema, GERD, and CKD3. He is a former smoker with 45 pack year history, quit 7 years ago.  He was previously seen in the office 6 months ago. Maintained on Anoro, albuterol, and supplemental oxygen. Patient states his breathing has overall been stable. Denies any recent exacerbations requiring antibiotics/steroids or ED visits. He gets increased shortness of breath with humidity and "aggravation". Does not require frequent use of his albuterol unless he is having a bad day. Has occasional cough, wheezing, and nasal congestion. Denies any mucus production, postnasal drainage, chest pain/chest tightness or lower extremity swelling. Review of Systems   Constitutional:  Negative for activity change, chills, fever and unexpected weight change. HENT:  Negative for congestion, postnasal drip, rhinorrhea, sore throat and trouble swallowing. Respiratory:  Positive for shortness of breath. Negative for cough, chest tightness and wheezing. Cardiovascular:  Negative for chest pain, palpitations and leg swelling. Allergic/Immunologic: Negative. Historical Information   Past Medical History:   Diagnosis Date    Arthritis     COPD (chronic obstructive pulmonary disease) (HCC)     Cough     Kidney stones     SOB (shortness of breath)     Wheezing      Past Surgical History:   Procedure Laterality Date    ABDOMINAL AORTIC ANEURYSM REPAIR      ENDOVASC REPAIR AAA PLACE VISCERAL EXTENS PROSTH    LAST ASSESSED [id number]    COLONOSCOPY  03/09/2011    HAND SURGERY      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      Family History   Problem Relation Age of Onset    Heart failure Mother     Diabetes Mother     Other Mother         MENTAL DISORDER        Smoking history: He reports that he quit smoking about 9 years ago. His smoking use included cigars. He started smoking about 54 years ago. He has been exposed to tobacco smoke. He quit smokeless tobacco use about 69 years ago. His smokeless tobacco use included chew.     Occupational History:     Immunization History   Administered Date(s) Administered    COVID-19 [employer] VACC 0.25 ML IM BOOSTER 12/15/2021    COVID-19 MODERNA VACC 0.5 ML IM 01/28/2021, 02/24/2021, 12/15/2021    INFLUENZA 10/04/2018, 11/30/2020, 10/18/2022    Influenza Split High Dose Preservative Free IM 10/04/2018, 09/19/2019    Influenza, high dose seasonal 0.7 mL 11/23/2021, 10/18/2022, 10/30/2023    Pneumococcal Conjugate 13-Valent 02/10/2017, 09/19/2019    Pneumococcal Polysaccharide PPV23 03/07/2022    Td (adult), Not Adsorbed 11/27/2016       Meds/Allergies     Current Outpatient Medications:     albuterol (2.5 mg/3 mL) 0.083 % nebulizer solution, Take 3 mL (2.5 mg total) by nebulization 4 (four) times a day, Disp: 1080 mL, Rfl: 3    clindamycin (CLEOCIN) 300 MG capsule, TAKE 1 CAPSULE BY MOUTH THREE TIMES A DAY FOR 10 DAYS, Disp: , Rfl:     fluticasone (FLONASE) 50 mcg/act nasal spray, SPRAY 2 SPRAYS INTO EACH NOSTRIL EVERY DAY, Disp: 48 mL, Rfl: 1    mirtazapine (REMERON) 7.5 MG tablet, Take 1 tablet (7.5 mg total) by mouth daily at bedtime, Disp: 30 tablet, Rfl: 5    nabumetone (RELAFEN) 500 mg tablet, Take 1 tablet (500 mg total) by mouth 2 (two) times a day as needed (Back pain), Disp: 60 tablet, Rfl: 5    omeprazole (PriLOSEC) 20 mg delayed release capsule, Take 1 capsule (20 mg total) by mouth daily, Disp: 90 capsule, Rfl: 5    rosuvastatin (CRESTOR) 20 MG tablet, TAKE 1 TABLET BY MOUTH EVERY DAY, Disp: 90 tablet, Rfl: 0    umeclidinium-vilanterol (Anoro Ellipta) 62.5-25 mcg/actuation inhaler, Inhale 1 puff daily, Disp: 60 each, Rfl: 3    Ventolin  (90 Base) MCG/ACT inhaler, Inhale 2 puffs every 6 (six) hours as needed for wheezing, Disp: 18 g, Rfl: 5    predniSONE 5 mg tablet, TAKE 1 TABLET (5 MG TOTAL) BY MOUTH DAILY. (Patient not taking: Reported on 10/30/2023), Disp: 90 tablet, Rfl: 1  Allergies:    Allergies   Allergen Reactions    Codeine Nausea Only    Cortisone      Other reaction(s): Unknown    Penicillin G      Other reaction(s): Unknown    Penicillins          Vitals:  Vitals: 10/30/23 0814 10/30/23 0816   BP: 139/81    BP Location: Left arm    Patient Position: Sitting    Cuff Size: Large    Pulse: 81    Resp: 16    SpO2: 93% 93%   Weight: 77.1 kg (170 lb)    Height: 5' 10" (1.778 m)    Oxygen Therapy  SpO2: 93 %  Oxygen Therapy: Supplemental oxygen  O2 Flow Rate (L/min): 3 L/min  . Wt Readings from Last 3 Encounters:   10/30/23 77.1 kg (170 lb)   10/09/23 75.8 kg (167 lb)   10/05/23 75.8 kg (167 lb 3.2 oz)     Body mass index is 24.39 kg/m². Physical Exam  Vitals and nursing note reviewed. Constitutional:       General: He is not in acute distress. Appearance: Normal appearance. He is well-developed. Cardiovascular:      Rate and Rhythm: Normal rate and regular rhythm. Heart sounds: Normal heart sounds. No murmur heard. Pulmonary:      Effort: Pulmonary effort is normal. No respiratory distress. Breath sounds: Normal breath sounds. No decreased breath sounds, wheezing, rhonchi or rales. Musculoskeletal:         General: No swelling. Right lower leg: No edema. Left lower leg: No edema. Psychiatric:         Mood and Affect: Mood and affect normal.         Behavior: Behavior normal. Behavior is cooperative. Labs: I have personally reviewed pertinent lab results. Lab Results   Component Value Date    WBC 10.50 (H) 09/05/2023    HGB 16.1 09/05/2023    HCT 49.0 09/05/2023    MCV 97 09/05/2023     09/05/2023     Lab Results   Component Value Date    CALCIUM 9.8 09/05/2023    K 4.1 09/05/2023    CO2 34 (H) 09/05/2023     09/05/2023    BUN 17 09/05/2023    CREATININE 1.39 (H) 09/05/2023     No results found for: "IGE"  Lab Results   Component Value Date    ALT 48 09/05/2023    AST 26 09/05/2023    ALKPHOS 64 09/05/2023       Imaging and other studies: I have personally reviewed pertinent reports and I have personally reviewed pertinent films in PACS     CT chest 10/13/23  Mild upper lobe and severe lower lobe emphysema.  PReviously seen left upper lobe pulmonary nodule is no longer visualized. Pulmonary function testing:     Pulmonary Functions Testing Results: 1/6/21    FEV1/FVC ratio 47%    FEV1 31% predicted  FVC 50% predicted  TLC 56 % predicted  RV 69 % predicted  DLCO corrected for hemoglobin 30 % predicted    Impression: Spirometry demonstrates predominantly severe obstructive ventilatory limitation. Lung volumes moderately decreased. DLCO very severely decreased. Six minute walk: Patient could walk a total distance of 213 minutes 6 minutes, requiring supplemental oxygen of 2 liters/minute on exertion.

## 2023-11-14 DIAGNOSIS — E78.5 HYPERLIPIDEMIA, UNSPECIFIED HYPERLIPIDEMIA TYPE: ICD-10-CM

## 2023-11-14 RX ORDER — ROSUVASTATIN CALCIUM 20 MG/1
20 TABLET, COATED ORAL DAILY
Qty: 90 TABLET | Refills: 0 | Status: SHIPPED | OUTPATIENT
Start: 2023-11-14

## 2023-11-18 DIAGNOSIS — F32.9 REACTIVE DEPRESSION: ICD-10-CM

## 2023-11-19 RX ORDER — MIRTAZAPINE 7.5 MG/1
7.5 TABLET, FILM COATED ORAL
Qty: 90 TABLET | Refills: 2 | Status: SHIPPED | OUTPATIENT
Start: 2023-11-19

## 2024-01-10 DIAGNOSIS — M51.9 LUMBAR DISC DISEASE: ICD-10-CM

## 2024-01-10 RX ORDER — NABUMETONE 500 MG/1
500 TABLET, FILM COATED ORAL 2 TIMES DAILY PRN
Qty: 60 TABLET | Refills: 5 | Status: SHIPPED | OUTPATIENT
Start: 2024-01-10

## 2024-01-17 ENCOUNTER — RA CDI HCC (OUTPATIENT)
Dept: OTHER | Facility: HOSPITAL | Age: 84
End: 2024-01-17

## 2024-03-18 DIAGNOSIS — J43.2 CENTRILOBULAR EMPHYSEMA (HCC): ICD-10-CM

## 2024-03-18 DIAGNOSIS — E78.5 HYPERLIPIDEMIA, UNSPECIFIED HYPERLIPIDEMIA TYPE: ICD-10-CM

## 2024-03-18 RX ORDER — UMECLIDINIUM BROMIDE AND VILANTEROL TRIFENATATE 62.5; 25 UG/1; UG/1
1 POWDER RESPIRATORY (INHALATION) DAILY
Qty: 60 EACH | Refills: 5 | Status: SHIPPED | OUTPATIENT
Start: 2024-03-18

## 2024-03-18 RX ORDER — ROSUVASTATIN CALCIUM 20 MG/1
20 TABLET, COATED ORAL DAILY
Qty: 90 TABLET | Refills: 0 | Status: SHIPPED | OUTPATIENT
Start: 2024-03-18

## 2024-03-18 NOTE — TELEPHONE ENCOUNTER
Reason for call:   [x] Refill   [] Prior Auth  [] Other:     Office:   [] PCP/Provider -   [x] Specialty/Provider -     OUMAR Roman  Pulmonology Centrilobular emphysema (HCC) +3 more  Dx Follow-up  Reason for Visit       Medication:   umeclidinium-vilanterol (Anoro Ellipta) 62.5-25 mcg/actuation inhaler - 1 puff daily # 60 disk      Pharmacy: [facility]/pharmacy #[id number] [facility] [place] - [id number]     Does the patient have enough for 3 days?   [] Yes   [x] No - Send as HP to POD

## 2024-04-10 ENCOUNTER — TELEPHONE (OUTPATIENT)
Age: 84
End: 2024-04-10

## 2024-04-10 NOTE — TELEPHONE ENCOUNTER
Patient has an appointment scheduled with [facility] on 4/22, patient asking is he needs to have any testing done before  this appoinment

## 2024-04-22 ENCOUNTER — OFFICE VISIT (OUTPATIENT)
Age: 84
End: 2024-04-22
Payer: COMMERCIAL

## 2024-04-22 VITALS
WEIGHT: 167.7 LBS | BODY MASS INDEX: 24.84 KG/M2 | HEART RATE: 69 BPM | DIASTOLIC BLOOD PRESSURE: 80 MMHG | SYSTOLIC BLOOD PRESSURE: 124 MMHG | HEIGHT: 69 IN | OXYGEN SATURATION: 96 % | TEMPERATURE: 97.8 F

## 2024-04-22 DIAGNOSIS — J44.9 CHRONIC OBSTRUCTIVE PULMONARY DISEASE, UNSPECIFIED COPD TYPE (HCC): ICD-10-CM

## 2024-04-22 DIAGNOSIS — R06.02 SOB (SHORTNESS OF BREATH): Primary | ICD-10-CM

## 2024-04-22 DIAGNOSIS — J96.11 CHRONIC HYPOXEMIC RESPIRATORY FAILURE (HCC): ICD-10-CM

## 2024-04-22 PROCEDURE — 99214 OFFICE O/P EST MOD 30 MIN: CPT | Performed by: INTERNAL MEDICINE

## 2024-04-22 RX ORDER — ALBUTEROL SULFATE 90 UG/1
2 AEROSOL, METERED RESPIRATORY (INHALATION) EVERY 6 HOURS PRN
Qty: 18 G | Refills: 1 | Status: SHIPPED | OUTPATIENT
Start: 2024-04-22

## 2024-04-22 NOTE — PROGRESS NOTES
"Assessment/Plan:   Diagnoses and all orders for this visit:    SOB (shortness of breath)    Chronic obstructive pulmonary disease, unspecified COPD type (HCC)  -     Ventolin  (90 Base) MCG/ACT inhaler; Inhale 2 puffs every 6 (six) hours as needed for wheezing    Chronic hypoxemic respiratory failure (HCC)        PFTs in 2021 with severe obstructive airflow limitation and severely reduced DLCO  Discussed with the patient to continue with Anoro 1 puff daily  Continue with albuterol MDI 2 puffs 4 times daily as needed  Mild upper and severe lower lobe emphysematous changes on the CT  Currently not a candidate for lung cancer screening due to his age currently turned 83  Although he has 45-pack-year smoking history who quit about 7 years ago.  Continues on 2 L currently today continues supplemental oxygen  Vaccinations up-to-date  Follow-up in 6 months or earlier as needed    No follow-ups on file.  All questions are answered to the patient's satisfaction and understanding.  He verbalizes understanding.  He is encouraged to call with any further questions or concerns.    Portions of the record may have been created with voice recognition software.  Occasional wrong word or \"sound a like\" substitutions may have occurred due to the inherent limitations of voice recognition software.  Read the chart carefully and recognize, using context, where substitutions have occurred.    Electronically Signed by Jannie Covington MD    ______________________________________________________________________    Chief Complaint:   Chief Complaint   Patient presents with    Follow-up       Patient ID: Kei is a 83 y.o. y.o. male has a past medical history of Arthritis, COPD (chronic obstructive pulmonary disease) (HCC), Cough, Kidney stones, SOB (shortness of breath), and Wheezing.    4/22/2024  Patient presents today for follow-up visit.   Kei Roth is a 83y.o. male who presents to the office today for a follow up visit. " "He has a PMH including but not limited to COPD/emphysema, GERD, and CKD3. He is a former smoker with 45 pack year history, quit 7 years ago. He was previously seen in the office 6 months ago. Maintained on Anoro, albuterol, and supplemental oxygen.  Patient states his breathing has overall been stable.  Denies any recent exacerbations requiring antibiotics/steroids or ED visits.  He gets increased shortness of breath with humidity and \"aggravation\" and pollen during this season he says         Review of Systems   Constitutional: Negative.    HENT: Negative.     Eyes: Negative.    Respiratory:  Positive for shortness of breath.    Cardiovascular: Negative.    Gastrointestinal: Negative.    Endocrine: Negative.    Genitourinary: Negative.    Musculoskeletal: Negative.    Allergic/Immunologic: Positive for environmental allergies.   Neurological: Negative.    Hematological: Negative.    Psychiatric/Behavioral: Negative.         Smoking history: He reports that he quit smoking about 10 years ago. His smoking use included cigars. He started smoking about 55 years ago. He has been exposed to tobacco smoke. He quit smokeless tobacco use about 70 years ago.  His smokeless tobacco use included chew.    The following portions of the patient's history were reviewed and updated as appropriate: allergies, current medications, past family history, past medical history, past social history, past surgical history, and problem list.    Immunization History   Administered Date(s) Administered    COVID-19 [employer] VACC 0.25 ML IM BOOSTER 12/15/2021    COVID-19 MODERNA VACC 0.5 ML IM 01/28/2021, 02/24/2021, 12/15/2021    INFLUENZA 10/04/2018, 11/30/2020, 10/18/2022    Influenza Split High Dose Preservative Free IM 10/04/2018, 09/19/2019    Influenza, high dose seasonal 0.7 mL 11/23/2021, 10/18/2022, 10/30/2023    Pneumococcal Conjugate 13-Valent 02/10/2017, 09/19/2019    Pneumococcal Polysaccharide PPV23 03/07/2022    Td (adult), Not " "Adsorbed 11/27/2016     Current Outpatient Medications   Medication Sig Dispense Refill    albuterol (2.5 mg/3 mL) 0.083 % nebulizer solution Take 3 mL (2.5 mg total) by nebulization 4 (four) times a day 1080 mL 3    clindamycin (CLEOCIN) 300 MG capsule TAKE 1 CAPSULE BY MOUTH THREE TIMES A DAY FOR 10 DAYS      fluticasone (FLONASE) 50 mcg/act nasal spray SPRAY 2 SPRAYS INTO EACH NOSTRIL EVERY DAY 48 mL 1    mirtazapine (REMERON) 7.5 MG tablet TAKE 1 TABLET (7.5 MG TOTAL) BY MOUTH DAILY AT BEDTIME 90 tablet 2    nabumetone (RELAFEN) 500 mg tablet TAKE 1 TABLET (500 MG TOTAL) BY MOUTH 2 (TWO) TIMES A DAY AS NEEDED (BACK PAIN) 60 tablet 5    omeprazole (PriLOSEC) 20 mg delayed release capsule Take 1 capsule (20 mg total) by mouth daily 90 capsule 5    rosuvastatin (CRESTOR) 20 MG tablet Take 1 tablet (20 mg total) by mouth daily 90 tablet 0    umeclidinium-vilanterol (Anoro Ellipta) 62.5-25 mcg/actuation inhaler Inhale 1 puff daily 60 each 5    Ventolin  (90 Base) MCG/ACT inhaler INHALE 2 PUFFS EVERY 6 HOURS AS NEEDED FOR WHEEZING 18 g 1    predniSONE 5 mg tablet TAKE 1 TABLET (5 MG TOTAL) BY MOUTH DAILY. (Patient not taking: Reported on 10/30/2023) 90 tablet 1     No current facility-administered medications for this visit.     Allergies: Codeine, Cortisone, Penicillin g, and Penicillins    Objective:  Vitals:    04/22/24 0911   BP: 124/80   Pulse: 69   Temp: 97.8 °F (36.6 °C)   SpO2: 96%   Weight: 76.1 kg (167 lb 11.2 oz)   Height: 5' 9\" (1.753 m)   Oxygen Therapy  SpO2: 96 % (with 3 lts of oxygen)  .  Wt Readings from Last 3 Encounters:   04/22/24 76.1 kg (167 lb 11.2 oz)   10/30/23 77.1 kg (170 lb)   10/09/23 75.8 kg (167 lb)     Body mass index is 24.76 kg/m².    Physical Exam  Vitals and nursing note reviewed.   Constitutional:       Appearance: He is well-developed.   HENT:      Head: Normocephalic and atraumatic.   Eyes:      Conjunctiva/sclera: Conjunctivae normal.      Pupils: Pupils are equal, round, " and reactive to light.   Neck:      Thyroid: No thyromegaly.      Vascular: No JVD.   Cardiovascular:      Rate and Rhythm: Normal rate and regular rhythm.      Heart sounds: Normal heart sounds. No murmur heard.     No friction rub. No gallop.   Pulmonary:      Effort: Pulmonary effort is normal. No respiratory distress.      Breath sounds: Normal breath sounds. No wheezing or rales.   Chest:      Chest wall: No tenderness.   Musculoskeletal:         General: No tenderness or deformity. Normal range of motion.      Cervical back: Normal range of motion and neck supple.   Lymphadenopathy:      Cervical: No cervical adenopathy.   Skin:     General: Skin is warm and dry.   Neurological:      Mental Status: He is alert and oriented to person, place, and time.         Lab Review:   No visits with results within 6 Month(s) from this visit.   Latest known visit with results is:   Appointment on 09/05/2023   Component Date Value    Hemoglobin A1C 09/05/2023 6.8 (H)     EAG 09/05/2023 148     WBC 09/05/2023 10.50 (H)     RBC 09/05/2023 5.05     Hemoglobin 09/05/2023 16.1     Hematocrit 09/05/2023 49.0     MCV 09/05/2023 97     MCH 09/05/2023 31.9     MCHC 09/05/2023 32.9     RDW 09/05/2023 12.8     MPV 09/05/2023 11.7     Platelets 09/05/2023 197     nRBC 09/05/2023 0     Segmented % 09/05/2023 72     Immature Grans % 09/05/2023 1     Lymphocytes % 09/05/2023 14     Monocytes % 09/05/2023 9     Eosinophils Relative 09/05/2023 4     Basophils Relative 09/05/2023 0     Absolute Neutrophils 09/05/2023 7.64 (H)     Absolute Immature Grans 09/05/2023 0.05     Absolute Lymphocytes 09/05/2023 1.45     Absolute Monocytes 09/05/2023 0.91     Eosinophils Absolute 09/05/2023 0.43     Basophils Absolute 09/05/2023 0.02     Sodium 09/05/2023 140     Potassium 09/05/2023 4.1     Chloride 09/05/2023 101     CO2 09/05/2023 34 (H)     ANION GAP 09/05/2023 5     BUN 09/05/2023 17     Creatinine 09/05/2023 1.39 (H)     Glucose, Fasting  09/05/2023 106 (H)     Calcium 09/05/2023 9.8     AST 09/05/2023 26     ALT 09/05/2023 48     Alkaline Phosphatase 09/05/2023 64     Total Protein 09/05/2023 6.9     Albumin 09/05/2023 4.2     Total Bilirubin 09/05/2023 0.50     eGFR 09/05/2023 47     Cholesterol 09/05/2023 142     Triglycerides 09/05/2023 187 (H)     HDL, Direct 09/05/2023 54     LDL Calculated 09/05/2023 51     Non-HDL-Chol (CHOL-HDL) 09/05/2023 88        Diagnostics:  I have personally reviewed pertinent reports.

## 2024-04-29 ENCOUNTER — TELEPHONE (OUTPATIENT)
Dept: FAMILY MEDICINE CLINIC | Facility: CLINIC | Age: 84
End: 2024-04-29

## 2024-05-13 DIAGNOSIS — E78.5 HYPERLIPIDEMIA, UNSPECIFIED HYPERLIPIDEMIA TYPE: ICD-10-CM

## 2024-05-14 RX ORDER — ROSUVASTATIN CALCIUM 20 MG/1
20 TABLET, COATED ORAL DAILY
Qty: 90 TABLET | Refills: 1 | Status: SHIPPED | OUTPATIENT
Start: 2024-05-14

## 2024-06-09 DIAGNOSIS — R09.81 HEAD CONGESTION: ICD-10-CM

## 2024-06-09 RX ORDER — FLUTICASONE PROPIONATE 50 MCG
SPRAY, SUSPENSION (ML) NASAL
Qty: 48 ML | Refills: 1 | Status: SHIPPED | OUTPATIENT
Start: 2024-06-09

## 2024-06-11 ENCOUNTER — TELEPHONE (OUTPATIENT)
Age: 84
End: 2024-06-11

## 2024-07-30 ENCOUNTER — APPOINTMENT (OUTPATIENT)
Dept: LAB | Facility: HOSPITAL | Age: 84
End: 2024-07-30
Payer: COMMERCIAL

## 2024-07-30 ENCOUNTER — TELEPHONE (OUTPATIENT)
Dept: FAMILY MEDICINE CLINIC | Facility: CLINIC | Age: 84
End: 2024-07-30

## 2024-07-30 ENCOUNTER — HOSPITAL ENCOUNTER (OUTPATIENT)
Dept: RADIOLOGY | Facility: HOSPITAL | Age: 84
Discharge: HOME | End: 2024-07-30
Payer: COMMERCIAL

## 2024-07-30 DIAGNOSIS — N18.31 TYPE 2 DIABETES MELLITUS WITH STAGE 3A CHRONIC KIDNEY DISEASE, WITHOUT LONG-TERM CURRENT USE OF INSULIN (HCC): ICD-10-CM

## 2024-07-30 DIAGNOSIS — Z87.442 PERSONAL HISTORY OF URINARY CALCULI: ICD-10-CM

## 2024-07-30 DIAGNOSIS — E78.5 HYPERLIPIDEMIA, UNSPECIFIED HYPERLIPIDEMIA TYPE: ICD-10-CM

## 2024-07-30 DIAGNOSIS — E11.22 TYPE 2 DIABETES MELLITUS WITH STAGE 3A CHRONIC KIDNEY DISEASE, WITHOUT LONG-TERM CURRENT USE OF INSULIN (HCC): ICD-10-CM

## 2024-07-30 DIAGNOSIS — Z12.9 ENCOUNTER FOR SCREENING FOR MALIGNANT NEOPLASM: ICD-10-CM

## 2024-07-30 LAB
ALBUMIN SERPL BCG-MCNC: 4.2 G/DL (ref 3.5–5)
ALP SERPL-CCNC: 70 U/L (ref 34–104)
ALT SERPL W P-5'-P-CCNC: 14 U/L (ref 7–52)
ANION GAP SERPL CALCULATED.3IONS-SCNC: 5 MMOL/L (ref 4–13)
AST SERPL W P-5'-P-CCNC: 16 U/L (ref 13–39)
BASOPHILS # BLD AUTO: 0.03 THOUSANDS/ÂΜL (ref 0–0.1)
BASOPHILS NFR BLD AUTO: 0 % (ref 0–1)
BILIRUB SERPL-MCNC: 0.56 MG/DL (ref 0.2–1)
BUN SERPL-MCNC: 13 MG/DL (ref 5–25)
CALCIUM SERPL-MCNC: 9.2 MG/DL (ref 8.4–10.2)
CHLORIDE SERPL-SCNC: 101 MMOL/L (ref 96–108)
CHOLEST SERPL-MCNC: 120 MG/DL
CO2 SERPL-SCNC: 36 MMOL/L (ref 21–32)
CREAT SERPL-MCNC: 1.12 MG/DL (ref 0.6–1.3)
CREAT UR-MCNC: 237.5 MG/DL
EOSINOPHIL # BLD AUTO: 0.66 THOUSAND/ÂΜL (ref 0–0.61)
EOSINOPHIL NFR BLD AUTO: 7 % (ref 0–6)
ERYTHROCYTE [DISTWIDTH] IN BLOOD BY AUTOMATED COUNT: 12.8 % (ref 11.6–15.1)
EST. AVERAGE GLUCOSE BLD GHB EST-MCNC: 123 MG/DL
GFR SERPL CREATININE-BSD FRML MDRD: 61 ML/MIN/1.73SQ M
GLUCOSE P FAST SERPL-MCNC: 132 MG/DL (ref 65–99)
HBA1C MFR BLD: 5.9 %
HCT VFR BLD AUTO: 47.8 % (ref 36.5–49.3)
HDLC SERPL-MCNC: 48 MG/DL
HGB BLD-MCNC: 15.5 G/DL (ref 12–17)
IMM GRANULOCYTES # BLD AUTO: 0.02 THOUSAND/UL (ref 0–0.2)
IMM GRANULOCYTES NFR BLD AUTO: 0 % (ref 0–2)
LDLC SERPL CALC-MCNC: 47 MG/DL (ref 0–100)
LYMPHOCYTES # BLD AUTO: 1.25 THOUSANDS/ÂΜL (ref 0.6–4.47)
LYMPHOCYTES NFR BLD AUTO: 14 % (ref 14–44)
MCH RBC QN AUTO: 30.7 PG (ref 26.8–34.3)
MCHC RBC AUTO-ENTMCNC: 32.4 G/DL (ref 31.4–37.4)
MCV RBC AUTO: 95 FL (ref 82–98)
MICROALBUMIN UR-MCNC: 312 MG/L
MICROALBUMIN/CREAT 24H UR: 131 MG/G CREATININE (ref 0–30)
MONOCYTES # BLD AUTO: 0.8 THOUSAND/ÂΜL (ref 0.17–1.22)
MONOCYTES NFR BLD AUTO: 9 % (ref 4–12)
NEUTROPHILS # BLD AUTO: 6.25 THOUSANDS/ÂΜL (ref 1.85–7.62)
NEUTS SEG NFR BLD AUTO: 70 % (ref 43–75)
NRBC BLD AUTO-RTO: 0 /100 WBCS
PLATELET # BLD AUTO: 171 THOUSANDS/UL (ref 149–390)
PMV BLD AUTO: 11.2 FL (ref 8.9–12.7)
POTASSIUM SERPL-SCNC: 4 MMOL/L (ref 3.5–5.3)
PROT SERPL-MCNC: 7.2 G/DL (ref 6.4–8.4)
PSA SERPL-MCNC: 0.9 NG/ML (ref 0–4)
RBC # BLD AUTO: 5.05 MILLION/UL (ref 3.88–5.62)
SODIUM SERPL-SCNC: 142 MMOL/L (ref 135–147)
TRIGL SERPL-MCNC: 126 MG/DL
WBC # BLD AUTO: 9.01 THOUSAND/UL (ref 4.31–10.16)

## 2024-07-30 PROCEDURE — 80061 LIPID PANEL: CPT

## 2024-07-30 PROCEDURE — G0103 PSA SCREENING: HCPCS

## 2024-07-30 PROCEDURE — 82043 UR ALBUMIN QUANTITATIVE: CPT

## 2024-07-30 PROCEDURE — 82570 ASSAY OF URINE CREATININE: CPT

## 2024-07-30 PROCEDURE — 83036 HEMOGLOBIN GLYCOSYLATED A1C: CPT

## 2024-07-30 PROCEDURE — 80053 COMPREHEN METABOLIC PANEL: CPT

## 2024-07-30 PROCEDURE — 36415 COLL VENOUS BLD VENIPUNCTURE: CPT

## 2024-07-30 PROCEDURE — 74018 RADEX ABDOMEN 1 VIEW: CPT

## 2024-07-30 PROCEDURE — 85025 COMPLETE CBC W/AUTO DIFF WBC: CPT

## 2024-07-30 NOTE — TELEPHONE ENCOUNTER
----- Message from Gasper Gaspar MD sent at 7/30/2024  1:49 PM EDT -----  Call patient, make appointment for checkup and to review recent labs.  He is due for annual wellness visit.  ----- Message -----  From: Lab, Background User  Sent: 7/30/2024   8:37 AM EDT  To: Gasper Gaspar MD

## 2024-07-30 NOTE — TELEPHONE ENCOUNTER
Patient has an appt set for November although if he needs an appt before that please call to reschedule because Dr Gaspar had nothing sooner

## 2024-09-26 DIAGNOSIS — J43.2 CENTRILOBULAR EMPHYSEMA (HCC): ICD-10-CM

## 2024-09-26 RX ORDER — UMECLIDINIUM BROMIDE AND VILANTEROL TRIFENATATE 62.5; 25 UG/1; UG/1
1 POWDER RESPIRATORY (INHALATION) DAILY
Qty: 60 EACH | Refills: 1 | Status: SHIPPED | OUTPATIENT
Start: 2024-09-26

## 2024-09-26 NOTE — TELEPHONE ENCOUNTER
Reason for call:   [x] Refill   [] Prior Auth  [] Other:     Office:   [] PCP/Provider -   [x] Specialty/Provider - Pulm    Medication: Anoro Ellipta    Dose/Frequency: 62.5-25 mcg    Quantity: 60    Pharmacy:   [facility]/pharmacy #[id number] - [place], [place] - [address]  [address], [place] [address]  Phone: [phone]  Fax: [phone]  GABI #: [id number]     Does the patient have enough for 3 days?   [] Yes   [x] No - Send as HP to POD

## 2024-09-27 ENCOUNTER — TELEPHONE (OUTPATIENT)
Age: 84
End: 2024-09-27

## 2024-09-27 NOTE — TELEPHONE ENCOUNTER
Pt stated Pulm Provider:  MYLA Gao    Actionable item: Review notation and advise further on L. Nares injury.    What is the reason for the call/chief complaint?  L. Nare blocked, not able to breath through it. States sharpening mower blade and spark flew into L. Nare, since then nare has been blocked and not able to breath through it.     Disposition: Advised to call PCP for appt to eval and possible referral to ENT. Advised I will also alert PCP that he needs to.

## 2024-10-01 ENCOUNTER — OFFICE VISIT (OUTPATIENT)
Dept: FAMILY MEDICINE CLINIC | Facility: CLINIC | Age: 84
End: 2024-10-01
Payer: COMMERCIAL

## 2024-10-01 VITALS
BODY MASS INDEX: 24.14 KG/M2 | OXYGEN SATURATION: 98 % | WEIGHT: 163 LBS | DIASTOLIC BLOOD PRESSURE: 92 MMHG | SYSTOLIC BLOOD PRESSURE: 152 MMHG | TEMPERATURE: 97.8 F | HEART RATE: 117 BPM | HEIGHT: 69 IN

## 2024-10-01 DIAGNOSIS — J96.11 CHRONIC HYPOXEMIC RESPIRATORY FAILURE (HCC): ICD-10-CM

## 2024-10-01 DIAGNOSIS — J34.2 DEVIATED SEPTUM: ICD-10-CM

## 2024-10-01 DIAGNOSIS — N18.31 STAGE 3A CHRONIC KIDNEY DISEASE (HCC): ICD-10-CM

## 2024-10-01 DIAGNOSIS — Z00.00 MEDICARE ANNUAL WELLNESS VISIT, SUBSEQUENT: Primary | ICD-10-CM

## 2024-10-01 DIAGNOSIS — R73.03 PREDIABETES: ICD-10-CM

## 2024-10-01 DIAGNOSIS — R09.81 SINUS CONGESTION: ICD-10-CM

## 2024-10-01 DIAGNOSIS — E78.5 HYPERLIPIDEMIA, UNSPECIFIED HYPERLIPIDEMIA TYPE: ICD-10-CM

## 2024-10-01 DIAGNOSIS — J43.2 CENTRILOBULAR EMPHYSEMA (HCC): ICD-10-CM

## 2024-10-01 DIAGNOSIS — K21.9 GASTROESOPHAGEAL REFLUX DISEASE, UNSPECIFIED WHETHER ESOPHAGITIS PRESENT: ICD-10-CM

## 2024-10-01 PROBLEM — E11.22 TYPE 2 DIABETES MELLITUS WITH STAGE 3A CHRONIC KIDNEY DISEASE, WITHOUT LONG-TERM CURRENT USE OF INSULIN (HCC): Status: RESOLVED | Noted: 2023-10-09 | Resolved: 2024-10-01

## 2024-10-01 PROCEDURE — G0439 PPPS, SUBSEQ VISIT: HCPCS | Performed by: FAMILY MEDICINE

## 2024-10-01 PROCEDURE — 99214 OFFICE O/P EST MOD 30 MIN: CPT | Performed by: FAMILY MEDICINE

## 2024-10-01 NOTE — PROGRESS NOTES
Ambulatory Visit  Name: Kei Roth      : 1940      MRN: [id number]  Encounter Provider: Gasper Gaspar MD  Encounter Date: 10/1/2024   Encounter department: [facility] [address]  Return visit in 6 months with fasting blood prior to visit  Assessment & Plan  Medicare annual wellness visit, subsequent         Centrilobular emphysema (HCC)  Continue Anoro Ellipta and albuterol inhalers       Chronic hypoxemic respiratory failure (HCC)  Continue nasal O2 2 L/min       Gastroesophageal reflux disease, unspecified whether esophagitis present  Continue Prilosec 20 mg daily       Prediabetes    Orders:    Hemoglobin A1C; Future    Stage 3a chronic kidney disease (HCC)  Lab Results   Component Value Date    EGFR 61 2024    EGFR 47 2023    EGFR 53 2023    CREATININE 1.12 2024    CREATININE 1.39 (H) 2023    CREATININE 1.26 2023            Hyperlipidemia, unspecified hyperlipidemia type  Continue Crestor 20 mg daily  Orders:    CBC and differential; Future    Comprehensive metabolic panel; Future    Lipid panel; Future    Sinus congestion  Trial of Flonase no spray at night and saline nose rinse in the morning.         Deviated septum  If not improved with Flonase ENT evaluation recommended            Preventive health issues were discussed with patient, and age appropriate screening tests were ordered as noted in patient's After Visit Summary. Personalized health advice and appropriate referrals for health education or preventive services given if needed, as noted in patient's After Visit Summary.    History of Present Illness     Patient comes in for checkup.  He complains of chronic sinus congestion left nares.  He is on oxygen 24 hours a day for COPD and chronic respiratory failure.       Patient Care Team:  Gasper Gaspar MD as PCP - General  Karla Garcia PA-C as Physician Assistant (Physician Assistant)  Sigrid Nava PA-C  (Gastroenterology)    Review of Systems   HENT:  Positive for congestion.    Respiratory:  Positive for shortness of breath and wheezing.    Cardiovascular: Negative.    Gastrointestinal: Negative.      Medical History Reviewed by provider this encounter:  Tobacco  Allergies  Meds  Problems  Med Hx  Surg Hx  Fam Hx       Annual Wellness Visit Questionnaire   Kei is here for his Subsequent Wellness visit. Last Medicare Wellness visit information reviewed, patient interviewed, no change since last AWV.     Health Risk Assessment:   Patient rates overall health as fair. Patient feels that their physical health rating is same. Patient is satisfied with their life. Eyesight was rated as slightly worse. Hearing was rated as slightly worse. Patient feels that their emotional and mental health rating is same. Patients states they are sometimes angry. Patient states they are always unusually tired/fatigued. Pain experienced in the last 7 days has been some. Patient's pain rating has been 5/10. Patient states that he has experienced no weight loss or gain in last 6 months.     Depression Screening:   PHQ-9 Score: 0      Fall Risk Screening:   In the past year, patient has experienced: no history of falling in past year      Home Safety:  Patient has trouble with stairs inside or outside of their home. Patient has working smoke alarms and has working carbon monoxide detector. Home safety hazards include: none.     Nutrition:   Current diet is Regular.     Medications:   Patient is currently taking over-the-counter supplements. OTC medications include: see medication list. Patient is able to manage medications.     Activities of Daily Living (ADLs)/Instrumental Activities of Daily Living (IADLs):   Walk and transfer into and out of bed and chair?: Yes  Dress and groom yourself?: Yes    Bathe or shower yourself?: Yes    Feed yourself? Yes  Do your laundry/housekeeping?: Yes  Manage your money, pay your bills and track  your expenses?: Yes  Make your own meals?: Yes    Do your own shopping?: No    Previous Hospitalizations:   Any hospitalizations or ED visits within the last 12 months?: No      Advance Care Planning:   Living will: Yes    Durable POA for healthcare: No    Advanced directive: Yes      PREVENTIVE SCREENINGS      Cardiovascular Screening:    General: Screening Not Indicated, History Lipid Disorder and Screening Current      Diabetes Screening:     General: Screening Not Indicated, History Diabetes and Screening Current      Colorectal Cancer Screening:     General: Screening Not Indicated      Prostate Cancer Screening:    General: Screening Not Indicated      Abdominal Aortic Aneurysm (AAA) Screening:    Risk factors include: tobacco use        General: Screening Not Indicated, History AAA and Screening Current      Lung Cancer Screening:     General: Screening Not Indicated    Screening, Brief Intervention, and Referral to Treatment (SBIRT)    Screening  Typical number of drinks in a day: 1  Typical number of drinks in a week: 7  Interpretation: Low risk drinking behavior.    Single Item Drug Screening:  How often have you used an illegal drug (including marijuana) or a prescription medication for non-medical reasons in the past year? never    Single Item Drug Screen Score: 0  Interpretation: Negative screen for possible drug use disorder    Social Determinants of Health     Financial Resource Strain: Low Risk  (3/9/2023)    Overall Financial Resource Strain (CARDIA)     Difficulty of Paying Living Expenses: Not hard at all   Food Insecurity: No Food Insecurity (10/1/2024)    Hunger Vital Sign     Worried About Running Out of Food in the Last Year: Never true     Ran Out of Food in the Last Year: Never true   Transportation Needs: No Transportation Needs (10/1/2024)    PRAPARE - Transportation     Lack of Transportation (Medical): No     Lack of Transportation (Non-Medical): No   Housing Stability: Low Risk   "(10/1/2024)    Housing Stability Vital Sign     Unable to Pay for Housing in the Last Year: No     Number of Times Moved in the Last Year: 0     Homeless in the Last Year: No   Utilities: Not At Risk (10/1/2024)    [facility] Utilities     Threatened with loss of utilities: No     No results found.    Objective     /92   Pulse (!) 117   Temp 97.8 °F (36.6 °C)   Ht 5' 9\" (1.753 m)   Wt 73.9 kg (163 lb)   SpO2 98%   BMI 24.07 kg/m²     Physical Exam  Constitutional:       Appearance: Normal appearance. He is well-developed.   HENT:      Head: Normocephalic and atraumatic.      Nose: Congestion present.      Comments: Nasal septum and occluding the left nares  Eyes:      Pupils: Pupils are equal, round, and reactive to light.   Cardiovascular:      Rate and Rhythm: Normal rate and regular rhythm.      Heart sounds: Normal heart sounds.   Pulmonary:      Effort: Pulmonary effort is normal.      Breath sounds: Normal breath sounds.   Abdominal:      General: Bowel sounds are normal.      Palpations: Abdomen is soft.   Musculoskeletal:         General: Normal range of motion.      Cervical back: Neck supple.   Skin:     General: Skin is warm and dry.   Neurological:      Mental Status: He is alert.   Psychiatric:         Mood and Affect: Mood normal.         Behavior: Behavior normal.         "

## 2024-10-01 NOTE — ASSESSMENT & PLAN NOTE
Continue Crestor 20 mg daily  Orders:    CBC and differential; Future    Comprehensive metabolic panel; Future    Lipid panel; Future

## 2024-10-01 NOTE — PATIENT INSTRUCTIONS
Medicare Preventive Visit Patient Instructions  Thank you for completing your Welcome to Medicare Visit or Medicare Annual Wellness Visit today. Your next wellness visit will be due in one year (10/2/2025).  The screening/preventive services that you may require over the next 5-10 years are detailed below. Some tests may not apply to you based off risk factors and/or age. Screening tests ordered at today's visit but not completed yet may show as past due. Also, please note that scanned in results may not display below.  Preventive Screenings:  Service Recommendations Previous Testing/Comments   Colorectal Cancer Screening  Colonoscopy    Fecal Occult Blood Test (FOBT)/Fecal Immunochemical Test (FIT)  Fecal DNA/Cologuard Test  Flexible Sigmoidoscopy Age: 45-75 years old   Colonoscopy: every 10 years (May be performed more frequently if at higher risk)  OR  FOBT/FIT: every 1 year  OR  Cologuard: every 3 years  OR  Sigmoidoscopy: every 5 years  Screening may be recommended earlier than age 45 if at higher risk for colorectal cancer. Also, an individualized decision between you and your healthcare provider will decide whether screening between the ages of 76-85 would be appropriate. Colonoscopy: 03/09/2011  FOBT/FIT: Not on file  Cologuard: Not on file  Sigmoidoscopy: Not on file          Prostate Cancer Screening Individualized decision between patient and health care provider in men between ages of 55-69   Medicare will cover every 12 months beginning on the day after your 50th birthday PSA: 0.904 ng/mL     Screening Not Indicated     Hepatitis C Screening Once for adults born between 1945 and 1965  More frequently in patients at high risk for Hepatitis C Hep C Antibody: Not on file        Diabetes Screening 1-2 times per year if you're at risk for diabetes or have pre-diabetes Fasting glucose: 132 mg/dL (7/30/2024)  A1C: 5.9 % (7/30/2024)  Screening Not Indicated  History Diabetes   Cholesterol Screening Once every 5  years if you don't have a lipid disorder. May order more often based on risk factors. Lipid panel: 07/30/2024  Screening Not Indicated  History Lipid Disorder      Other Preventive Screenings Covered by Medicare:  Abdominal Aortic Aneurysm (AAA) Screening: covered once if your at risk. You're considered to be at risk if you have a family history of AAA or a male between the age of 65-75 who smoking at least 100 cigarettes in your lifetime.  Lung Cancer Screening: covers low dose CT scan once per year if you meet all of the following conditions: (1) Age 55-77; (2) No signs or symptoms of lung cancer; (3) Current smoker or have quit smoking within the last 15 years; (4) You have a tobacco smoking history of at least 20 pack years (packs per day x number of years you smoked); (5) You get a written order from a healthcare provider.  Glaucoma Screening: covered annually if you're considered high risk: (1) You have diabetes OR (2) Family history of glaucoma OR (3)  aged 50 and older OR (4)  American aged 65 and older  Osteoporosis Screening: covered every 2 years if you meet one of the following conditions: (1) Have a vertebral abnormality; (2) On glucocorticoid therapy for more than 3 months; (3) Have primary hyperparathyroidism; (4) On osteoporosis medications and need to assess response to drug therapy.  HIV Screening: covered annually if you're between the age of 15-65. Also covered annually if you are younger than 15 and older than 65 with risk factors for HIV infection. For pregnant patients, it is covered up to 3 times per pregnancy.    Immunizations:  Immunization Recommendations   Influenza Vaccine Annual influenza vaccination during flu season is recommended for all persons aged >= 6 months who do not have contraindications   Pneumococcal Vaccine   * Pneumococcal conjugate vaccine = PCV13 (Prevnar 13), PCV15 (Vaxneuvance), PCV20 (Prevnar 20)  * Pneumococcal polysaccharide vaccine = PPSV23  (Pneumovax) Adults 19-63 yo with certain risk factors or if 65+ yo  If never received any pneumonia vaccine: recommend Prevnar 20 (PCV20)  Give PCV20 if previously received 1 dose of PCV13 or PPSV23   Hepatitis B Vaccine 3 dose series if at intermediate or high risk (ex: diabetes, end stage renal disease, liver disease)   Respiratory syncytial virus (RSV) Vaccine - COVERED BY MEDICARE PART D  * RSVPreF3 (Arexvy) CDC recommends that adults 60 years of age and older may receive a single dose of RSV vaccine using shared clinical decision-making (SCDM)   Tetanus (Td) Vaccine - COST NOT COVERED BY MEDICARE PART B Following completion of primary series, a booster dose should be given every 10 years to maintain immunity against tetanus. Td may also be given as tetanus wound prophylaxis.   Tdap Vaccine - COST NOT COVERED BY MEDICARE PART B Recommended at least once for all adults. For pregnant patients, recommended with each pregnancy.   Shingles Vaccine (Shingrix) - COST NOT COVERED BY MEDICARE PART B  2 shot series recommended in those 19 years and older who have or will have weakened immune systems or those 50 years and older     Health Maintenance Due:      Topic Date Due   • Lung Cancer Screening  Discontinued     Immunizations Due:      Topic Date Due   • Influenza Vaccine (1) 09/01/2024   • COVID-19 Vaccine (5 - 2023-24 season) 09/01/2024     Advance Directives   What are advance directives?  Advance directives are legal documents that state your wishes and plans for medical care. These plans are made ahead of time in case you lose your ability to make decisions for yourself. Advance directives can apply to any medical decision, such as the treatments you want, and if you want to donate organs.   What are the types of advance directives?  There are many types of advance directives, and each state has rules about how to use them. You may choose a combination of any of the following:  Living will:  This is a written  record of the treatment you want. You can also choose which treatments you do not want, which to limit, and which to stop at a certain time. This includes surgery, medicine, IV fluid, and tube feedings.   Durable power of  for healthcare (DPAHC):  This is a written record that states who you want to make healthcare choices for you when you are unable to make them for yourself. This person, called a proxy, is usually a family member or a friend. You may choose more than 1 proxy.  Do not resuscitate (DNR) order:  A DNR order is used in case your heart stops beating or you stop breathing. It is a request not to have certain forms of treatment, such as CPR. A DNR order may be included in other types of advance directives.  Medical directive:  This covers the care that you want if you are in a coma, near death, or unable to make decisions for yourself. You can list the treatments you want for each condition. Treatment may include pain medicine, surgery, blood transfusions, dialysis, IV or tube feedings, and a ventilator (breathing machine).  Values history:  This document has questions about your views, beliefs, and how you feel and think about life. This information can help others choose the care that you would choose.  Why are advance directives important?  An advance directive helps you control your care. Although spoken wishes may be used, it is better to have your wishes written down. Spoken wishes can be misunderstood, or not followed. Treatments may be given even if you do not want them. An advance directive may make it easier for your family to make difficult choices about your care.       © Copyright [employer] 2018 Information is for End User's use only and may not be sold, redistributed or otherwise used for commercial purposes. All illustrations and images included in CareNotes® are the copyrighted property of [employer] or [employer]    Medicare Preventive Visit Patient  Instructions  Thank you for completing your Welcome to Medicare Visit or Medicare Annual Wellness Visit today. Your next wellness visit will be due in one year (10/2/2025).  The screening/preventive services that you may require over the next 5-10 years are detailed below. Some tests may not apply to you based off risk factors and/or age. Screening tests ordered at today's visit but not completed yet may show as past due. Also, please note that scanned in results may not display below.  Preventive Screenings:  Service Recommendations Previous Testing/Comments   Colorectal Cancer Screening  Colonoscopy    Fecal Occult Blood Test (FOBT)/Fecal Immunochemical Test (FIT)  Fecal DNA/Cologuard Test  Flexible Sigmoidoscopy Age: 45-75 years old   Colonoscopy: every 10 years (May be performed more frequently if at higher risk)  OR  FOBT/FIT: every 1 year  OR  Cologuard: every 3 years  OR  Sigmoidoscopy: every 5 years  Screening may be recommended earlier than age 45 if at higher risk for colorectal cancer. Also, an individualized decision between you and your healthcare provider will decide whether screening between the ages of 76-85 would be appropriate. Colonoscopy: 03/09/2011  FOBT/FIT: Not on file  Cologuard: Not on file  Sigmoidoscopy: Not on file          Prostate Cancer Screening Individualized decision between patient and health care provider in men between ages of 55-69   Medicare will cover every 12 months beginning on the day after your 50th birthday PSA: 0.904 ng/mL     Screening Not Indicated     Hepatitis C Screening Once for adults born between 1945 and 1965  More frequently in patients at high risk for Hepatitis C Hep C Antibody: Not on file        Diabetes Screening 1-2 times per year if you're at risk for diabetes or have pre-diabetes Fasting glucose: 132 mg/dL (7/30/2024)  A1C: 5.9 % (7/30/2024)  Screening Not Indicated  History Diabetes   Cholesterol Screening Once every 5 years if you don't have a lipid  disorder. May order more often based on risk factors. Lipid panel: 07/30/2024  Screening Not Indicated  History Lipid Disorder      Other Preventive Screenings Covered by Medicare:  Abdominal Aortic Aneurysm (AAA) Screening: covered once if your at risk. You're considered to be at risk if you have a family history of AAA or a male between the age of 65-75 who smoking at least 100 cigarettes in your lifetime.  Lung Cancer Screening: covers low dose CT scan once per year if you meet all of the following conditions: (1) Age 55-77; (2) No signs or symptoms of lung cancer; (3) Current smoker or have quit smoking within the last 15 years; (4) You have a tobacco smoking history of at least 20 pack years (packs per day x number of years you smoked); (5) You get a written order from a healthcare provider.  Glaucoma Screening: covered annually if you're considered high risk: (1) You have diabetes OR (2) Family history of glaucoma OR (3)  aged 50 and older OR (4)  American aged 65 and older  Osteoporosis Screening: covered every 2 years if you meet one of the following conditions: (1) Have a vertebral abnormality; (2) On glucocorticoid therapy for more than 3 months; (3) Have primary hyperparathyroidism; (4) On osteoporosis medications and need to assess response to drug therapy.  HIV Screening: covered annually if you're between the age of 15-65. Also covered annually if you are younger than 15 and older than 65 with risk factors for HIV infection. For pregnant patients, it is covered up to 3 times per pregnancy.    Immunizations:  Immunization Recommendations   Influenza Vaccine Annual influenza vaccination during flu season is recommended for all persons aged >= 6 months who do not have contraindications   Pneumococcal Vaccine   * Pneumococcal conjugate vaccine = PCV13 (Prevnar 13), PCV15 (Vaxneuvance), PCV20 (Prevnar 20)  * Pneumococcal polysaccharide vaccine = PPSV23 (Pneumovax) Adults 19-63 yo  with certain risk factors or if 65+ yo  If never received any pneumonia vaccine: recommend Prevnar 20 (PCV20)  Give PCV20 if previously received 1 dose of PCV13 or PPSV23   Hepatitis B Vaccine 3 dose series if at intermediate or high risk (ex: diabetes, end stage renal disease, liver disease)   Respiratory syncytial virus (RSV) Vaccine - COVERED BY MEDICARE PART D  * RSVPreF3 (Arexvy) CDC recommends that adults 60 years of age and older may receive a single dose of RSV vaccine using shared clinical decision-making (SCDM)   Tetanus (Td) Vaccine - COST NOT COVERED BY MEDICARE PART B Following completion of primary series, a booster dose should be given every 10 years to maintain immunity against tetanus. Td may also be given as tetanus wound prophylaxis.   Tdap Vaccine - COST NOT COVERED BY MEDICARE PART B Recommended at least once for all adults. For pregnant patients, recommended with each pregnancy.   Shingles Vaccine (Shingrix) - COST NOT COVERED BY MEDICARE PART B  2 shot series recommended in those 19 years and older who have or will have weakened immune systems or those 50 years and older     Health Maintenance Due:      Topic Date Due   • Lung Cancer Screening  Discontinued     Immunizations Due:      Topic Date Due   • Influenza Vaccine (1) 09/01/2024   • COVID-19 Vaccine (5 - 2023-24 season) 09/01/2024     Advance Directives   What are advance directives?  Advance directives are legal documents that state your wishes and plans for medical care. These plans are made ahead of time in case you lose your ability to make decisions for yourself. Advance directives can apply to any medical decision, such as the treatments you want, and if you want to donate organs.   What are the types of advance directives?  There are many types of advance directives, and each state has rules about how to use them. You may choose a combination of any of the following:  Living will:  This is a written record of the treatment you  want. You can also choose which treatments you do not want, which to limit, and which to stop at a certain time. This includes surgery, medicine, IV fluid, and tube feedings.   Durable power of  for healthcare (DPAHC):  This is a written record that states who you want to make healthcare choices for you when you are unable to make them for yourself. This person, called a proxy, is usually a family member or a friend. You may choose more than 1 proxy.  Do not resuscitate (DNR) order:  A DNR order is used in case your heart stops beating or you stop breathing. It is a request not to have certain forms of treatment, such as CPR. A DNR order may be included in other types of advance directives.  Medical directive:  This covers the care that you want if you are in a coma, near death, or unable to make decisions for yourself. You can list the treatments you want for each condition. Treatment may include pain medicine, surgery, blood transfusions, dialysis, IV or tube feedings, and a ventilator (breathing machine).  Values history:  This document has questions about your views, beliefs, and how you feel and think about life. This information can help others choose the care that you would choose.  Why are advance directives important?  An advance directive helps you control your care. Although spoken wishes may be used, it is better to have your wishes written down. Spoken wishes can be misunderstood, or not followed. Treatments may be given even if you do not want them. An advance directive may make it easier for your family to make difficult choices about your care.       © Copyright [employer] 2018 Information is for End User's use only and may not be sold, redistributed or otherwise used for commercial purposes. All illustrations and images included in CareNotes® are the copyrighted property of [employer] or [employer]

## 2024-10-01 NOTE — ASSESSMENT & PLAN NOTE
Lab Results   Component Value Date    EGFR 61 07/30/2024    EGFR 47 09/05/2023    EGFR 53 08/25/2023    CREATININE 1.12 07/30/2024    CREATININE 1.39 (H) 09/05/2023    CREATININE 1.26 08/25/2023

## 2024-10-02 ENCOUNTER — TELEPHONE (OUTPATIENT)
Dept: ADMINISTRATIVE | Facility: OTHER | Age: 84
End: 2024-10-02

## 2024-10-02 NOTE — LETTER
Diabetic Eye Exam Form    Date Requested: 10/08/24  Patient: Kei Roth  Patient : 1940   Referring Provider: Gasper Gaspar MD      DIABETIC Eye Exam Date _______________________________      Type of Exam MUST be documented for Diabetic Eye Exams. Please CHECK ONE.     Retinal Exam       Dilated Retinal Exam       OCT       Optomap-Iris Exam      Fundus Photography       Left Eye - Please check Retinopathy or No Retinopathy        Exam did show retinopathy    Exam did not show retinopathy       Right Eye - Please check Retinopathy or No Retinopathy       Exam did show retinopathy    Exam did not show retinopathy       Comments __________________________________________________________    Practice Providing Exam ______________________________________________    Exam Performed By (print name) _______________________________________      Provider Signature ___________________________________________________      These reports are needed for  compliance.  Please fax this completed form and a copy of the Diabetic Eye Exam report to our office located at [address] [place] [address] as soon as possible via Fax [phone] reid Martinez: Phone [phone]  We thank you for your assistance in treating our mutual patient.

## 2024-10-02 NOTE — TELEPHONE ENCOUNTER
Upon review of the In Basket request and the patient's chart, initial outreach has been made via fax to facility. Please see Contacts section for details.     Thank you  Michelle Godfrey MA

## 2024-10-02 NOTE — TELEPHONE ENCOUNTER
----- Message from Skye RODRIGUEZ sent at 10/1/2024  1:44 PM EDT -----  Regarding: care gap request dm eye exam  10/01/24 1:44 PM    Hello, our patient attached above has had Diabetic Eye Exam completed/performed. Please assist in updating the patient chart by making an External outreach to [facility] facility located in [place]. The date of service is 2-3 months.    Thank you,  Skye ALVAREZ [address] [place] [address]

## 2024-10-02 NOTE — LETTER
Diabetic Eye Exam Form    Date Requested: 10/02/24  Patient: Kei Roth  Patient : 1940   Referring Provider: Gasper Gaspar MD      DIABETIC Eye Exam Date _______________________________      Type of Exam MUST be documented for Diabetic Eye Exams. Please CHECK ONE.     Retinal Exam       Dilated Retinal Exam       OCT       Optomap-Iris Exam      Fundus Photography       Left Eye - Please check Retinopathy or No Retinopathy        Exam did show retinopathy    Exam did not show retinopathy       Right Eye - Please check Retinopathy or No Retinopathy       Exam did show retinopathy    Exam did not show retinopathy       Comments __________________________________________________________    Practice Providing Exam ______________________________________________    Exam Performed By (print name) _______________________________________      Provider Signature ___________________________________________________      These reports are needed for  compliance.  Please fax this completed form and a copy of the Diabetic Eye Exam report to our office located at [address] [place] [address] as soon as possible via Fax [phone] reid Martinez: Phone [phone]  We thank you for your assistance in treating our mutual patient.

## 2024-10-07 DIAGNOSIS — K21.9 GASTROESOPHAGEAL REFLUX DISEASE, UNSPECIFIED WHETHER ESOPHAGITIS PRESENT: ICD-10-CM

## 2024-10-08 NOTE — TELEPHONE ENCOUNTER
As a follow-up, a second attempt has been made for outreach via fax to facility. Please see Contacts section for details.    Thank you  Michelle Godfrey MA

## 2024-10-10 DIAGNOSIS — K21.9 GASTROESOPHAGEAL REFLUX DISEASE, UNSPECIFIED WHETHER ESOPHAGITIS PRESENT: ICD-10-CM

## 2024-10-10 NOTE — TELEPHONE ENCOUNTER
Patient's granddaughter called to request an increase in patient's omeprazole. She states he has been experiencing more issues of discomfort and burping. Requests call if appointment needed to patients daughter:  Cristina at [phone].

## 2024-10-11 ENCOUNTER — TELEPHONE (OUTPATIENT)
Dept: FAMILY MEDICINE CLINIC | Facility: CLINIC | Age: 84
End: 2024-10-11

## 2024-10-11 NOTE — TELEPHONE ENCOUNTER
Spoke with Cristina, She is requesting another medication can be sent in instead of Omeprazole.    Please review.

## 2024-10-14 NOTE — TELEPHONE ENCOUNTER
As a final attempt, a third outreach has been made via telephone call to facility. Please see Contacts section for details. This encounter will be closed and completed by end of day. Should we receive the requested information because of previous outreach attempts, the requested patient's chart will be updated appropriately.     Thank you  Michelle Godfrey MA

## 2024-10-16 NOTE — TELEPHONE ENCOUNTER
Upon review of the In Basket request we have found/obtained the documentation. After careful review of the document we are unable to complete this request for Diabetic Eye Exam because the documentation does not have the proper verbiage (wording) needed to close the requested care gap(s).    Any additional questions or concerns should be emailed to the Practice Liaisons via the appropriate education email address, please do not reply via In Basket.    Thank you  Michelle Godfrey MA   [facility]

## 2024-10-21 DIAGNOSIS — E78.5 HYPERLIPIDEMIA, UNSPECIFIED HYPERLIPIDEMIA TYPE: ICD-10-CM

## 2024-10-21 RX ORDER — ROSUVASTATIN CALCIUM 20 MG/1
20 TABLET, COATED ORAL DAILY
Qty: 90 TABLET | Refills: 1 | Status: SHIPPED | OUTPATIENT
Start: 2024-10-21

## 2024-11-16 DIAGNOSIS — J44.9 CHRONIC OBSTRUCTIVE PULMONARY DISEASE, UNSPECIFIED COPD TYPE (HCC): ICD-10-CM

## 2024-11-18 RX ORDER — ALBUTEROL SULFATE 90 UG/1
AEROSOL, METERED RESPIRATORY (INHALATION)
Qty: 18 G | Refills: 1 | Status: SHIPPED | OUTPATIENT
Start: 2024-11-18

## 2024-11-29 DIAGNOSIS — R09.81 HEAD CONGESTION: ICD-10-CM

## 2024-11-29 DIAGNOSIS — J43.2 CENTRILOBULAR EMPHYSEMA (HCC): ICD-10-CM

## 2024-11-29 RX ORDER — UMECLIDINIUM BROMIDE AND VILANTEROL TRIFENATATE 62.5; 25 UG/1; UG/1
1 POWDER RESPIRATORY (INHALATION) DAILY
Qty: 60 EACH | Refills: 1 | Status: SHIPPED | OUTPATIENT
Start: 2024-11-29

## 2024-11-29 RX ORDER — FLUTICASONE PROPIONATE 50 MCG
SPRAY, SUSPENSION (ML) NASAL
Qty: 48 ML | Refills: 3 | Status: SHIPPED | OUTPATIENT
Start: 2024-11-29

## 2024-11-29 NOTE — TELEPHONE ENCOUNTER
Reason for call:   [x] Refill   [] Prior Auth  [] Other:     Office:   [] PCP/Provider -   [x] Specialty/Provider -Nafisa Shelley/[facility]          Medication: Anoro Ellipta    Dose/Frequency: 62.5-25 mcg inhaler    Quantity: #60    Pharmacy: [address]    Does the patient have enough for 3 days?   [] Yes   [x] No - Send as HP to POD

## 2024-12-30 ENCOUNTER — OFFICE VISIT (OUTPATIENT)
Age: 84
End: 2024-12-30
Payer: COMMERCIAL

## 2024-12-30 VITALS
RESPIRATION RATE: 18 BRPM | TEMPERATURE: 98.5 F | HEART RATE: 79 BPM | DIASTOLIC BLOOD PRESSURE: 82 MMHG | HEIGHT: 69 IN | SYSTOLIC BLOOD PRESSURE: 139 MMHG | OXYGEN SATURATION: 99 % | BODY MASS INDEX: 24.14 KG/M2 | WEIGHT: 163 LBS

## 2024-12-30 DIAGNOSIS — J96.11 CHRONIC HYPOXEMIC RESPIRATORY FAILURE (HCC): ICD-10-CM

## 2024-12-30 DIAGNOSIS — J43.2 CENTRILOBULAR EMPHYSEMA (HCC): ICD-10-CM

## 2024-12-30 DIAGNOSIS — J44.9 CHRONIC OBSTRUCTIVE PULMONARY DISEASE, UNSPECIFIED COPD TYPE (HCC): Primary | ICD-10-CM

## 2024-12-30 PROCEDURE — 99214 OFFICE O/P EST MOD 30 MIN: CPT

## 2024-12-30 RX ORDER — FLUTICASONE FUROATE, UMECLIDINIUM BROMIDE AND VILANTEROL TRIFENATATE 200; 62.5; 25 UG/1; UG/1; UG/1
1 POWDER RESPIRATORY (INHALATION) DAILY
Qty: 60 BLISTER | Refills: 0 | Status: SHIPPED | OUTPATIENT
Start: 2024-12-30 | End: 2025-01-06

## 2024-12-30 NOTE — ASSESSMENT & PLAN NOTE
-PFTs in 2021 with severe obstruction, FEV1 31% and severely reduced diffusion capacity 30%  -Patient has severe lower lobe emphysema and mild upper lobe emphysema  -He has limited activity tolerance due to his breathing  -Currently on Anoro, using albuterol up to 4 times daily.  Was previously on all nebulized regimen and states he did not tolerate this  -Has elevated eosinophils, most recently 660 cells  -Lungs are clear but diminished throughout.  Does not appear to be in acute exacerbation    Plan:  -Escalate inhaler therapy to Trelegy 200 daily.  Samples of this drug were given to the patient, quantity 1, Lot Number [id number]. The following was discussed with the patient as indicated: administration, storage, potential interactions, side effects.   -Continue albuterol HFA/nebs every 6 hours as needed for shortness of breath or wheezing  -Discussed with patient, may benefit from BLVR.  Will update PFTs to see if patient meets preliminary requirements  -Recommend pulmonary rehab, referral placed  -Palliative care referral placed to help with symptom management  -Follow-up in 2 months or sooner if needed

## 2024-12-30 NOTE — ASSESSMENT & PLAN NOTE
-Previously prescribed 2 L supplemental oxygen, uses 3-4 L at home.  Will update walk test with PFTs  -Continue supplemental oxygen to maintain SpO2 above 88%

## 2024-12-30 NOTE — PROGRESS NOTES
Pulmonary Follow Up Note  Kei Roth 84 y.o. male MRN: [id number]  12/30/2024    Assessment:    Chronic obstructive pulmonary disease (HCC)  -PFTs in 2021 with severe obstruction, FEV1 31% and severely reduced diffusion capacity 30%  -Patient has severe lower lobe emphysema and mild upper lobe emphysema  -He has limited activity tolerance due to his breathing  -Currently on Anoro, using albuterol up to 4 times daily.  Was previously on all nebulized regimen and states he did not tolerate this  -Has elevated eosinophils, most recently 660 cells  -Lungs are clear but diminished throughout.  Does not appear to be in acute exacerbation    Plan:  -Escalate inhaler therapy to Trelegy 200 daily.  Samples of this drug were given to the patient, quantity 1, Lot Number [id number]. The following was discussed with the patient as indicated: administration, storage, potential interactions, side effects.   -Continue albuterol HFA/nebs every 6 hours as needed for shortness of breath or wheezing  -Discussed with patient, may benefit from BLVR.  Will update PFTs to see if patient meets preliminary requirements  -Recommend pulmonary rehab, referral placed  -Palliative care referral placed to help with symptom management  -Follow-up in 2 months or sooner if needed      Chronic hypoxemic respiratory failure (HCC)  -Previously prescribed 2 L supplemental oxygen, uses 3-4 L at home.  Will update walk test with PFTs  -Continue supplemental oxygen to maintain SpO2 above 88%  \    Plan:    Diagnoses and all orders for this visit:    Chronic obstructive pulmonary disease, unspecified COPD type (HCC)  -     Complete PFT with post Bronchodilator and Six Minute walk; Future  -     Ambulatory Referral to Palliative Care; Future  -     Ambulatory Referral to Pulmonary Rehabilitation; Future  -     fluticasone-umeclidinium-vilanterol (Trelegy Ellipta) 200-62.5-25 mcg/actuation AEPB inhaler; Inhale 1 puff daily Rinse mouth after use.    Chronic  hypoxemic respiratory failure (HCC)  -     Complete PFT with post Bronchodilator and Six Minute walk; Future  -     Ambulatory Referral to Palliative Care; Future  -     Ambulatory Referral to Pulmonary Rehabilitation; Future    Centrilobular emphysema (HCC)          Return in about 2 months (around 2/28/2025).      History of Present Illness     Chief Complaint:   Chief Complaint   Patient presents with    Follow-up       Patient ID: Kei is a 84 y.o. y.o. male has a past medical history of Arthritis, COPD (chronic obstructive pulmonary disease) (HCC), Cough, Kidney stones, SOB (shortness of breath), and Wheezing.      12/30/2024  HPI: Kei Roth is a 84 y.o. male with a PMH of COPD/emphysema, GERD, CKD 3, chronic hypoxic respiratory failure, and former 45-pack-year smoker quit 7 years ago who is here today for a follow-up visit.  He is companied by his wife.  He was previously seen in the office 8 months ago with Dr. Covington.  Maintained on Anoro, albuterol, and 2 L supplemental oxygen.    Patient returns today, feels his breathing is getting worse.  Gets dyspneic with little activity.  No significant cough, mucus, or wheezing.  No chest pain or lower extremity swelling.  Using Anoro inhaler daily and albuterol inhaler up to 4 times daily.  Has anxiety, makes breathing worse.  Wearing 3 to 4 L oxygen at home continuously.  He is asking if there is any new treatment for his severe lung disease.      Review of Systems   Constitutional:  Negative for activity change, chills, fever and unexpected weight change.   HENT:  Negative for congestion, postnasal drip, rhinorrhea, sore throat and trouble swallowing.    Respiratory:  Positive for shortness of breath. Negative for cough, chest tightness and wheezing.    Cardiovascular:  Negative for chest pain, palpitations and leg swelling.   Allergic/Immunologic: Negative.        Historical Information   Past Medical History:   Diagnosis Date    Arthritis     COPD  (chronic obstructive pulmonary disease) (HCC)     Cough     Kidney stones     SOB (shortness of breath)     Wheezing      Past Surgical History:   Procedure Laterality Date    ABDOMINAL AORTIC ANEURYSM REPAIR      ENDOVASC REPAIR AAA PLACE VISCERAL EXTENS PROSTH    LAST ASSESSED [id number]    COLONOSCOPY  03/09/2011    HAND SURGERY      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      Family History   Problem Relation Age of Onset    Heart failure Mother     Diabetes Mother     Other Mother         MENTAL DISORDER        Smoking history: He reports that he quit smoking about 11 years ago. His smoking use included cigars. He started smoking about 56 years ago. He has been exposed to tobacco smoke. He quit smokeless tobacco use about 71 years ago.  His smokeless tobacco use included chew.    Occupational History:     Immunization History   Administered Date(s) Administered    COVID-19 MODERNA VACC 0.25 ML IM BOOSTER 12/15/2021    COVID-19 MODERNA VACC 0.5 ML IM 01/28/2021, 02/24/2021, 12/15/2021    INFLUENZA 10/04/2018, 11/30/2020, 10/18/2022    Influenza Split High Dose Preservative Free IM 10/04/2018, 09/19/2019    Influenza, high dose seasonal 0.7 mL 11/23/2021, 10/18/2022, 10/30/2023    Pneumococcal Conjugate 13-Valent 02/10/2017, 09/19/2019    Pneumococcal Polysaccharide PPV23 03/07/2022    Td (adult), Not Adsorbed 11/27/2016       Meds/Allergies     Current Outpatient Medications:     albuterol (2.5 mg/3 mL) 0.083 % nebulizer solution, Take 3 mL (2.5 mg total) by nebulization 4 (four) times a day, Disp: 1080 mL, Rfl: 3    clindamycin (CLEOCIN) 300 MG capsule, TAKE 1 CAPSULE BY MOUTH THREE TIMES A DAY FOR 10 DAYS, Disp: , Rfl:     fluticasone (FLONASE) 50 mcg/act nasal spray, SPRAY 2 SPRAYS INTO EACH NOSTRIL EVERY DAY, Disp: 48 mL, Rfl: 3    fluticasone-umeclidinium-vilanterol (Trelegy Ellipta) 200-62.5-25 mcg/actuation AEPB inhaler, Inhale 1 puff daily Rinse mouth after use., Disp: 60 blister, Rfl: 0     "mirtazapine (REMERON) 7.5 MG tablet, TAKE 1 TABLET (7.5 MG TOTAL) BY MOUTH DAILY AT BEDTIME, Disp: 90 tablet, Rfl: 2    nabumetone (RELAFEN) 500 mg tablet, TAKE 1 TABLET (500 MG TOTAL) BY MOUTH 2 (TWO) TIMES A DAY AS NEEDED (BACK PAIN), Disp: 60 tablet, Rfl: 5    omeprazole (PriLOSEC) 20 mg delayed release capsule, Take 1 capsule (20 mg total) by mouth daily, Disp: 90 capsule, Rfl: 5    rosuvastatin (CRESTOR) 20 MG tablet, TAKE 1 TABLET BY MOUTH EVERY DAY, Disp: 90 tablet, Rfl: 1    umeclidinium-vilanterol (Anoro Ellipta) 62.5-25 mcg/actuation inhaler, Inhale 1 puff daily, Disp: 60 each, Rfl: 1    Ventolin  (90 Base) MCG/ACT inhaler, INHALE 2 PUFFS EVERY 6 HOURS AS NEEDED FOR WHEEZING, Disp: 18 g, Rfl: 1  Allergies:   Allergies   Allergen Reactions    Codeine Nausea Only    Cortisone      Other reaction(s): Unknown    Penicillin G      Other reaction(s): Unknown    Penicillins          Vitals:  Vitals:    12/30/24 1504 12/30/24 1509   BP: 139/82    BP Location: Left arm    Patient Position: Sitting    Cuff Size: Large    Pulse: 79    Resp: 18    Temp: 98.5 °F (36.9 °C)    TempSrc: Oral    SpO2: 99% 99%   Weight: 73.9 kg (163 lb)    Height: 5' 9\" (1.753 m)      Oxygen Therapy  SpO2: 99 %  Oxygen Therapy: Supplemental oxygen  O2 Delivery Method: Nasal cannula  O2 Flow Rate (L/min): 4 L/min  .  Wt Readings from Last 3 Encounters:   12/30/24 73.9 kg (163 lb)   10/01/24 73.9 kg (163 lb)   04/22/24 76.1 kg (167 lb 11.2 oz)     Body mass index is 24.07 kg/m².    Physical Exam  Vitals and nursing note reviewed.   Constitutional:       General: He is not in acute distress.     Appearance: Normal appearance. He is well-developed.   Cardiovascular:      Rate and Rhythm: Normal rate and regular rhythm.      Heart sounds: Normal heart sounds. No murmur heard.  Pulmonary:      Effort: Pulmonary effort is normal. No respiratory distress.      Breath sounds: Normal breath sounds. No decreased breath sounds, wheezing, rhonchi " "or rales.   Musculoskeletal:         General: No swelling.      Right lower leg: No edema.      Left lower leg: No edema.   Psychiatric:         Mood and Affect: Mood and affect normal.         Behavior: Behavior normal. Behavior is cooperative.           Labs: I have personally reviewed pertinent lab results.  Lab Results   Component Value Date    WBC 9.01 07/30/2024    HGB 15.5 07/30/2024    HCT 47.8 07/30/2024    MCV 95 07/30/2024     07/30/2024     Lab Results   Component Value Date    CALCIUM 9.2 07/30/2024    K 4.0 07/30/2024    CO2 36 (H) 07/30/2024     07/30/2024    BUN 13 07/30/2024    CREATININE 1.12 07/30/2024     No results found for: \"IGE\"  Lab Results   Component Value Date    ALT 14 07/30/2024    AST 16 07/30/2024    ALKPHOS 70 07/30/2024       Imaging and other studies: I have personally reviewed pertinent reports and I have personally reviewed pertinent films in PACS     CT chest 10/13/23  Mild upper lobe and severe lower lobe emphysema. PReviously seen left upper lobe pulmonary nodule is no longer visualized.    Pulmonary function testing:     Pulmonary Functions Testing Results: 1/6/21    FEV1/FVC ratio 47%    FEV1 31% predicted  FVC 50% predicted  TLC 56 % predicted  RV 69 % predicted  DLCO corrected for hemoglobin 30 % predicted    Impression: Spirometry demonstrates predominantly severe obstructive ventilatory limitation. Lung volumes moderately decreased. DLCO very severely decreased.   Six minute walk: Patient could walk a total distance of 213 minutes 6 minutes, requiring supplemental oxygen of 2 liters/minute on exertion.  "

## 2024-12-30 NOTE — PATIENT INSTRUCTIONS
Trial Trelegy inhaler 1 puff daily, rinse mouth after. Do NOT take Anoro while trialing Trelegy  Continue Albuterol inhaler or nebs every 6 hours as needed  Schedule PFTs  Pulmonary Rehab  Palliative Care consult  [place] valve

## 2025-01-06 RX ORDER — FLUTICASONE FUROATE, UMECLIDINIUM BROMIDE AND VILANTEROL TRIFENATATE 200; 62.5; 25 UG/1; UG/1; UG/1
1 POWDER RESPIRATORY (INHALATION) DAILY
Qty: 180 BLISTER | Refills: 0 | Status: SHIPPED | OUTPATIENT
Start: 2025-01-06 | End: 2025-04-06

## 2025-01-13 ENCOUNTER — TELEPHONE (OUTPATIENT)
Age: 85
End: 2025-01-13

## 2025-01-13 NOTE — TELEPHONE ENCOUNTER
Called and spoke with patient and let him know that Dr Gaspar is on vacation this week and only working one day next week. I asked patient if he would  be ok seeing someone else or we can wait for Dr Gaspar reply. Appointment made with IVÁN Lara on 1/16/25.

## 2025-01-13 NOTE — TELEPHONE ENCOUNTER
Pt called stating in the past few weeks he has been having on and off anxiety attacks. He states the last one being on Thursday as he was getting in the car after a doctors appointment. He states that because of his shortness of breathe due to his COPD it makes the anxiety worse. He is looking if Dr Gaspar could prescribe him something for anxiety to help when these things occur. Advised that he will probably need to be seen before this would be prescribed to him. He is agreeable for an appointment if needed, but would like to see what PCP would recommend first. Can call Kei at [phone]

## 2025-01-15 ENCOUNTER — TELEPHONE (OUTPATIENT)
Age: 85
End: 2025-01-15

## 2025-01-15 NOTE — TELEPHONE ENCOUNTER
Deborah from  [facility] walked in today asking to call her in regards this patient, called her lmom to call me back

## 2025-01-16 ENCOUNTER — OFFICE VISIT (OUTPATIENT)
Dept: FAMILY MEDICINE CLINIC | Facility: CLINIC | Age: 85
End: 2025-01-16
Payer: COMMERCIAL

## 2025-01-16 VITALS
TEMPERATURE: 97.6 F | DIASTOLIC BLOOD PRESSURE: 82 MMHG | OXYGEN SATURATION: 81 % | WEIGHT: 161 LBS | HEART RATE: 81 BPM | SYSTOLIC BLOOD PRESSURE: 142 MMHG | HEIGHT: 69 IN | BODY MASS INDEX: 23.85 KG/M2

## 2025-01-16 DIAGNOSIS — F41.0 PANIC ANXIETY SYNDROME: Primary | ICD-10-CM

## 2025-01-16 DIAGNOSIS — R09.81 SINUS CONGESTION: ICD-10-CM

## 2025-01-16 DIAGNOSIS — M51.9 LUMBAR DISC DISEASE: ICD-10-CM

## 2025-01-16 DIAGNOSIS — J34.2 DEVIATED SEPTUM: ICD-10-CM

## 2025-01-16 PROBLEM — F41.9 ANXIETY: Status: ACTIVE | Noted: 2025-01-16

## 2025-01-16 PROCEDURE — 99213 OFFICE O/P EST LOW 20 MIN: CPT | Performed by: PHYSICIAN ASSISTANT

## 2025-01-16 RX ORDER — HYDROXYZINE HYDROCHLORIDE 25 MG/1
25 TABLET, FILM COATED ORAL EVERY 6 HOURS PRN
Qty: 30 TABLET | Refills: 2 | Status: SHIPPED | OUTPATIENT
Start: 2025-01-16

## 2025-01-16 NOTE — ASSESSMENT & PLAN NOTE
Orders:  •  hydrOXYzine HCL (ATARAX) 25 mg tablet; Take 1 tablet (25 mg total) by mouth every 6 (six) hours as needed for itching

## 2025-01-16 NOTE — PROGRESS NOTES
"Name: Kei Roth      : 1940      MRN: [id number]  Encounter Provider: Tonya Ingram PA-C  Encounter Date: 2025   Encounter department: [facility] [address]  :  Assessment & Plan  Panic anxiety syndrome    Orders:  •  hydrOXYzine HCL (ATARAX) 25 mg tablet; Take 1 tablet (25 mg total) by mouth every 6 (six) hours as needed for itching    Deviated septum    Orders:  •  Ambulatory Referral to Otolaryngology; Future    Sinus congestion    Orders:  •  Ambulatory Referral to Otolaryngology; Future           History of Present Illness     Panic Attack  This is a new problem. The current episode started more than 1 month ago. The problem occurs every several days. The problem has been gradually worsening. Associated symptoms include congestion. Pertinent negatives include no diaphoresis, fever or sore throat. The symptoms are aggravated by stress. He has tried rest for the symptoms. The treatment provided mild relief.     Review of Systems   Constitutional:  Negative for diaphoresis and fever.   HENT:  Positive for congestion. Negative for ear pain, sore throat and trouble swallowing.    Respiratory:  Positive for chest tightness and shortness of breath.    Psychiatric/Behavioral:  Negative for confusion, decreased concentration, dysphoric mood, hallucinations, self-injury and suicidal ideas. The patient is nervous/anxious.        Objective   /82   Pulse 81   Temp 97.6 °F (36.4 °C)   Ht 5' 9\" (1.753 m)   Wt 73 kg (161 lb)   SpO2 (!) 81%   BMI 23.78 kg/m²      Physical Exam  Vitals and nursing note reviewed.   Constitutional:       Appearance: Normal appearance.   HENT:      Nose:      Comments: Nasal canula in place  Eyes:      Conjunctiva/sclera: Conjunctivae normal.   Cardiovascular:      Rate and Rhythm: Normal rate.   Pulmonary:      Effort: Tachypnea and accessory muscle usage present.   Neurological:      Mental Status: He is alert and " oriented to person, place, and time.   Psychiatric:         Attention and Perception: Attention normal.         Mood and Affect: Affect normal. Mood is anxious.         Speech: Speech normal.         Behavior: Behavior normal. Behavior is cooperative.         Thought Content: Thought content normal.         Judgment: Judgment normal.

## 2025-01-17 ENCOUNTER — TELEPHONE (OUTPATIENT)
Dept: FAMILY MEDICINE CLINIC | Facility: CLINIC | Age: 85
End: 2025-01-17

## 2025-01-17 RX ORDER — NABUMETONE 500 MG/1
500 TABLET, FILM COATED ORAL 2 TIMES DAILY PRN
Qty: 60 TABLET | Refills: 5 | Status: SHIPPED | OUTPATIENT
Start: 2025-01-17

## 2025-01-17 NOTE — TELEPHONE ENCOUNTER
PA for hydrOXYzine HCL (ATARAX) 25 mg tablet  APPROVED     Date(s) approved 11/17/2024 to 01/16/2026      Patient advised by          []MyChart Message  []Phone call   []LMOM  []L/M to call office as no active Communication consent on file  []Unable to leave detailed message as VM not approved on Communication consent       Pharmacy advised by    [x]Fax  []Phone call    Approval letter scanned into Media Yes

## 2025-01-17 NOTE — TELEPHONE ENCOUNTER
Requested medication(s) are due for refill today: Yes  Patient has already received a courtesy refill: No  Other reason request has been forwarded to provider: Dr. Lynn, are you able to assist us with reviewing this request in the absence of Dr. Gaspar?

## 2025-01-17 NOTE — TELEPHONE ENCOUNTER
PA for hydrOXYzine HCL (ATARAX) 25 mg tablet SUBMITTED to     via    [x][facility]-KEY: [id number]  []Surescripts-Case ID #   []Availity-Auth ID # NDC #   []Faxed to plan   []Other website   []Phone call Case ID #     [x]PA sent as URGENT    All office notes, labs and other pertaining documents and studies sent. Clinical questions answered. Awaiting determination from insurance company.     Turnaround time for your insurance to make a decision on your Prior Authorization can take 7-21 business days.

## 2025-01-23 NOTE — TELEPHONE ENCOUNTER
Cassie from [facility] is calling for a fax number as she will be faxing over an order for the patient's oxygen equipment. Central fax number was provided.

## 2025-01-24 ENCOUNTER — CLINICAL SUPPORT (OUTPATIENT)
Facility: HOSPITAL | Age: 85
End: 2025-01-24
Payer: COMMERCIAL

## 2025-01-24 DIAGNOSIS — J44.9 CHRONIC OBSTRUCTIVE PULMONARY DISEASE, UNSPECIFIED COPD TYPE (HCC): Primary | ICD-10-CM

## 2025-01-24 DIAGNOSIS — J96.11 CHRONIC HYPOXEMIC RESPIRATORY FAILURE (HCC): ICD-10-CM

## 2025-01-24 PROCEDURE — 94625 PHY/QHP OP PULM RHB W/O MNTR: CPT

## 2025-01-24 NOTE — PROGRESS NOTES
Pulmonary Rehabilitation   Assessment and Individualized Treatment Plan  INITIAL       Today's date: 2025  # of Exercise Sessions Completed: Initial Evaluation Today  Patient name: Kei Roth     : 1940       MRN: [id number]  Referring Physician: Taylor Covington MD  Pulmonologist: Taylor Covington MD (OUMAR Jay)  Provider: Morteza  Clinician: Avery Steele, MS, CEP, CCRP    Dx:    Encounter Diagnosis   Name Primary?    Chronic obstructive pulmonary disease, unspecified COPD type (HCC) Yes     Date of onset: 21      Treatment is tailored to this patient's individual needs.  The ITP was reviewed with the patient and all questions were answered to their satisfaction.  Additional ITP documentation can be found electronically including daily and monthly exercise summaries, daily session notes with ECG summaries, education notes, daily medication reconciliation, and daily physician supervision.      COMMENTS:  Pt is using 4L O2 with his home concentrator.  He is very frustrated that he needs to depend on people to help him with household tasks.  Has lost his independence.  He has difficulty breathing from his nose when he tries to sleep will be seeing a specialist in the near future.  Jonathan walked 490ft in the 6MWT  on 4L O2with one 2:45 seated rest. BENITEZ 8/10, SpO2 remained 94-95%.        ASSESSMENT      Medical History:   Past Medical History:   Diagnosis Date    Arthritis     COPD (chronic obstructive pulmonary disease) (HCC)     Cough     Kidney stones     SOB (shortness of breath)     Wheezing        Family History:  Family History   Problem Relation Age of Onset    Heart failure Mother     Diabetes Mother     Other Mother         MENTAL DISORDER        Allergies:   Codeine, Cortisone, Penicillin g, and Penicillins    Current Medications:   Current Outpatient Medications   Medication Sig Dispense Refill    albuterol (2.5 mg/3 mL) 0.083 % nebulizer solution Take 3 mL (2.5 mg total)  by nebulization 4 (four) times a day 1080 mL 3    fluticasone (FLONASE) 50 mcg/act nasal spray SPRAY 2 SPRAYS INTO EACH NOSTRIL EVERY DAY 48 mL 3    fluticasone-umeclidinium-vilanterol (Trelegy Ellipta) 200-62.5-25 mcg/actuation AEPB inhaler Inhale 1 puff daily Rinse mouth after use. 180 blister 0    hydrOXYzine HCL (ATARAX) 25 mg tablet Take 1 tablet (25 mg total) by mouth every 6 (six) hours as needed for itching 30 tablet 2    mirtazapine (REMERON) 7.5 MG tablet TAKE 1 TABLET (7.5 MG TOTAL) BY MOUTH DAILY AT BEDTIME 90 tablet 2    nabumetone (RELAFEN) 500 mg tablet TAKE 1 TABLET (500 MG TOTAL) BY MOUTH 2 (TWO) TIMES A DAY AS NEEDED (BACK PAIN) 60 tablet 5    omeprazole (PriLOSEC) 20 mg delayed release capsule Take 1 capsule (20 mg total) by mouth daily 90 capsule 5    rosuvastatin (CRESTOR) 20 MG tablet TAKE 1 TABLET BY MOUTH EVERY DAY 90 tablet 1    Ventolin  (90 Base) MCG/ACT inhaler INHALE 2 PUFFS EVERY 6 HOURS AS NEEDED FOR WHEEZING 18 g 1     No current facility-administered medications for this visit.       Physical Limitations: none    Fall Risk: Low   Comments: Ambulates with a steady gait with no assist device and Denies a fall in the past 6 months    Cultural needs: none    PFT:  Yes     FEV1/FVC ratio of 62%   FEV1 of 31% predicted  severeobstruction.    Medication compliance: Yes  Comments: Pt reports to be compliant with medications      Pulmonary Disease Risk Factors:  occupational exposure to workplace  dusts, chemicals, fumes, aerosols, and asbestos    Sleep Disorders:   none      EXERCISE ASSESSMENT:      Initial Fitness Assessment: 6MWT:  Resting:    Resting:  BP: 128/78  HR: 101  SpO2: 98  Dyspnea: 3  O2 Use: 4 l/min, Exercise:  BP: 200/80  HR: 122  SpO2: 94-95%  Dyspnea: 8  O2 use: 4 l/min, METs:  1.7, ECG Summary: NSR, and Comments: one seated rest 2:45      Current Functional Status:  Occupation: retired from construction x 60 years  Recreation/Physical activity: fish, hunt ([place]  state), yard work  - not in a while  ADL’s:limited by Dyspnea  [place]: No limitations  Exercise:  none  Home exercise equipment: none  Other Comments: doesn't go food shopping because he cannot walk around the store    SMART Exercise Goals:   reduced score on CAT, improved 6MWT distance, reduced dyspnea during exercise, improved exercise tolerance based on peak METs tolerated in pulmonary rehab exercise session, SpO2 >88% during exercise, and reduced RPD at rest    Patient Specific EXERCISE GOALS:     Increased independence  Ability to do more around the house with reduced SOB  More stamina/energy    PSYCHOSOCIAL ASSESSMENT:    Date of last assessment: 1/24/25  Depression screening:  PHQ-9 = 19    Interpretation:  15-19 = Moderately Severe Depression  Anxiety screening:  BETTYE-7 = 14    Interpretation: 10-14 = Moderate anxiety    Pt self-report of depression and anxiety   Patient has a history of anxiety when he can't breathe - now always carries his portable concentrator     Self-reported stress level:  3 - gets frustrated he can't do things, health  Stress Management: read, TV, and computer    Quality of Life Screen:  (Higher score indicates disease impact on QOL)  [facility] COOP score: 34/40      CAT: 17/40      Shortness of breath questionnaire: 81/120    Social Support:   spouse, daughter in law, and grandchildren, tenants, sister and nephew, friends  Community/Social Activities: none    SMART Goals:    Reduce perceived stress to 1-3/10, improved [facility] QOL < 27, PHQ-9 - reduced severity by one level, Feelings in [facility] Score < 3, Physical Fitness in [facility] Score < 3, Social Support in [facility] Score < 3, Daily Activity in [facility] Score < 3, Social Activities in [facility] Score < 3, Pain in [facility] Score < 3, Overall Health in [facility] Score < 3, Quality of Life in [facility] Score < 3 , Change in Health in [facility] Score < 3 ,  Increased interest and pleasure in doing things,  reduced time  "feeling down, depressed or hopeless, improved sleeping habits, feel less tired with more energy, Improved appetite control, reduced time feeling like a failure and having negative self thoughts, improved concentration, reduced incidence of restlessness and/or lethargy, reduced thoughts of self harm, reduced time feeling nervous or on edge, control or stop worrying, take time to relax, reduced feelings of restlessness, reduced irritability, and avoid thoughts of feeling as though something bad may happen    Patient Specific PSYCHOCOSOCIAL GOALS:    Increase his ability to do more around the house and not need so much help     Psychosocial Assessment as it relates to rehabilitation: Patient denies issues with his/her family or home life that may affect their rehabilitation efforts.       NUTRITION ASSESSMENT:    Initial Weight:  160.4  Current Weight: 160.4    Height:   Ht Readings from Last 1 Encounters:   01/16/25 5' 9\" (1.753 m)       Rate Your Plate Score: 48/81    Self-reported Current Dietary Habits:  Doesn't have a great appetite  Wife makes a nice supper  Coffee with cookies  Cream cheese/jelly toast  No protein until dinner  Vegs with dinner  Limited fruit  No a big meat eater    ETOH:   Social History     Substance and Sexual Activity   Alcohol Use Yes    Comment: OCCASIONAL        SMART Goals:   Improved Rate Your Plate score  >64, eat no more than 6oz of meat per day, eat poultry without the skin, eat meatless meals twice a week or more, rarely eat frozen desserts or choose fruit or fat-free sweets, use \"light\" tub margarine on bread, potatoes and vegetables, and rarely/never drink more than 1-2 alcoholic drinks per day.    Patient Specific NUTRITION GOALS:    improve hydration increased muscle mass        OTHER CORE COMPONENT ASSESSMENT:    Hospitalizations in the past year: none    Oxygen needs:   Rest:  supplemental O2 via nasal cannula @ 4L/min   Exercise/physical activity:  supplemental O2 via nasal " cannula @ 4L/min   Sleep:   supplemental O2 via nasal cannula @ 4L/min     Does Pt monitor home SpO2? yes   Average SpO2 at rest:  98%, with O2   Average SpO2 with ADLs/physical activity:  unknown      Use of Rescue Inhaler: as needed    Use of Maintenance Inhaler: Yes:  1 times per day    Use of Nebulizer Treatments:  No    Patient practices breathing techniques at home:  Yes and Reviewed with patient on:  25    CAD Risk Factors:  Cholesterol: Yes  HTN: No  DM: No  Obesity: No     Smoking: Former user:  smoked cigars [id number]    SMART Core Component Goals:   medication compliance and demonstrate pursed lipped breathing    Patient Specific CORE COMPONENT GOALS:    monitor home pulse oximetry      Blood Pressure:    Restin/78  Exercise: 200/80  Recovery: 132/80      INDIVIDUALIZED TREATMENT PLAN    The patient is agreeable to attend 24 pulmonary rehab exercise sessions.      EXERCISE GOALS AND PLAN    PHYSCIAN PRESCRIBED EXERCISE    Current Aerobic Exercise Prescription       Frequency: 2 days/week   Supplement with home exercise 2+ days/wk as tolerated        Minutes: 20-40         METS: 1.2-2.8              SpO2: >88%              RPD: 4-6                      HR: RHR +30-40bpm   RPE: 4-5         Modalities: Treadmill, UBE, NuStep, Recumbent bike, and Room walking      Aerobic Exercise Prescription Plan for Progression:    Frequency: 2 days/week    Supplement with home exercise 2+ days/wk as tolerated       Minutes: 30-40        METS: 2.0-3.2              SpO2: >88%              RPD: 4-6                      HR:RHR +30-40bpm   RPE: 4-5        Modalities: Treadmill, UBE, NuStep, Recumbent bike, and Room walking        Supplemental Oxygen Needs with Exercise:  supplemental O2 3-4L/min via nasal canula.    Strength trainin-3 days / week  12-15 repetitions  1-2 sets per modality    Modalities: Leg Press, Chest Press, Pull Downs, and Arm Curl    Education: pursed lipped breathing, diaphragmatic  "breathing, home exercise guidelines, RPE scale, RPD scale, O2 saturation monitoring, and appropriate O2 response to exercise    Progress toward Exercise Goals:    Reviewed Pt goals and determined plan of care    Exercise Plan:  Titrate supplemental oxygen as needed to maintain SpO2>88% with exercise, learn to conserve energy with ADLs , reduce time sitting at home, increased strength of respiratory muscles, utilize PLB with physical activity, begin a home exercise program , and After discharge patient will continue to follow a regular exercise regimen with the goal of a minimum of 150 minutes per week of moderate intensity exercise.      Readiness to change: Preparation:  (Getting ready to change)       NUTRITION GOALS AND PLAN    Progress toward Nutritional goals:    Reviewed Pt goals and determined plan of care    Nutrition Plan: group class: Reading Food Labels, group Class: Heart Healthy Eating, limit meat intake to less than 6oz per day, eat poultry without the skin, eat more meatless meals, use \"light\" tub margarine, and drink no more than 1-2 alcoholic drinks in a day    SMART goals are based Rate Your Plate Dietary Self-Assessment. These are the areas in which the patient could score higher on the assessment.  Goals include recommendations for a heart healthy diet based on American Heart Association.    Nutrition Education: low sodium diet  maintaining hydration  nutrition for  lipid management    Readiness to change: Preparation:  (Getting ready to change)       PSYCHOSOCIAL GOALS AND PLAN    Information to begin using [employer] was provided as well as contact information for counseling through  [facility].    Progress toward Psychosocial goals:    Reviewed Pt goals and determined plan of care    Psychosocial Plan: Class:  Stress and Pulmonary Disease, PHQ-9 >5 will refer to MD, Practice relaxation techniques, Spend time outdoors, Read a Book, Enjoy family, and Repeat PHQ-9 every 30 days if " score >5    Psychosocial Education: benefits of a positive support system, stress management techniques, depression and pulmonary disease, and benefits of mental health counseling    Readiness to change: Contemplation:  (Acknowledging that there is a problem but not yet ready or sure of wanting to make a change)      OTHER CORE COMPONENTS GOALS AND PLAN    Tobacco Use Intervention:     N/A: Pt has a remote history of smoking    Oxygen Goal: Maintain SpO2>88% during exercise or as advised by pulmonolgist    Progress toward Core Component goals:    Reviewed Pt goals and determined plan of care    Core Component Plan: group class: Understanding Heart Disease, group class: Common Heart Medications, medication compliance, avoid processed foods, engage in regular exercise, eliminate salt shaker at the table, use salt substitutes, check labels for sodium content, and monitor home BP    Core Component Education:  pathophysiology of pulmonary disease, inspiratory muscle training, nebulizer use, and education: Oxygen    Readiness to change: Preparation:  (Getting ready to change)

## 2025-01-28 ENCOUNTER — CLINICAL SUPPORT (OUTPATIENT)
Facility: HOSPITAL | Age: 85
End: 2025-01-28
Payer: COMMERCIAL

## 2025-01-28 DIAGNOSIS — J44.9 CHRONIC OBSTRUCTIVE PULMONARY DISEASE, UNSPECIFIED COPD TYPE (HCC): Primary | ICD-10-CM

## 2025-01-28 PROCEDURE — 94625 PHY/QHP OP PULM RHB W/O MNTR: CPT

## 2025-01-30 ENCOUNTER — APPOINTMENT (OUTPATIENT)
Facility: HOSPITAL | Age: 85
End: 2025-01-30
Payer: COMMERCIAL

## 2025-02-04 ENCOUNTER — CLINICAL SUPPORT (OUTPATIENT)
Facility: HOSPITAL | Age: 85
End: 2025-02-04
Payer: COMMERCIAL

## 2025-02-04 DIAGNOSIS — J44.9 CHRONIC OBSTRUCTIVE PULMONARY DISEASE, UNSPECIFIED COPD TYPE (HCC): Primary | ICD-10-CM

## 2025-02-04 PROCEDURE — 94625 PHY/QHP OP PULM RHB W/O MNTR: CPT

## 2025-02-06 ENCOUNTER — APPOINTMENT (OUTPATIENT)
Facility: HOSPITAL | Age: 85
End: 2025-02-06
Payer: COMMERCIAL

## 2025-02-11 ENCOUNTER — CLINICAL SUPPORT (OUTPATIENT)
Facility: HOSPITAL | Age: 85
End: 2025-02-11
Payer: COMMERCIAL

## 2025-02-11 DIAGNOSIS — J44.9 CHRONIC OBSTRUCTIVE PULMONARY DISEASE, UNSPECIFIED COPD TYPE (HCC): Primary | ICD-10-CM

## 2025-02-11 PROCEDURE — 94625 PHY/QHP OP PULM RHB W/O MNTR: CPT

## 2025-02-13 ENCOUNTER — APPOINTMENT (OUTPATIENT)
Facility: HOSPITAL | Age: 85
End: 2025-02-13
Payer: COMMERCIAL

## 2025-02-13 ENCOUNTER — HOSPITAL ENCOUNTER (OUTPATIENT)
Dept: PULMONOLOGY | Facility: HOSPITAL | Age: 85
End: 2025-02-13
Payer: COMMERCIAL

## 2025-02-13 DIAGNOSIS — J96.11 CHRONIC HYPOXEMIC RESPIRATORY FAILURE (HCC): ICD-10-CM

## 2025-02-13 DIAGNOSIS — J44.9 CHRONIC OBSTRUCTIVE PULMONARY DISEASE, UNSPECIFIED COPD TYPE (HCC): ICD-10-CM

## 2025-02-13 PROCEDURE — 94618 PULMONARY STRESS TESTING: CPT

## 2025-02-13 RX ORDER — ALBUTEROL SULFATE 0.83 MG/ML
2.5 SOLUTION RESPIRATORY (INHALATION) ONCE
Status: COMPLETED | OUTPATIENT
Start: 2025-02-13 | End: 2025-02-13

## 2025-02-13 RX ADMIN — ALBUTEROL SULFATE 2.5 MG: 2.5 SOLUTION RESPIRATORY (INHALATION) at 13:57

## 2025-02-18 ENCOUNTER — CLINICAL SUPPORT (OUTPATIENT)
Facility: HOSPITAL | Age: 85
End: 2025-02-18
Payer: COMMERCIAL

## 2025-02-18 DIAGNOSIS — J44.9 CHRONIC OBSTRUCTIVE PULMONARY DISEASE, UNSPECIFIED COPD TYPE (HCC): Primary | ICD-10-CM

## 2025-02-18 PROCEDURE — 94625 PHY/QHP OP PULM RHB W/O MNTR: CPT

## 2025-02-20 ENCOUNTER — APPOINTMENT (OUTPATIENT)
Facility: HOSPITAL | Age: 85
End: 2025-02-20
Payer: COMMERCIAL

## 2025-02-20 NOTE — PROGRESS NOTES
Pulmonary Rehabilitation   Assessment and Individualized Treatment Plan  30 DAY      Today's date: 2025  # of Exercise Sessions Completed: 5  Patient name: Kei Roth     : 1940       MRN: [id number]  Referring Physician: Jannie Covington MD  Pulmonologist: Jannie Covington MD (OUMAR Jay)  Provider: Morteza  Clinician: Ileana Newberry MS     Dx:    Encounter Diagnosis   Name Primary?    Chronic obstructive pulmonary disease, unspecified COPD type (HCC) Yes     Date of onset: 21      Treatment is tailored to this patient's individual needs.  The ITP was reviewed with the patient and all questions were answered to their satisfaction.  Additional ITP documentation can be found electronically including daily and monthly exercise summaries, daily session notes with ECG summaries, education notes, daily medication reconciliation, and daily physician supervision.      COMMENTS:    SUMMARY 2025    Resting BP  120/80 - 172/90,  HR 99 - 104  Exercise /60- 168/96.   - 125  Exercise session details:  30 minutes,  1.7-3.5 (Bike) METs  LO2:   Rest:  4 L/min, Exercise:  4L/min  SpO2: Rest:  93-97 %,  Exercise:  85-98 %  Dyspnea:   Rest:  0-5/10,  Exercise: 3-9/10  Home exercise/ADLs: none  Patient's subjective report of progress: Jonathan has been having difficulty breathing and orthopedic pain. He called out of rehab today, will further reassess with his return.   .    Pt is using 4L O2 with his home concentrator.  He is very frustrated that he needs to depend on people to help him with household tasks.  Has lost his independence.  He has difficulty breathing from his nose when he tries to sleep will be seeing a specialist in the near future.  Jonathan walked 490ft in the 6MWT  on 4L O2with one 2:45 seated rest. BENITEZ 8/10, SpO2 remained 94-95%.        ASSESSMENT      Medical History:   Past Medical History:   Diagnosis Date    Arthritis     COPD (chronic obstructive pulmonary  disease) (HCC)     Cough     Kidney stones     SOB (shortness of breath)     Wheezing        Family History:  Family History   Problem Relation Age of Onset    Heart failure Mother     Diabetes Mother     Other Mother         MENTAL DISORDER        Allergies:   Codeine, Cortisone, Penicillin g, and Penicillins    Current Medications:   Current Outpatient Medications   Medication Sig Dispense Refill    albuterol (2.5 mg/3 mL) 0.083 % nebulizer solution Take 3 mL (2.5 mg total) by nebulization 4 (four) times a day 1080 mL 3    fluticasone (FLONASE) 50 mcg/act nasal spray SPRAY 2 SPRAYS INTO EACH NOSTRIL EVERY DAY 48 mL 3    fluticasone-umeclidinium-vilanterol (Trelegy Ellipta) 200-62.5-25 mcg/actuation AEPB inhaler Inhale 1 puff daily Rinse mouth after use. 180 blister 0    hydrOXYzine HCL (ATARAX) 25 mg tablet Take 1 tablet (25 mg total) by mouth every 6 (six) hours as needed for itching 30 tablet 2    mirtazapine (REMERON) 7.5 MG tablet TAKE 1 TABLET (7.5 MG TOTAL) BY MOUTH DAILY AT BEDTIME 90 tablet 2    nabumetone (RELAFEN) 500 mg tablet TAKE 1 TABLET (500 MG TOTAL) BY MOUTH 2 (TWO) TIMES A DAY AS NEEDED (BACK PAIN) 60 tablet 5    omeprazole (PriLOSEC) 20 mg delayed release capsule Take 1 capsule (20 mg total) by mouth daily 90 capsule 5    rosuvastatin (CRESTOR) 20 MG tablet TAKE 1 TABLET BY MOUTH EVERY DAY 90 tablet 1    Ventolin  (90 Base) MCG/ACT inhaler INHALE 2 PUFFS EVERY 6 HOURS AS NEEDED FOR WHEEZING 18 g 1     No current facility-administered medications for this visit.       Physical Limitations: none    Fall Risk: Low   Comments: Ambulates with a steady gait with no assist device and Denies a fall in the past 6 months    Cultural needs: none    PFT:  Yes     FEV1/FVC ratio of 62%   FEV1 of 31% predicted  severeobstruction.    Medication compliance: Yes  Comments: Pt reports to be compliant with medications      Pulmonary Disease Risk Factors:  occupational exposure to workplace  dusts,  chemicals, fumes, aerosols, and asbestos    Sleep Disorders:   none      EXERCISE ASSESSMENT:      Initial Fitness Assessment: 6MWT:  Resting:    Resting:  BP: 128/78  HR: 101  SpO2: 98  Dyspnea: 3  O2 Use: 4 l/min, Exercise:  BP: 200/80  HR: 122  SpO2: 94-95%  Dyspnea: 8  O2 use: 4 l/min, METs:  1.7, ECG Summary: NSR, and Comments: one seated rest 2:45      Current Functional Status:  Occupation: retired from construction x 60 years  Recreation/Physical activity: fish, hunt ([place] [employer]), yard work  - not in a while  ADL’s:limited by Dyspnea  [place]: No limitations  Exercise:  none  Home exercise equipment: none  Other Comments: doesn't go food shopping because he cannot walk around the store    SMART Exercise Goals:   reduced score on CAT, improved 6MWT distance, reduced dyspnea during exercise, improved exercise tolerance based on peak METs tolerated in pulmonary rehab exercise session, SpO2 >88% during exercise, and reduced RPD at rest    Patient Specific EXERCISE GOALS:     Increased independence  Ability to do more around the house with reduced SOB  More stamina/energy    PSYCHOSOCIAL ASSESSMENT:    Date of last assessment: 1/24/25  Depression screening:  PHQ-9 = 19    Interpretation:  15-19 = Moderately Severe Depression  Anxiety screening:  BETTYE-7 = 14    Interpretation: 10-14 = Moderate anxiety    Pt self-report of depression and anxiety   Patient has a history of anxiety when he can't breathe - now always carries his portable concentrator     Self-reported stress level:  3 - gets frustrated he can't do things, health  Stress Management: read, TV, and computer    Quality of Life Screen:  (Higher score indicates disease impact on QOL)  [facility] COOP score: 34/40      CAT: 17/40      Shortness of breath questionnaire: 81/120    Social Support:   spouse, daughter in law, and grandchildren, tenants, sister and nephew, friends  Community/Social Activities: none    SMART Goals:    Reduce perceived stress to  "1-3/10, improved [facility] QOL < 27, PHQ-9 - reduced severity by one level, Feelings in [facility] Score < 3, Physical Fitness in [facility] Score < 3, Social Support in [facility] Score < 3, Daily Activity in [facility] Score < 3, Social Activities in [facility] Score < 3, Pain in [facility] Score < 3, Overall Health in [facility] Score < 3, Quality of Life in [facility] Score < 3 , Change in Health in [facility] Score < 3 ,  Increased interest and pleasure in doing things,  reduced time feeling down, depressed or hopeless, improved sleeping habits, feel less tired with more energy, Improved appetite control, reduced time feeling like a failure and having negative self thoughts, improved concentration, reduced incidence of restlessness and/or lethargy, reduced thoughts of self harm, reduced time feeling nervous or on edge, control or stop worrying, take time to relax, reduced feelings of restlessness, reduced irritability, and avoid thoughts of feeling as though something bad may happen    Patient Specific PSYCHOCOSOCIAL GOALS:    Increase his ability to do more around the house and not need so much help     Psychosocial Assessment as it relates to rehabilitation: Patient denies issues with his/her family or home life that may affect their rehabilitation efforts.       NUTRITION ASSESSMENT:    Initial Weight:  160.4  Current Weight: 160.4 lbs    Height:   Ht Readings from Last 1 Encounters:   01/16/25 5' 9\" (1.753 m)       Rate Your Plate Score: 48/81    Self-reported Current Dietary Habits:  Doesn't have a great appetite  Wife makes a nice supper  Coffee with cookies  Cream cheese/jelly toast  No protein until dinner  Vegs with dinner  Limited fruit  No a big meat eater    ETOH:   Social History     Substance and Sexual Activity   Alcohol Use Yes    Comment: OCCASIONAL        SMART Goals:   Improved Rate Your Plate score  >64, eat no more than 6oz of meat per day, eat poultry without the skin, eat meatless meals twice a " "week or more, rarely eat frozen desserts or choose fruit or fat-free sweets, use \"light\" tub margarine on bread, potatoes and vegetables, and rarely/never drink more than 1-2 alcoholic drinks per day.    Patient Specific NUTRITION GOALS:    improve hydration increased muscle mass        OTHER CORE COMPONENT ASSESSMENT:    Hospitalizations in the past year: none    Oxygen needs:   Rest:  supplemental O2 via nasal cannula @ 4L/min   Exercise/physical activity:  supplemental O2 via nasal cannula @ 4L/min   Sleep:   supplemental O2 via nasal cannula @ 4L/min     Does Pt monitor home SpO2? yes   Average SpO2 at rest:  98%, with O2   Average SpO2 with ADLs/physical activity:  unknown      Use of Rescue Inhaler: as needed    Use of Maintenance Inhaler: Yes:  1 times per day    Use of Nebulizer Treatments:  No    Patient practices breathing techniques at home:  Yes and Reviewed with patient on:  25    CAD Risk Factors:  Cholesterol: Yes  HTN: No  DM: No  Obesity: No     Smoking: Former user:  smoked cigars [id number]    SMART Core Component Goals:   medication compliance and demonstrate pursed lipped breathing    Patient Specific CORE COMPONENT GOALS:    monitor home pulse oximetry      Blood Pressure:    Restin//90  Exercise: 132//96  Recovery: 112//82      INDIVIDUALIZED TREATMENT PLAN    The patient is agreeable to attend 24 pulmonary rehab exercise sessions.      EXERCISE GOALS AND PLAN    PHYSCIAN PRESCRIBED EXERCISE    Current Aerobic Exercise Prescription       Frequency: 2 days/week   Supplement with home exercise 2+ days/wk as tolerated        Minutes: 30         METS: 1.7-3.5              SpO2: 85-98%              RPD: 3-9                      HR:  104-125   RPE: 4-5         Modalities: UBE, NuStep, and Recumbent bike      Aerobic Exercise Prescription Plan for Progression:    Frequency: 2 days/week    Supplement with home exercise 2+ days/wk as tolerated       Minutes: " "30-40        METS: 2.0-3.2              SpO2: >88%              RPD: 4-6                      HR:RHR +30-40bpm   RPE: 4-5        Modalities: Treadmill, UBE, NuStep, Recumbent bike, and Room walking        Supplemental Oxygen Needs with Exercise:  supplemental O2 3-4L/min via nasal canula.    Strength trainin-3 days / week  12-15 repetitions  1-2 sets per modality    Modalities: Leg Press, Chest Press, Pull Downs, and Arm Curl    Education: pursed lipped breathing, diaphragmatic breathing, home exercise guidelines, RPE scale, RPD scale, O2 saturation monitoring, and appropriate O2 response to exercise    Progress toward Exercise Goals:    Will continue to educate and progress as tolerated., will reassess with patients return to rehab    Exercise Plan:  Titrate supplemental oxygen as needed to maintain SpO2>88% with exercise, learn to conserve energy with ADLs , reduce time sitting at home, increased strength of respiratory muscles, utilize PLB with physical activity, begin a home exercise program , and After discharge patient will continue to follow a regular exercise regimen with the goal of a minimum of 150 minutes per week of moderate intensity exercise.      Readiness to change: Preparation:  (Getting ready to change)       NUTRITION GOALS AND PLAN    Progress toward Nutritional goals:    Will continue to educate and progress as tolerated., will reassess with patients return to rehab    Nutrition Plan: group class: Reading Food Labels, group Class: Heart Healthy Eating, limit meat intake to less than 6oz per day, eat poultry without the skin, eat more meatless meals, use \"light\" tub margarine, and drink no more than 1-2 alcoholic drinks in a day    SMART goals are based Rate Your Plate Dietary Self-Assessment. These are the areas in which the patient could score higher on the assessment.  Goals include recommendations for a heart healthy diet based on American Heart Association.    Nutrition Education: low " sodium diet  maintaining hydration  nutrition for  lipid management    Readiness to change: Preparation:  (Getting ready to change)       PSYCHOSOCIAL GOALS AND PLAN    Information to begin using [employer] was provided as well as contact information for counseling through  [facility].    Progress toward Psychosocial goals:    Will continue to educate and progress as tolerated., will reassess with patients return to rehab    Psychosocial Plan: Class:  Stress and Pulmonary Disease, PHQ-9 >5 will refer to MD, Practice relaxation techniques, Spend time outdoors, Read a Book, Enjoy family, and Repeat PHQ-9 every 30 days if score >5    Psychosocial Education: benefits of a positive support system, stress management techniques, depression and pulmonary disease, and benefits of mental health counseling    Readiness to change: Contemplation:  (Acknowledging that there is a problem but not yet ready or sure of wanting to make a change)      OTHER CORE COMPONENTS GOALS AND PLAN    Tobacco Use Intervention:     N/A: Pt has a remote history of smoking    Oxygen Goal: Maintain SpO2>88% during exercise or as advised by pulmonolgist    Progress toward Core Component goals:    Will continue to educate and progress as tolerated., will reassess with patients return to rehab    Core Component Plan: group class: Understanding Heart Disease, group class: Common Heart Medications, medication compliance, avoid processed foods, engage in regular exercise, eliminate salt shaker at the table, use salt substitutes, check labels for sodium content, and monitor home BP    Core Component Education:  pathophysiology of pulmonary disease, inspiratory muscle training, nebulizer use, and education: Oxygen    Readiness to change: Preparation:  (Getting ready to change)

## 2025-02-21 ENCOUNTER — TELEPHONE (OUTPATIENT)
Age: 85
End: 2025-02-21

## 2025-02-21 NOTE — TELEPHONE ENCOUNTER
Patient called and stated he was unable to complete pulmonary function test with 6 min walk. He stated he went in for testing and they stated he should have stopped use of his medications 48 hrs prior to testing and patient thought it was to be stopped 24 hrs prior. Patient was sent home and needed to reschedule pft test. Central scheduling is needing a new order to schedule patient. Please advise.

## 2025-02-24 DIAGNOSIS — J44.9 CHRONIC OBSTRUCTIVE PULMONARY DISEASE, UNSPECIFIED COPD TYPE (HCC): Primary | ICD-10-CM

## 2025-02-25 ENCOUNTER — CLINICAL SUPPORT (OUTPATIENT)
Facility: HOSPITAL | Age: 85
End: 2025-02-25
Payer: COMMERCIAL

## 2025-02-25 DIAGNOSIS — J44.9 CHRONIC OBSTRUCTIVE PULMONARY DISEASE, UNSPECIFIED COPD TYPE (HCC): Primary | ICD-10-CM

## 2025-02-25 PROCEDURE — 94625 PHY/QHP OP PULM RHB W/O MNTR: CPT

## 2025-02-27 ENCOUNTER — APPOINTMENT (OUTPATIENT)
Facility: HOSPITAL | Age: 85
End: 2025-02-27
Payer: COMMERCIAL

## 2025-03-03 ENCOUNTER — OFFICE VISIT (OUTPATIENT)
Age: 85
End: 2025-03-03
Payer: COMMERCIAL

## 2025-03-03 VITALS
TEMPERATURE: 98.1 F | DIASTOLIC BLOOD PRESSURE: 78 MMHG | RESPIRATION RATE: 18 BRPM | BODY MASS INDEX: 23.85 KG/M2 | HEART RATE: 73 BPM | HEIGHT: 69 IN | SYSTOLIC BLOOD PRESSURE: 138 MMHG | WEIGHT: 161 LBS | OXYGEN SATURATION: 91 %

## 2025-03-03 DIAGNOSIS — J44.9 CHRONIC OBSTRUCTIVE PULMONARY DISEASE, UNSPECIFIED COPD TYPE (HCC): Primary | ICD-10-CM

## 2025-03-03 DIAGNOSIS — J43.2 CENTRILOBULAR EMPHYSEMA (HCC): ICD-10-CM

## 2025-03-03 DIAGNOSIS — R06.09 DOE (DYSPNEA ON EXERTION): ICD-10-CM

## 2025-03-03 DIAGNOSIS — J96.11 CHRONIC HYPOXEMIC RESPIRATORY FAILURE (HCC): ICD-10-CM

## 2025-03-03 PROCEDURE — 99214 OFFICE O/P EST MOD 30 MIN: CPT

## 2025-03-03 NOTE — PROGRESS NOTES
Follow-up  Visit - Pulmonary Medicine   Name: Kei Roth      : 1940      MRN: [id number]  Encounter Provider: OUMAR Roman  Encounter Date: 3/3/2025   Encounter department: [facility] [place]  :  Assessment & Plan  Chronic obstructive pulmonary disease, unspecified COPD type (HCC)  -Prior PFTs in  with FEV1 31%  -He is scheduled for updated PFTs on 2025  -Currently attending pulmonary rehab, last session 2025, does not feel this is helping and is worsening his chronic back pain  -Continues to have limited activity tolerance due to his breathing, although Trelegy is helping him a little better than the Anoro did    Plan:  -Continue Trelegy 200 daily, albuterol HFA/nebs every 6 hours as needed  -Update PFTs as scheduled on , may benefit from BLVR, will see if numbers meet preliminary requirements  -Update echocardiogram  -Palliative care referral placed at last office visit, encouraged patient to schedule for symptom management  -Follow-up in 3 months or sooner if needed    Orders:    Echo complete w/ contrast if indicated; Future    Chronic hypoxemic respiratory failure (HCC)  -6 MWT 2 weeks ago showed need for 6 L with ambulation.  Most recent pulmonary rehab visit demonstrated need for 4 L with ambulation, currently wearing 4 L  -Patient will continue to wear 4 L oxygen as needed to maintain SpO2 above 88%         Centrilobular emphysema (HCC)  -Severe lower lobe emphysema and mild upper lobe emphysema  -Continue inhaler therapies as mentioned above       BENITEZ (dyspnea on exertion)    Orders:    Echo complete w/ contrast if indicated; Future      Return in about 3 months (around 6/3/2025).    History of Present Illness   Kei Roth is a 84 y.o. male with history of severe COPD, chronic hypoxic respiratory failure, former smoker, GERD, and CKD who is here today for a follow-up visit.  Last seen in the office 2 months ago, was  "having worsening BENITEZ.  His inhaler therapy was escalated from Anoro to Trelegy 200.  He was sent for updated PFTs.  Was also referred to pulmonary rehab as well as palliative care.  He had a walk test with PFTs, demonstrated need for 6 L supplemental oxygen.  PFTs were not completed as patient did not hold his inhaler therapy for the appropriate time leading up to testing.  His PFTs are rescheduled on 4/2/2025.  He has been attending pulmonary rehab.  Last visit 2/25/2025.  Does not feel he is benefiting from this.  If anything, is making his chronic back pain worse.  Feels the Trelegy inhaler is helping him a little bit better than the Anoro.  Symptoms remain about the same.  Gets short of breath with minimal exertion.  No significant cough, mucus, or wheezing.  No chest pain or lower extremity swelling.      Review of Systems    Aside from what is mentioned in the HPI, ROS is otherwise negative         Medical History Reviewed by provider this encounter:  Tobacco  Allergies  Meds  Problems  Med Hx  Surg Hx  Fam Hx     .    Objective   /78 (BP Location: Right arm, Patient Position: Sitting, Cuff Size: Large)   Pulse 73   Temp 98.1 °F (36.7 °C) (Oral)   Resp 18   Ht 5' 9\" (1.753 m)   Wt 73 kg (161 lb)   SpO2 91%   BMI 23.78 kg/m²     Physical Exam  Vitals and nursing note reviewed.   Constitutional:       General: He is not in acute distress.     Appearance: Normal appearance. He is well-developed.   Cardiovascular:      Rate and Rhythm: Normal rate and regular rhythm.      Heart sounds: Normal heart sounds. No murmur heard.  Pulmonary:      Effort: Pulmonary effort is normal. No respiratory distress.      Breath sounds: Normal breath sounds. No decreased breath sounds, wheezing, rhonchi or rales.   Musculoskeletal:         General: No swelling.      Right lower leg: No edema.      Left lower leg: No edema.   Psychiatric:         Mood and Affect: Mood and affect normal.         Behavior: Behavior " "normal. Behavior is cooperative.           Diagnostic Data:  Labs: I personally reviewed the most recent laboratory data pertinent to today's visit.      Radiology results:  Radiology Results Review : No pertinent imaging studies reviewed.      PFT/spirometry results: Reviewed  No results found for: \"FEV1\", \"FVC\", \"YKM6PVT\", \"TLC\", \"DLCO\"       Oximetry testing:      Other studies:      OUMAR Roman      "

## 2025-03-03 NOTE — ASSESSMENT & PLAN NOTE
-6 MWT 2 weeks ago showed need for 6 L with ambulation.  Most recent pulmonary rehab visit demonstrated need for 4 L with ambulation, currently wearing 4 L  -Patient will continue to wear 4 L oxygen as needed to maintain SpO2 above 88%

## 2025-03-03 NOTE — ASSESSMENT & PLAN NOTE
-Prior PFTs in 2021 with FEV1 31%  -He is scheduled for updated PFTs on 4/2/2025  -Currently attending pulmonary rehab, last session 2/25/2025, does not feel this is helping and is worsening his chronic back pain  -Continues to have limited activity tolerance due to his breathing, although Trelegy is helping him a little better than the Anoro did    Plan:  -Continue Trelegy 200 daily, albuterol HFA/nebs every 6 hours as needed  -Update PFTs as scheduled on 4/2, may benefit from BLVR, will see if numbers meet preliminary requirements  -Update echocardiogram  -Palliative care referral placed at last office visit, encouraged patient to schedule for symptom management  -Follow-up in 3 months or sooner if needed    Orders:    Echo complete w/ contrast if indicated; Future

## 2025-03-03 NOTE — ASSESSMENT & PLAN NOTE
-Severe lower lobe emphysema and mild upper lobe emphysema  -Continue inhaler therapies as mentioned above

## 2025-03-04 NOTE — PROGRESS NOTES
Pulmonary Rehabilitation   Assessment and Individualized Treatment Plan  DISCHARGE        Today's date: 2025  # of Exercise Sessions Completed: 6  Patient name: Kei Roth     : 1940       MRN: [id number]  Referring Physician: Jannie Covington MD  Pulmonologist: Jannie Covington MD (OUMAR Jay)  Provider: Morteza  Clinician: Avery Steele, MS, CEP, CCRP    Dx:    Encounter Diagnosis   Name Primary?    Chronic obstructive pulmonary disease, unspecified COPD type (HCC) Yes     Date of onset: 21      Treatment is tailored to this patient's individual needs.  The ITP was reviewed with the patient and all questions were answered to their satisfaction.  Additional ITP documentation can be found electronically including daily and monthly exercise summaries, daily session notes with ECG summaries, education notes, daily medication reconciliation, and daily physician supervision.      COMMENTS:      2025 ADDENDUM:   Jonathan called today to withdraw from pulmonary rehab.  States the exercise is exacerbating his back pain and was told by his pulmonologist he could discontinue rehab.  Jonathan was encouraged to keep moving at home. Jonathan attended 6 exercise sessions and did not meet his goals.  No changes otherwise to his care plan.          SUMMARY 2025    Resting BP  120/80 - 172/90,  HR 99 - 104  Exercise /60- 168/96.   - 125  Exercise session details:  30 minutes,  1.7-3.5 (Bike) METs  LO2:   Rest:  4 L/min, Exercise:  4L/min  SpO2: Rest:  93-97 %,  Exercise:  85-98 %  Dyspnea:   Rest:  0-5/10,  Exercise: 3-9/10  Home exercise/ADLs: none  Patient's subjective report of progress: Jonathan has been having difficulty breathing and orthopedic pain. He called out of rehab today, will further reassess with his return.   .    Pt is using 4L O2 with his home concentrator.  He is very frustrated that he needs to depend on people to help him with household tasks.  Has lost his  independence.  He has difficulty breathing from his nose when he tries to sleep will be seeing a specialist in the near future.  Jonathan walked 490ft in the 6MWT  on 4L O2with one 2:45 seated rest. BENITEZ 8/10, SpO2 remained 94-95%.        ASSESSMENT      Medical History:   Past Medical History:   Diagnosis Date    Arthritis     COPD (chronic obstructive pulmonary disease) (HCC)     Cough     Kidney stones     SOB (shortness of breath)     Wheezing        Family History:  Family History   Problem Relation Age of Onset    Heart failure Mother     Diabetes Mother     Other Mother         MENTAL DISORDER        Allergies:   Codeine, Cortisone, Penicillin g, and Penicillins    Current Medications:   Current Outpatient Medications   Medication Sig Dispense Refill    albuterol (2.5 mg/3 mL) 0.083 % nebulizer solution Take 3 mL (2.5 mg total) by nebulization 4 (four) times a day 1080 mL 3    fluticasone (FLONASE) 50 mcg/act nasal spray SPRAY 2 SPRAYS INTO EACH NOSTRIL EVERY DAY 48 mL 3    fluticasone-umeclidinium-vilanterol (Trelegy Ellipta) 200-62.5-25 mcg/actuation AEPB inhaler Inhale 1 puff daily Rinse mouth after use. 180 blister 0    hydrOXYzine HCL (ATARAX) 25 mg tablet Take 1 tablet (25 mg total) by mouth every 6 (six) hours as needed for itching 30 tablet 2    mirtazapine (REMERON) 7.5 MG tablet TAKE 1 TABLET (7.5 MG TOTAL) BY MOUTH DAILY AT BEDTIME 90 tablet 2    nabumetone (RELAFEN) 500 mg tablet TAKE 1 TABLET (500 MG TOTAL) BY MOUTH 2 (TWO) TIMES A DAY AS NEEDED (BACK PAIN) 60 tablet 5    omeprazole (PriLOSEC) 20 mg delayed release capsule Take 1 capsule (20 mg total) by mouth daily 90 capsule 5    rosuvastatin (CRESTOR) 20 MG tablet TAKE 1 TABLET BY MOUTH EVERY DAY 90 tablet 1    Ventolin  (90 Base) MCG/ACT inhaler INHALE 2 PUFFS EVERY 6 HOURS AS NEEDED FOR WHEEZING 18 g 1     No current facility-administered medications for this visit.       Physical Limitations: none    Fall Risk: Low   Comments: Ambulates  with a steady gait with no assist device and Denies a fall in the past 6 months    Cultural needs: none    PFT:  Yes     FEV1/FVC ratio of 62%   FEV1 of 31% predicted  severeobstruction.    Medication compliance: Yes  Comments: Pt reports to be compliant with medications      Pulmonary Disease Risk Factors:  occupational exposure to workplace  dusts, chemicals, fumes, aerosols, and asbestos    Sleep Disorders:   none      EXERCISE ASSESSMENT:      Initial Fitness Assessment: 6MWT:  Resting:    Resting:  BP: 128/78  HR: 101  SpO2: 98  Dyspnea: 3  O2 Use: 4 l/min, Exercise:  BP: 200/80  HR: 122  SpO2: 94-95%  Dyspnea: 8  O2 use: 4 l/min, METs:  1.7, ECG Summary: NSR, and Comments: one seated rest 2:45      Current Functional Status:  Occupation: retired from construction x 60 years  Recreation/Physical activity: fish, hunt ([facility]), yard work  - not in a while  ADL’s:limited by Dyspnea  [place]: No limitations  Exercise:  none  Home exercise equipment: none  Other Comments: doesn't go food shopping because he cannot walk around the store    SMART Exercise Goals:   reduced score on CAT, improved 6MWT distance, reduced dyspnea during exercise, improved exercise tolerance based on peak METs tolerated in pulmonary rehab exercise session, SpO2 >88% during exercise, and reduced RPD at rest    Patient Specific EXERCISE GOALS:     Increased independence  Ability to do more around the house with reduced SOB  More stamina/energy    PSYCHOSOCIAL ASSESSMENT:    Date of last assessment: 1/24/25  Depression screening:  PHQ-9 = 19    Interpretation:  15-19 = Moderately Severe Depression  Anxiety screening:  BETTYE-7 = 14    Interpretation: 10-14 = Moderate anxiety    Pt self-report of depression and anxiety   Patient has a history of anxiety when he can't breathe - now always carries his portable concentrator     Self-reported stress level:  3 - gets frustrated he can't do things, health  Stress Management: read, TV, and  "computer    Quality of Life Screen:  (Higher score indicates disease impact on QOL)  [facility] COOP score: 34/40      CAT: 17/40      Shortness of breath questionnaire: 81/120    Social Support:   spouse, daughter in law, and grandchildren, tenants, sister and nephew, friends  Community/Social Activities: none    SMART Goals:    Reduce perceived stress to 1-3/10, improved [facility] QOL < 27, PHQ-9 - reduced severity by one level, Feelings in [facility] Score < 3, Physical Fitness in [facility] Score < 3, Social Support in [facility] Score < 3, Daily Activity in [facility] Score < 3, Social Activities in [facility] Score < 3, Pain in [facility] Score < 3, Overall Health in [facility] Score < 3, Quality of Life in [facility] Score < 3 , Change in Health in [facility] Score < 3 ,  Increased interest and pleasure in doing things,  reduced time feeling down, depressed or hopeless, improved sleeping habits, feel less tired with more energy, Improved appetite control, reduced time feeling like a failure and having negative self thoughts, improved concentration, reduced incidence of restlessness and/or lethargy, reduced thoughts of self harm, reduced time feeling nervous or on edge, control or stop worrying, take time to relax, reduced feelings of restlessness, reduced irritability, and avoid thoughts of feeling as though something bad may happen    Patient Specific PSYCHOCOSOCIAL GOALS:    Increase his ability to do more around the house and not need so much help     Psychosocial Assessment as it relates to rehabilitation: Patient denies issues with his/her family or home life that may affect their rehabilitation efforts.       NUTRITION ASSESSMENT:    Initial Weight:  160.4  Current Weight: 160.4 lbs    Height:   Ht Readings from Last 1 Encounters:   03/03/25 5' 9\" (1.753 m)       Rate Your Plate Score: 48/81    Self-reported Current Dietary Habits:  Doesn't have a great appetite  Wife makes a nice supper  Coffee with " "cookies  Cream cheese/jelly toast  No protein until dinner  Vegs with dinner  Limited fruit  No a big meat eater    ETOH:   Social History     Substance and Sexual Activity   Alcohol Use Yes    Comment: OCCASIONAL        SMART Goals:   Improved Rate Your Plate score  >64, eat no more than 6oz of meat per day, eat poultry without the skin, eat meatless meals twice a week or more, rarely eat frozen desserts or choose fruit or fat-free sweets, use \"light\" tub margarine on bread, potatoes and vegetables, and rarely/never drink more than 1-2 alcoholic drinks per day.    Patient Specific NUTRITION GOALS:    improve hydration increased muscle mass        OTHER CORE COMPONENT ASSESSMENT:    Hospitalizations in the past year: none    Oxygen needs:   Rest:  supplemental O2 via nasal cannula @ 4L/min   Exercise/physical activity:  supplemental O2 via nasal cannula @ 4L/min   Sleep:   supplemental O2 via nasal cannula @ 4L/min     Does Pt monitor home SpO2? yes   Average SpO2 at rest:  98%, with O2   Average SpO2 with ADLs/physical activity:  unknown      Use of Rescue Inhaler: as needed    Use of Maintenance Inhaler: Yes:  1 times per day    Use of Nebulizer Treatments:  No    Patient practices breathing techniques at home:  Yes and Reviewed with patient on:  25    CAD Risk Factors:  Cholesterol: Yes  HTN: No  DM: No  Obesity: No     Smoking: Former user:  smoked cigars [id number]    SMART Core Component Goals:   medication compliance and demonstrate pursed lipped breathing    Patient Specific CORE COMPONENT GOALS:    monitor home pulse oximetry      Blood Pressure:    Restin//90  Exercise: 132//96  Recovery: 112//82      INDIVIDUALIZED TREATMENT PLAN    The patient is agreeable to attend 24 pulmonary rehab exercise sessions.      EXERCISE GOALS AND PLAN    PHYSCIAN PRESCRIBED EXERCISE    Current Aerobic Exercise Prescription       Frequency: 2 days/week   Supplement with home exercise 2+ days/wk as " tolerated        Minutes: 30         METS: 1.7-3.5              SpO2: 85-98%              RPD: 3-9                      HR:  104-125   RPE: 4-5         Modalities: UBE, NuStep, and Recumbent bike      Aerobic Exercise Prescription Plan for Progression:    Frequency: 2 days/week    Supplement with home exercise 2+ days/wk as tolerated       Minutes: 30-40        METS: 2.0-3.2              SpO2: >88%              RPD: 4-6                      HR:RHR +30-40bpm   RPE: 4-5        Modalities: Treadmill, UBE, NuStep, Recumbent bike, and Room walking        Supplemental Oxygen Needs with Exercise:  supplemental O2 3-4L/min via nasal canula.    Strength trainin-3 days / week  12-15 repetitions  1-2 sets per modality    Modalities: Leg Press, Chest Press, Pull Downs, and Arm Curl    Education: pursed lipped breathing, diaphragmatic breathing, home exercise guidelines, RPE scale, RPD scale, O2 saturation monitoring, and appropriate O2 response to exercise    Progress toward Exercise Goals:    Will continue to educate and progress as tolerated., will reassess with patients return to rehab    Exercise Plan:  Titrate supplemental oxygen as needed to maintain SpO2>88% with exercise, learn to conserve energy with ADLs , reduce time sitting at home, increased strength of respiratory muscles, utilize PLB with physical activity, begin a home exercise program , and After discharge patient will continue to follow a regular exercise regimen with the goal of a minimum of 150 minutes per week of moderate intensity exercise.      Readiness to change: Preparation:  (Getting ready to change)       NUTRITION GOALS AND PLAN    Progress toward Nutritional goals:    Will continue to educate and progress as tolerated., will reassess with patients return to rehab    Nutrition Plan: group class: Reading Food Labels, group Class: Heart Healthy Eating, limit meat intake to less than 6oz per day, eat poultry without the skin, eat more meatless  "meals, use \"light\" tub margarine, and drink no more than 1-2 alcoholic drinks in a day    SMART goals are based Rate Your Plate Dietary Self-Assessment. These are the areas in which the patient could score higher on the assessment.  Goals include recommendations for a heart healthy diet based on American Heart Association.    Nutrition Education: low sodium diet  maintaining hydration  nutrition for  lipid management    Readiness to change: Preparation:  (Getting ready to change)       PSYCHOSOCIAL GOALS AND PLAN    Information to begin using [employer] was provided as well as contact information for counseling through  [facility].    Progress toward Psychosocial goals:    Will continue to educate and progress as tolerated., will reassess with patients return to rehab    Psychosocial Plan: Class:  Stress and Pulmonary Disease, PHQ-9 >5 will refer to MD, Practice relaxation techniques, Spend time outdoors, Read a Book, Enjoy family, and Repeat PHQ-9 every 30 days if score >5    Psychosocial Education: benefits of a positive support system, stress management techniques, depression and pulmonary disease, and benefits of mental health counseling    Readiness to change: Contemplation:  (Acknowledging that there is a problem but not yet ready or sure of wanting to make a change)      OTHER CORE COMPONENTS GOALS AND PLAN    Tobacco Use Intervention:     N/A: Pt has a remote history of smoking    Oxygen Goal: Maintain SpO2>88% during exercise or as advised by pulmonolgist    Progress toward Core Component goals:    Will continue to educate and progress as tolerated., will reassess with patients return to rehab    Core Component Plan: group class: Understanding Heart Disease, group class: Common Heart Medications, medication compliance, avoid processed foods, engage in regular exercise, eliminate salt shaker at the table, use salt substitutes, check labels for sodium content, and monitor home BP    Core Component " Education:  pathophysiology of pulmonary disease, inspiratory muscle training, nebulizer use, and education: Oxygen    Readiness to change: Preparation:  (Getting ready to change)

## 2025-03-13 ENCOUNTER — RESULTS FOLLOW-UP (OUTPATIENT)
Dept: PULMONOLOGY | Facility: CLINIC | Age: 85
End: 2025-03-13

## 2025-03-25 ENCOUNTER — HOSPITAL ENCOUNTER (OUTPATIENT)
Dept: NON INVASIVE DIAGNOSTICS | Facility: CLINIC | Age: 85
Discharge: HOME | End: 2025-03-25
Payer: COMMERCIAL

## 2025-03-25 VITALS
HEIGHT: 69 IN | BODY MASS INDEX: 23.85 KG/M2 | HEART RATE: 85 BPM | WEIGHT: 161 LBS | DIASTOLIC BLOOD PRESSURE: 78 MMHG | SYSTOLIC BLOOD PRESSURE: 138 MMHG

## 2025-03-25 DIAGNOSIS — J44.9 CHRONIC OBSTRUCTIVE PULMONARY DISEASE, UNSPECIFIED COPD TYPE (HCC): ICD-10-CM

## 2025-03-25 DIAGNOSIS — R06.09 DOE (DYSPNEA ON EXERTION): ICD-10-CM

## 2025-03-25 LAB
BSA FOR ECHO PROCEDURE: 1.88 M2
E WAVE DECELERATION TIME: 151 MS
E/A RATIO: 0.48
FRACTIONAL SHORTENING: 37 (ref 28–44)
INTERVENTRICULAR SEPTUM IN DIASTOLE (PARASTERNAL SHORT AXIS VIEW): 0.9 CM
INTERVENTRICULAR SEPTUM: 0.9 CM (ref 0.6–1.1)
LAAS-AP2: 10.5 CM2
LAAS-AP4: 12.1 CM2
LEFT ATRIUM VOLUME (MOD BIPLANE): 23 ML
LEFT ATRIUM VOLUME INDEX (MOD BIPLANE): 12.2 ML/M2
LEFT INTERNAL DIMENSION IN SYSTOLE: 2.6 CM (ref 2.1–4)
LEFT VENTRICULAR INTERNAL DIMENSION IN DIASTOLE: 4.1 CM (ref 3.5–6)
LEFT VENTRICULAR POSTERIOR WALL IN END DIASTOLE: 1 CM
LEFT VENTRICULAR STROKE VOLUME: 51 ML
LV EF US.2D.A4C+ESTIMATED: 58 %
LVSV (TEICH): 51 ML
MV E'TISSUE VEL-SEP: 4 CM/S
MV PEAK A VEL: 0.95 M/S
MV PEAK E VEL: 46 CM/S
MV STENOSIS PRESSURE HALF TIME: 44 MS
MV VALVE AREA P 1/2 METHOD: 5
RIGHT ATRIUM AREA SYSTOLE A4C: 11 CM2
RIGHT VENTRICLE ID DIMENSION: 3.4 CM
SL CV LEFT ATRIUM LENGTH A2C: 4.3 CM
SL CV LV EF: 58
SL CV PED ECHO LEFT VENTRICLE DIASTOLIC VOLUME (MOD BIPLANE) 2D: 75 ML
SL CV PED ECHO LEFT VENTRICLE SYSTOLIC VOLUME (MOD BIPLANE) 2D: 25 ML
TRICUSPID ANNULAR PLANE SYSTOLIC EXCURSION: 2 CM

## 2025-03-25 PROCEDURE — 93306 TTE W/DOPPLER COMPLETE: CPT

## 2025-03-25 PROCEDURE — 93306 TTE W/DOPPLER COMPLETE: CPT | Performed by: INTERNAL MEDICINE

## 2025-03-26 ENCOUNTER — RA CDI HCC (OUTPATIENT)
Dept: OTHER | Facility: HOSPITAL | Age: 85
End: 2025-03-26

## 2025-03-27 ENCOUNTER — RESULTS FOLLOW-UP (OUTPATIENT)
Dept: PULMONOLOGY | Facility: CLINIC | Age: 85
End: 2025-03-27

## 2025-03-28 ENCOUNTER — APPOINTMENT (OUTPATIENT)
Age: 85
End: 2025-03-28
Payer: COMMERCIAL

## 2025-03-28 DIAGNOSIS — E78.5 HYPERLIPIDEMIA, UNSPECIFIED HYPERLIPIDEMIA TYPE: ICD-10-CM

## 2025-03-28 DIAGNOSIS — R73.03 PREDIABETES: ICD-10-CM

## 2025-03-28 LAB
ALBUMIN SERPL BCG-MCNC: 4.2 G/DL (ref 3.5–5)
ALP SERPL-CCNC: 72 U/L (ref 34–104)
ALT SERPL W P-5'-P-CCNC: 17 U/L (ref 7–52)
ANION GAP SERPL CALCULATED.3IONS-SCNC: 3 MMOL/L (ref 4–13)
AST SERPL W P-5'-P-CCNC: 19 U/L (ref 13–39)
BASOPHILS # BLD AUTO: 0.04 THOUSANDS/ÂΜL (ref 0–0.1)
BASOPHILS NFR BLD AUTO: 1 % (ref 0–1)
BILIRUB SERPL-MCNC: 0.43 MG/DL (ref 0.2–1)
BUN SERPL-MCNC: 16 MG/DL (ref 5–25)
CALCIUM SERPL-MCNC: 9.4 MG/DL (ref 8.4–10.2)
CHLORIDE SERPL-SCNC: 99 MMOL/L (ref 96–108)
CHOLEST SERPL-MCNC: 132 MG/DL (ref ?–200)
CO2 SERPL-SCNC: 40 MMOL/L (ref 21–32)
CREAT SERPL-MCNC: 1.11 MG/DL (ref 0.6–1.3)
EOSINOPHIL # BLD AUTO: 0.42 THOUSAND/ÂΜL (ref 0–0.61)
EOSINOPHIL NFR BLD AUTO: 5 % (ref 0–6)
ERYTHROCYTE [DISTWIDTH] IN BLOOD BY AUTOMATED COUNT: 12.9 % (ref 11.6–15.1)
EST. AVERAGE GLUCOSE BLD GHB EST-MCNC: 126 MG/DL
GFR SERPL CREATININE-BSD FRML MDRD: 61 ML/MIN/1.73SQ M
GLUCOSE P FAST SERPL-MCNC: 105 MG/DL (ref 65–99)
HBA1C MFR BLD: 6 %
HCT VFR BLD AUTO: 46.9 % (ref 36.5–49.3)
HDLC SERPL-MCNC: 54 MG/DL
HGB BLD-MCNC: 15.1 G/DL (ref 12–17)
IMM GRANULOCYTES # BLD AUTO: 0.04 THOUSAND/UL (ref 0–0.2)
IMM GRANULOCYTES NFR BLD AUTO: 1 % (ref 0–2)
LDLC SERPL CALC-MCNC: 52 MG/DL (ref 0–100)
LYMPHOCYTES # BLD AUTO: 1.29 THOUSANDS/ÂΜL (ref 0.6–4.47)
LYMPHOCYTES NFR BLD AUTO: 16 % (ref 14–44)
MCH RBC QN AUTO: 31.1 PG (ref 26.8–34.3)
MCHC RBC AUTO-ENTMCNC: 32.2 G/DL (ref 31.4–37.4)
MCV RBC AUTO: 97 FL (ref 82–98)
MONOCYTES # BLD AUTO: 0.69 THOUSAND/ÂΜL (ref 0.17–1.22)
MONOCYTES NFR BLD AUTO: 9 % (ref 4–12)
NEUTROPHILS # BLD AUTO: 5.63 THOUSANDS/ÂΜL (ref 1.85–7.62)
NEUTS SEG NFR BLD AUTO: 68 % (ref 43–75)
NONHDLC SERPL-MCNC: 78 MG/DL
NRBC BLD AUTO-RTO: 0 /100 WBCS
PLATELET # BLD AUTO: 149 THOUSANDS/UL (ref 149–390)
PMV BLD AUTO: 11.7 FL (ref 8.9–12.7)
POTASSIUM SERPL-SCNC: 4.7 MMOL/L (ref 3.5–5.3)
PROT SERPL-MCNC: 6.8 G/DL (ref 6.4–8.4)
RBC # BLD AUTO: 4.86 MILLION/UL (ref 3.88–5.62)
SODIUM SERPL-SCNC: 142 MMOL/L (ref 135–147)
TRIGL SERPL-MCNC: 131 MG/DL (ref ?–150)
WBC # BLD AUTO: 8.11 THOUSAND/UL (ref 4.31–10.16)

## 2025-03-28 PROCEDURE — 83036 HEMOGLOBIN GLYCOSYLATED A1C: CPT

## 2025-03-28 PROCEDURE — 80053 COMPREHEN METABOLIC PANEL: CPT

## 2025-03-28 PROCEDURE — 85025 COMPLETE CBC W/AUTO DIFF WBC: CPT

## 2025-03-28 PROCEDURE — 36415 COLL VENOUS BLD VENIPUNCTURE: CPT

## 2025-03-28 PROCEDURE — 80061 LIPID PANEL: CPT

## 2025-04-01 ENCOUNTER — OFFICE VISIT (OUTPATIENT)
Dept: FAMILY MEDICINE CLINIC | Facility: CLINIC | Age: 85
End: 2025-04-01
Payer: COMMERCIAL

## 2025-04-01 VITALS
TEMPERATURE: 97.9 F | BODY MASS INDEX: 24.14 KG/M2 | HEIGHT: 69 IN | OXYGEN SATURATION: 96 % | HEART RATE: 64 BPM | WEIGHT: 163 LBS | SYSTOLIC BLOOD PRESSURE: 130 MMHG | DIASTOLIC BLOOD PRESSURE: 88 MMHG

## 2025-04-01 DIAGNOSIS — F32.9 REACTIVE DEPRESSION: ICD-10-CM

## 2025-04-01 DIAGNOSIS — M51.9 LUMBAR DISC DISEASE: ICD-10-CM

## 2025-04-01 DIAGNOSIS — J96.11 CHRONIC HYPOXEMIC RESPIRATORY FAILURE (HCC): ICD-10-CM

## 2025-04-01 DIAGNOSIS — Z12.5 SCREENING FOR PROSTATE CANCER: ICD-10-CM

## 2025-04-01 DIAGNOSIS — R73.03 PREDIABETES: ICD-10-CM

## 2025-04-01 DIAGNOSIS — K21.9 GASTROESOPHAGEAL REFLUX DISEASE, UNSPECIFIED WHETHER ESOPHAGITIS PRESENT: ICD-10-CM

## 2025-04-01 DIAGNOSIS — N18.31 STAGE 3A CHRONIC KIDNEY DISEASE (HCC): ICD-10-CM

## 2025-04-01 DIAGNOSIS — J43.2 CENTRILOBULAR EMPHYSEMA (HCC): Primary | ICD-10-CM

## 2025-04-01 DIAGNOSIS — E78.5 HYPERLIPIDEMIA, UNSPECIFIED HYPERLIPIDEMIA TYPE: ICD-10-CM

## 2025-04-01 PROCEDURE — 99214 OFFICE O/P EST MOD 30 MIN: CPT | Performed by: FAMILY MEDICINE

## 2025-04-01 PROCEDURE — G2211 COMPLEX E/M VISIT ADD ON: HCPCS | Performed by: FAMILY MEDICINE

## 2025-04-01 NOTE — PROGRESS NOTES
Depression Screening and Follow-up Plan: Patient was screened for depression during today's encounter. They screened negative with a PHQ-9 score of 1.      Assessment/Plan:  Return visit in 6 months with fasting blood prior to visit.       Problem List Items Addressed This Visit       Centrilobular emphysema (HCC) - Primary    Continue Trelegy Ellipta and albuterol.  Follow-up with pulmonology         Chronic hypoxemic respiratory failure (HCC)    Gastroesophageal reflux disease    Continue Prilosec 20 mg daily         Hyperlipidemia    Continue Crestor 20 mg daily         Relevant Orders    CBC and differential    Comprehensive metabolic panel    Lipid panel    Lumbar disc disease    Continue Relafen 500 mg as needed         Prediabetes    Relevant Orders    Hemoglobin A1C    Reactive depression    Stage 3a chronic kidney disease (HCC)     Other Visit Diagnoses         Screening for prostate cancer        Relevant Orders    PSA, Total Screen              Subjective:      Patient ID: Kei Roth is a 84 y.o. male.    Patient comes in for checkup.  He remains on continuous oxygen.  He has stopped taking Remeron and denies depression        The following portions of the patient's history were reviewed and updated as appropriate:   Past Medical History:  He has a past medical history of Arthritis, COPD (chronic obstructive pulmonary disease) (HCC), Cough, Kidney stones, SOB (shortness of breath), and Wheezing.,  _______________________________________________________________________  Medical Problems:  does not have any pertinent problems on file.,  _______________________________________________________________________  Past Surgical History:   has a past surgical history that includes Colonoscopy (03/09/2011); Abdominal aortic aneurysm repair; and Hand surgery.,  _______________________________________________________________________  Family History:  family history includes Diabetes in his mother; Heart  failure in his mother; Other in his mother.,  _______________________________________________________________________  Social History:   reports that he quit smoking about 11 years ago. His smoking use included cigars. He started smoking about 56 years ago. He has been exposed to tobacco smoke. He quit smokeless tobacco use about 71 years ago.  His smokeless tobacco use included chew. He reports current alcohol use. He reports that he does not use drugs.,  _______________________________________________________________________  Allergies:  is allergic to codeine, cortisone, penicillin g, and penicillins..  _______________________________________________________________________  Current Outpatient Medications   Medication Sig Dispense Refill    albuterol (2.5 mg/3 mL) 0.083 % nebulizer solution Take 3 mL (2.5 mg total) by nebulization 4 (four) times a day 1080 mL 3    fluticasone (FLONASE) 50 mcg/act nasal spray SPRAY 2 SPRAYS INTO EACH NOSTRIL EVERY DAY 48 mL 3    fluticasone-umeclidinium-vilanterol (Trelegy Ellipta) 200-62.5-25 mcg/actuation AEPB inhaler Inhale 1 puff daily Rinse mouth after use. 180 blister 0    hydrOXYzine HCL (ATARAX) 25 mg tablet Take 1 tablet (25 mg total) by mouth every 6 (six) hours as needed for itching 30 tablet 2    nabumetone (RELAFEN) 500 mg tablet TAKE 1 TABLET (500 MG TOTAL) BY MOUTH 2 (TWO) TIMES A DAY AS NEEDED (BACK PAIN) 60 tablet 5    omeprazole (PriLOSEC) 20 mg delayed release capsule Take 1 capsule (20 mg total) by mouth daily 90 capsule 5    rosuvastatin (CRESTOR) 20 MG tablet TAKE 1 TABLET BY MOUTH EVERY DAY 90 tablet 1    Ventolin  (90 Base) MCG/ACT inhaler INHALE 2 PUFFS EVERY 6 HOURS AS NEEDED FOR WHEEZING 18 g 1     No current facility-administered medications for this visit.     _______________________________________________________________________  Review of Systems   Constitutional: Negative.    Respiratory:  Positive for shortness of breath.    Cardiovascular:  "Negative.    Gastrointestinal: Negative.          Objective:  Vitals:    04/01/25 0901   BP: 130/88   BP Location: Left arm   Patient Position: Sitting   Cuff Size: Standard   Pulse: 64   Temp: 97.9 °F (36.6 °C)   TempSrc: Temporal   SpO2: 96%   Weight: 73.9 kg (163 lb)   Height: 5' 9\" (1.753 m)     Body mass index is 24.07 kg/m².     Physical Exam  Constitutional:       Appearance: Normal appearance. He is well-developed.   HENT:      Head: Normocephalic and atraumatic.   Eyes:      Pupils: Pupils are equal, round, and reactive to light.   Cardiovascular:      Rate and Rhythm: Normal rate and regular rhythm.      Heart sounds: Normal heart sounds.   Pulmonary:      Effort: Pulmonary effort is normal.      Comments: Decreased breath sounds  Musculoskeletal:         General: Normal range of motion.      Cervical back: Neck supple.   Skin:     General: Skin is warm and dry.   Neurological:      Mental Status: He is alert.   Psychiatric:         Mood and Affect: Mood normal.         Behavior: Behavior normal.         "

## 2025-04-02 ENCOUNTER — HOSPITAL ENCOUNTER (OUTPATIENT)
Dept: PULMONOLOGY | Facility: HOSPITAL | Age: 85
Discharge: HOME | End: 2025-04-02
Payer: COMMERCIAL

## 2025-04-02 DIAGNOSIS — J44.9 CHRONIC OBSTRUCTIVE PULMONARY DISEASE, UNSPECIFIED COPD TYPE (HCC): ICD-10-CM

## 2025-04-02 PROCEDURE — 94760 N-INVAS EAR/PLS OXIMETRY 1: CPT

## 2025-04-02 PROCEDURE — 94060 EVALUATION OF WHEEZING: CPT

## 2025-04-02 PROCEDURE — 94726 PLETHYSMOGRAPHY LUNG VOLUMES: CPT

## 2025-04-02 PROCEDURE — 94060 EVALUATION OF WHEEZING: CPT | Performed by: INTERNAL MEDICINE

## 2025-04-02 PROCEDURE — 94726 PLETHYSMOGRAPHY LUNG VOLUMES: CPT | Performed by: INTERNAL MEDICINE

## 2025-04-02 PROCEDURE — 94729 DIFFUSING CAPACITY: CPT

## 2025-04-02 PROCEDURE — 94729 DIFFUSING CAPACITY: CPT | Performed by: INTERNAL MEDICINE

## 2025-04-02 RX ORDER — ALBUTEROL SULFATE 0.83 MG/ML
2.5 SOLUTION RESPIRATORY (INHALATION) ONCE
Status: COMPLETED | OUTPATIENT
Start: 2025-04-02 | End: 2025-04-02

## 2025-04-02 RX ADMIN — ALBUTEROL SULFATE 2.5 MG: 2.5 SOLUTION RESPIRATORY (INHALATION) at 10:27

## 2025-04-03 ENCOUNTER — RESULTS FOLLOW-UP (OUTPATIENT)
Dept: PULMONOLOGY | Facility: CLINIC | Age: 85
End: 2025-04-03

## 2025-04-15 ENCOUNTER — OFFICE VISIT (OUTPATIENT)
Age: 85
End: 2025-04-15
Payer: COMMERCIAL

## 2025-04-15 VITALS
BODY MASS INDEX: 24.29 KG/M2 | DIASTOLIC BLOOD PRESSURE: 74 MMHG | HEIGHT: 69 IN | TEMPERATURE: 97.6 F | SYSTOLIC BLOOD PRESSURE: 140 MMHG | HEART RATE: 98 BPM | OXYGEN SATURATION: 94 % | WEIGHT: 164 LBS

## 2025-04-15 DIAGNOSIS — J43.2 CENTRILOBULAR EMPHYSEMA (HCC): Primary | ICD-10-CM

## 2025-04-15 DIAGNOSIS — J96.11 CHRONIC HYPOXEMIC RESPIRATORY FAILURE (HCC): ICD-10-CM

## 2025-04-15 DIAGNOSIS — J41.0 SIMPLE CHRONIC BRONCHITIS (HCC): ICD-10-CM

## 2025-04-15 PROCEDURE — 99214 OFFICE O/P EST MOD 30 MIN: CPT | Performed by: INTERNAL MEDICINE

## 2025-04-15 NOTE — ASSESSMENT & PLAN NOTE
Centrilobular emphysema with chronic hypoxemic respiratory failure on continuous oxygen was on 3 L currently requiring 6 L on exertion  Orders:    Portable Concentrators  Severe COPD with PFTs in 2021 demonstrating FEV1 of 31%  Patient continues to be on Trelegy 1 puff daily  Rinse mouth after use  Continue with albuterol via the nebulizer 4 times daily as needed  Repeat PFTs recently done demonstrating FEV1 of 19%, air trapping and hyperinflation and severely decreased DLCO of 32%

## 2025-04-15 NOTE — PROGRESS NOTES
Follow-up  Visit - Pulmonary Medicine   Name: Kei Roth      : 1940      MRN: [id number]  Encounter Provider: Jannie Covington MD  Encounter Date: 4/15/2025   Encounter department: [facility] PULMONARY [facility]  :  Assessment & Plan  Centrilobular emphysema (HCC)  Centrilobular emphysema with chronic hypoxemic respiratory failure on continuous oxygen was on 3 L currently requiring 6 L on exertion  Orders:    Portable Concentrators  Severe COPD with PFTs in  demonstrating FEV1 of 31%  Patient continues to be on Trelegy 1 puff daily  Rinse mouth after use  Continue with albuterol via the nebulizer 4 times daily as needed  Repeat PFTs recently done demonstrating FEV1 of 19%, air trapping and hyperinflation and severely decreased DLCO of 32%    Chronic hypoxemic respiratory failure (HCC)  Chronic hypoxemic respiratory failure currently requiring 3 L at rest and 4 -5 L on exertion  Orders:    Portable Concentrators    Simple chronic bronchitis (HCC)  Chronic simple bronchitis continues on Trelegy 1 puff daily and albuterol MDI 2 puffs 4 times daily as needed also has a nebulizer machine with albuterol         No follow-ups on file.    History of Present Illness   Kei Roth is a 84 y.o. male who presents for follow-up    Review of Systems   Constitutional: Negative.    HENT: Negative.     Eyes: Negative.    Respiratory:  Positive for cough and shortness of breath.    Cardiovascular: Negative.    Gastrointestinal: Negative.    Endocrine: Negative.    Genitourinary: Negative.    Musculoskeletal: Negative.    Allergic/Immunologic: Negative.    Neurological: Negative.    Psychiatric/Behavioral: Negative.         Aside from what is mentioned in the HPI, ROS is otherwise negative    Medical History Reviewed by provider this encounter:  Problems     .     Medical History Reviewed by provider this encounter:     .    Objective   /74   Pulse 98   Temp 97.6 °F (36.4 °C)    "Ht 5' 9\" (1.753 m)   Wt 74.4 kg (164 lb)   SpO2 94% Comment: with 3 lts of oxygen  BMI 24.22 kg/m²     Physical Exam  Vitals and nursing note reviewed.   Constitutional:       Appearance: He is well-developed.   HENT:      Head: Normocephalic and atraumatic.   Eyes:      Conjunctiva/sclera: Conjunctivae normal.      Pupils: Pupils are equal, round, and reactive to light.   Neck:      Thyroid: No thyromegaly.      Vascular: No JVD.   Cardiovascular:      Rate and Rhythm: Normal rate and regular rhythm.      Heart sounds: Normal heart sounds. No murmur heard.     No friction rub. No gallop.   Pulmonary:      Effort: Pulmonary effort is normal. No respiratory distress.      Breath sounds: Normal breath sounds. No wheezing or rales.   Chest:      Chest wall: No tenderness.   Musculoskeletal:         General: No tenderness or deformity. Normal range of motion.      Cervical back: Normal range of motion and neck supple.   Lymphadenopathy:      Cervical: No cervical adenopathy.   Skin:     General: Skin is warm and dry.   Neurological:      Mental Status: He is alert and oriented to person, place, and time.           Diagnostic Data:  Labs: I personally reviewed the most recent laboratory data pertinent to today's visit.      Radiology results:  Radiology Results Review : No pertinent imaging studies reviewed.      PFT/spirometry results:  PFTs recently done in April 2025 demonstrating very severe obstructive airflow limitation with FEV1 of 19% increased lung volumes with air trapping and hyperinflation and severely decreased DLCO of 32%      Jannie Covington MD      "

## 2025-04-15 NOTE — ASSESSMENT & PLAN NOTE
Chronic simple bronchitis continues on Trelegy 1 puff daily and albuterol MDI 2 puffs 4 times daily as needed also has a nebulizer machine with albuterol

## 2025-04-15 NOTE — ASSESSMENT & PLAN NOTE
Chronic hypoxemic respiratory failure currently requiring 3 L at rest and 4 -5 L on exertion  Orders:    Portable Concentrators

## 2025-04-16 ENCOUNTER — TELEPHONE (OUTPATIENT)
Age: 85
End: 2025-04-16

## 2025-04-16 DIAGNOSIS — J43.2 CENTRILOBULAR EMPHYSEMA (HCC): Primary | ICD-10-CM

## 2025-04-16 LAB
DME PARACHUTE DELIVERY DATE REQUESTED: NORMAL
DME PARACHUTE DELIVERY DATE REQUESTED: NORMAL
DME PARACHUTE ITEM DESCRIPTION: NORMAL
DME PARACHUTE ORDER STATUS: NORMAL
DME PARACHUTE ORDER STATUS: NORMAL
DME PARACHUTE SUPPLIER NAME: NORMAL
DME PARACHUTE SUPPLIER NAME: NORMAL
DME PARACHUTE SUPPLIER PHONE: NORMAL
DME PARACHUTE SUPPLIER PHONE: NORMAL

## 2025-04-17 NOTE — TELEPHONE ENCOUNTER
Dr. Covington / Gabrielle    O2 Order 6L    Michelle from [facility] called pt to advise his current O2 system would need to be changed out to O2 tanks due to increase to 6L. Pt became upset and refused to change out curernt system to O2 tanks.

## 2025-04-18 NOTE — TELEPHONE ENCOUNTER
If he does not want to be switching the current O2 system that he has then we will have to be left on the portable concentrator that goes only up to 4 L recently when he was made to walk he needed more than 4 L was going up to 6 L for which he was asked to switch the oxygen system to the portable tanks if he still refuses then opt it is up to the patient

## 2025-04-24 ENCOUNTER — TELEPHONE (OUTPATIENT)
Age: 85
End: 2025-04-24

## 2025-04-25 ENCOUNTER — HOSPITAL ENCOUNTER (OUTPATIENT)
Dept: CT IMAGING | Facility: HOSPITAL | Age: 85
End: 2025-04-25
Attending: INTERNAL MEDICINE
Payer: COMMERCIAL

## 2025-04-25 DIAGNOSIS — J43.2 CENTRILOBULAR EMPHYSEMA (HCC): ICD-10-CM

## 2025-04-25 PROCEDURE — 71250 CT THORAX DX C-: CPT

## 2025-04-29 DIAGNOSIS — E78.5 HYPERLIPIDEMIA, UNSPECIFIED HYPERLIPIDEMIA TYPE: ICD-10-CM

## 2025-04-30 RX ORDER — ROSUVASTATIN CALCIUM 20 MG/1
20 TABLET, COATED ORAL DAILY
Qty: 90 TABLET | Refills: 1 | Status: SHIPPED | OUTPATIENT
Start: 2025-04-30

## 2025-05-01 ENCOUNTER — RESULTS FOLLOW-UP (OUTPATIENT)
Age: 85
End: 2025-05-01

## 2025-05-01 DIAGNOSIS — R91.1 LUNG NODULE: Primary | ICD-10-CM

## 2025-05-02 NOTE — TELEPHONE ENCOUNTER
Called pt back and informed him of providers note above.He verbalizes understanding and states he was wondering about the [place] valve and has questions about it.

## 2025-05-05 NOTE — TELEPHONE ENCOUNTER
Patient calling back, stating he called on Friday and no one has gotten back to him.  He is asking if he would be a candidate for the [place] valve.    Patient requesting a call back today

## 2025-05-12 ENCOUNTER — TELEPHONE (OUTPATIENT)
Age: 85
End: 2025-05-12

## 2025-05-12 NOTE — TELEPHONE ENCOUNTER
Called and left a message for the patient, that the coordinator from Dr. Blackwood's office will call for scheduling the appointment for evaluation for the [place] valve

## 2025-05-13 NOTE — TELEPHONE ENCOUNTER
Patient is returning Dr. Covington's call from yesterday. I relayed to patient that Dr. Covington had called to inform him that the coordinator from Dr. Blackwood's office will contact him to schedule an evaluation for the [place] Valve.      Please advise.

## 2025-05-16 ENCOUNTER — DOCUMENTATION (OUTPATIENT)
Dept: PULMONOLOGY | Facility: CLINIC | Age: 85
End: 2025-05-16

## 2025-05-19 DIAGNOSIS — J44.9 CHRONIC OBSTRUCTIVE PULMONARY DISEASE, UNSPECIFIED COPD TYPE (HCC): ICD-10-CM

## 2025-05-19 RX ORDER — FLUTICASONE FUROATE, UMECLIDINIUM BROMIDE AND VILANTEROL TRIFENATATE 200; 62.5; 25 UG/1; UG/1; UG/1
1 POWDER RESPIRATORY (INHALATION) DAILY
Qty: 180 BLISTER | Refills: 0 | Status: SHIPPED | OUTPATIENT
Start: 2025-05-19 | End: 2025-08-17

## 2025-05-19 NOTE — TELEPHONE ENCOUNTER
Reason for call:   [x] Refill   [] Prior Auth  [] Other:     Office:   [] PCP/Provider -   [x] Specialty/Provider - Nafisa Shelley,     Medication: (Trelegy Ellipta) 200-62.5-25 mcg/     Dose/Frequency: inhale 1 puff daily    Quantity: 180 blister    Pharmacy: [facility]     Local Pharmacy   Does the patient have enough for 3 days?   [] Yes   [x] No - Send as HP to POD    Mail Away Pharmacy   Does the patient have enough for 10 days?   [] Yes   [] No - Send as HP to POD

## 2025-05-29 ENCOUNTER — TELEPHONE (OUTPATIENT)
Age: 85
End: 2025-05-29

## 2025-05-29 NOTE — TELEPHONE ENCOUNTER
Patient is calling in regards to his oxygen. Patient prefers to stay on portable oxygen instead of switching to the tank. Please call patient to update him if this can happen or explain the need for the change.      Please advise.

## 2025-06-11 ENCOUNTER — OFFICE VISIT (OUTPATIENT)
Dept: PULMONOLOGY | Facility: CLINIC | Age: 85
End: 2025-06-11
Payer: COMMERCIAL

## 2025-06-11 ENCOUNTER — HOSPITAL ENCOUNTER (OUTPATIENT)
Dept: PULMONOLOGY | Facility: HOSPITAL | Age: 85
Discharge: HOME | End: 2025-06-11
Attending: INTERNAL MEDICINE
Payer: COMMERCIAL

## 2025-06-11 VITALS
TEMPERATURE: 98 F | WEIGHT: 160.4 LBS | OXYGEN SATURATION: 99 % | DIASTOLIC BLOOD PRESSURE: 72 MMHG | BODY MASS INDEX: 23.76 KG/M2 | HEIGHT: 69 IN | SYSTOLIC BLOOD PRESSURE: 148 MMHG | HEART RATE: 71 BPM | RESPIRATION RATE: 12 BRPM

## 2025-06-11 DIAGNOSIS — J43.2 CENTRILOBULAR EMPHYSEMA (HCC): ICD-10-CM

## 2025-06-11 DIAGNOSIS — E78.5 HYPERLIPIDEMIA, UNSPECIFIED HYPERLIPIDEMIA TYPE: ICD-10-CM

## 2025-06-11 DIAGNOSIS — J96.11 CHRONIC HYPOXEMIC RESPIRATORY FAILURE (HCC): Primary | ICD-10-CM

## 2025-06-11 DIAGNOSIS — J41.0 SIMPLE CHRONIC BRONCHITIS (HCC): ICD-10-CM

## 2025-06-11 LAB
ARTERIAL PATENCY WRIST A: ABNORMAL
BASE EXCESS BLDA CALC-SCNC: 8 MMOL/L (ref -2–3)
CA-I BLD-SCNC: 1.25 MMOL/L (ref 1.12–1.32)
FIO2 GAS DIL.REBREATH: 21 L
GLUCOSE SERPL-MCNC: 110 MG/DL (ref 65–140)
HCO3 BLDA-SCNC: 34.6 MMOL/L (ref 22–28)
HCT VFR BLD CALC: 41 % (ref 36.5–49.3)
HGB BLDA-MCNC: 13.9 G/DL (ref 12–17)
PCO2 BLD: 36 MMOL/L (ref 21–32)
PCO2 BLD: 53.8 MM HG (ref 36–44)
PH BLD: 7.42 [PH] (ref 7.35–7.45)
PO2 BLD: 53 MM HG (ref 75–129)
POTASSIUM BLD-SCNC: 4.4 MMOL/L (ref 3.5–5.3)
SAMPLE SITE: ABNORMAL
SAO2 % BLD FROM PO2: 87 % (ref 60–85)
SODIUM BLD-SCNC: 139 MMOL/L (ref 136–145)
SPECIMEN SOURCE: ABNORMAL

## 2025-06-11 PROCEDURE — 85014 HEMATOCRIT: CPT

## 2025-06-11 PROCEDURE — 94618 PULMONARY STRESS TESTING: CPT | Performed by: INTERNAL MEDICINE

## 2025-06-11 PROCEDURE — 82803 BLOOD GASES ANY COMBINATION: CPT

## 2025-06-11 PROCEDURE — 84295 ASSAY OF SERUM SODIUM: CPT

## 2025-06-11 PROCEDURE — 82947 ASSAY GLUCOSE BLOOD QUANT: CPT

## 2025-06-11 PROCEDURE — 36600 WITHDRAWAL OF ARTERIAL BLOOD: CPT

## 2025-06-11 PROCEDURE — 84132 ASSAY OF SERUM POTASSIUM: CPT

## 2025-06-11 PROCEDURE — 82330 ASSAY OF CALCIUM: CPT

## 2025-06-11 PROCEDURE — 99215 OFFICE O/P EST HI 40 MIN: CPT | Performed by: INTERNAL MEDICINE

## 2025-06-11 NOTE — ASSESSMENT & PLAN NOTE
Patient requires 4 L at rest and 6 L with exertion.  Room air PO2 is 54, which is acceptable.    Orders:    POCT 6 minute walk    Blood gas, arterial; Future    NM lung quantative perfusion w spect ct; Future

## 2025-06-11 NOTE — LETTER
2025     Jannie Covington MD  [address]  [address]  [place] [address]    Patient: Kei Roth   YOB: 1940   Date of Visit: 2025       Dear MD Gasper Mares MD:    Thank you for referring Kei Roth to me for evaluation. Below are my notes for this consultation.    If you have questions, please do not hesitate to call me. I look forward to following your patient along with you.         Sincerely,        Alona Blackwood DO        CC: MD Alona Shoemaker DO  2025 10:28 AM  Sign when Signing Visit  Follow-up  Visit - Pulmonary Medicine   Name: Kei Roth      : 1940      MRN: [id number]  Encounter Provider: Alona Blackwood DO  Encounter Date: 2025   Encounter department: [facility] PULMONARY [facility]  :  Assessment & Plan  Centrilobular emphysema (HCC)  Patient has severe COPD/emphysema with hyperinflation and remains symptomatic despite medical therapy with Trelegy Ellipta and albuterol as needed.  He was unable to complete pulmonary habilitation, largely due to joint pains.  I emphasized the importance of being active within the home, particularly as we consider moving forward with bronchoscopic lung volume reduction.  He has completed much of the workup and appears to be a candidate for treatment targeting either right or left lower lobe.  He will need perfusion scan to help determine final target.  With that said, he is at somewhat higher risk of having complications particularly given his oxygen need and borderline hypercapnia.  He understands that risks of pneumothorax, worsening hypoxia, COPD exacerbation and pneumonia are significant considerations.  He further understands that in the Liberate trial, there was a 3% risk of death.  We also discussed that up to 20% of patients will require a revision.  He would like to move forward.  We will coordinate scheduling after final  testing is complete.    Given lower lobe predominant disease, patient should have an alpha-1 antitrypsin checked if not already done so.  I do not see any testing in our records.    Orders:  •  NM lung quantative perfusion w spect ct; Future    Chronic hypoxemic respiratory failure (HCC)  Patient requires 4 L at rest and 6 L with exertion.  Room air PO2 is 54, which is acceptable.    Orders:  •  POCT 6 minute walk  •  Blood gas, arterial; Future  •  NM lung quantative perfusion w spect ct; Future      Referring provider: Dr. Covington    History of Present Illness  HPI:  Kei Roth is a 84 y.o. male who is here to discuss bronchoscopic lung volume reduction.  Patient was initially diagnosed with COPD greater than 10 years ago and has been using supplemental oxygen for the past 5 or so years.  He follows with Dr. Covington from pulmonary perspective.  Patient has been troubled with shortness of breath which he feels has become worse over the past 2 years.  He cannot recall an inciting episode that caused a worsening of his symptoms.  He is compliant with once daily and uses albuterol as needed.  He has episodes of cough without significant sputum production.  Denies significant wheezing.  He has not been hospitalized for COPD.  He uses oxygen at 4 L/min continuously.  He does not sleep with BiPAP or CPAP.  He had been tried on nebulized steroids in the past which caused significant side effects.  Patient briefly enrolled in pulmonary rehabilitation earlier this year, but was unable to continue participation due to severe joint pains.  He does not follow with a rheumatologist.  He has no lower extremity edema.  Patient's most notable symptom is shortness of breath with even minimal exertion.  He is able to mow the lawn on a riding mower but admits to not being very active in the home.    Review of Systems otherwise negative    Historical Information  Past Medical History[1]  Past Surgical History[2]  Family  "History[3]    Occupational History: Retired construction    Tobacco Use History[4]    Meds/Allergies  Current Medications[5]  Allergies[6]    Vitals: Blood pressure 148/72, pulse 71, temperature 98 °F (36.7 °C), temperature source Temporal, resp. rate 12, height 5' 9\" (1.753 m), weight 72.8 kg (160 lb 6.4 oz), SpO2 99%., Body mass index is 23.69 kg/m². Oxygen Therapy  SpO2: 99 %  Oxygen Therapy: Supplemental oxygen  O2 Delivery Method: Nasal cannula  O2 Flow Rate (L/min): 6 L/min    Physical Exam   Physical Exam  Vitals reviewed.   Constitutional:       General: He is not in acute distress.     Appearance: He is well-developed.     Eyes:      General: No scleral icterus.    Neck:      Vascular: No JVD.     Cardiovascular:      Rate and Rhythm: Normal rate and regular rhythm.   Pulmonary:      Breath sounds: No wheezing, rhonchi or rales.      Comments: Decreased BS bilateral  Abdominal:      Tenderness: There is no abdominal tenderness.     Musculoskeletal:         General: No swelling.     Skin:     General: Skin is warm and dry.     Neurological:      Mental Status: He is alert and oriented to person, place, and time.     Psychiatric:         Mood and Affect: Mood normal.         Labs: I have personally reviewed pertinent lab results.  Lab Results   Component Value Date    WBC 8.11 03/28/2025    HGB 13.9 06/11/2025    HCT 41 06/11/2025    MCV 97 03/28/2025     03/28/2025     Lab Results   Component Value Date    GLUCOSE 110 06/11/2025    CALCIUM 9.4 03/28/2025    K 4.7 03/28/2025    CO2 36 (H) 06/11/2025    CL 99 03/28/2025    BUN 16 03/28/2025    CREATININE 1.11 03/28/2025     No results found for: \"IGE\"  Lab Results   Component Value Date    ALT 17 03/28/2025    AST 19 03/28/2025    ALKPHOS 72 03/28/2025     Imaging and other studies: I have personally reviewed the following studies    Chest CT - date 4/25/2025  Severe lower lobe predominant emphysema, new nodule in the left upper lobe measuring 5 " mm    Pulmonary function testing:  Performed 4/2/2025  FEV1/FVC ratio 30%    FEV1 19% predicted  FVC 50% predicted  No response to bronchodilators  Post BD FEV1 25%   % predicted   % predicted  DLCO corrected for hemoglobin 32 % predicted    ABG - 6/11/25 - pH 7.417 / pCO2 53 / pO2 53 on RA    6WMT   Date 2/13/2025  Distance walked 76m, 3 L at rest, 6 L with exertion    Pulmonary Rehabilitation - completed / partial Feb 2025 - did not complete due to joint pain    Other Studies:     2D echocardiogram -3/25/2025 EF 58%, no evidence of tricuspid regurgitation.  RVSP not recorded        I spent 80 minutes with the patient and wife counseling and coordinating care:  reviewing all available patient data related to procedure, personally reviewing imaging, studies and pulmonary function testing and discussing the procedure of bronchoscopic lung volume reduction.  I shared an informational presentation and reviewed data from the LIBERATE trial.  They understand that nearly 50% of patients will have a 15% improvement in FEV1.  Patients will generally have improved exercise tolerance and quality of life related to COPD after treatment.  We also discussed the risks, notably a 30% risk of pneumothorax in the postprocedure period and 3% risk of death in LIBERATE trial.  Additional risks include COPD exacerbation, pneumonia and increased supplemental oxygen needs. Patient will require a minimum of a 4 day / 3 night hospitalization to monitor for postprocedure complications. Patient also provided with literature and web site resource regarding [place] valve treatment           [1]   Past Medical History:  Diagnosis Date   • Arthritis    • COPD (chronic obstructive pulmonary disease) (HCC)    • Cough    • Kidney stones    • SOB (shortness of breath)    • Wheezing    [2]   Past Surgical History:  Procedure Laterality Date   • ABDOMINAL AORTIC ANEURYSM REPAIR      ENDOVASC REPAIR AAA PLACE VISCERAL EXTENS PROSTH    LAST  ASSESSED [id number]   • COLONOSCOPY  2011   • HAND SURGERY      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    [3]   Family History  Problem Relation Name Age of Onset   • Heart failure Mother     • Diabetes Mother     • Other Mother          MENTAL DISORDER    [4]   Social History  Tobacco Use   Smoking Status Former   • Types: Cigars   • Start date:    • Quit date:    • Years since quittin.4   • Passive exposure: Past   Smokeless Tobacco Former   • Types: Chew   • Quit date:    [5]   Current Outpatient Medications:   •  albuterol (2.5 mg/3 mL) 0.083 % nebulizer solution, Take 3 mL (2.5 mg total) by nebulization 4 (four) times a day, Disp: 1080 mL, Rfl: 3  •  fluticasone (FLONASE) 50 mcg/act nasal spray, SPRAY 2 SPRAYS INTO EACH NOSTRIL EVERY DAY, Disp: 48 mL, Rfl: 3  •  fluticasone-umeclidinium-vilanterol (Trelegy Ellipta) 200-62.5-25 mcg/actuation AEPB inhaler, Inhale 1 puff daily Rinse mouth after use., Disp: 180 blister, Rfl: 0  •  hydrOXYzine HCL (ATARAX) 25 mg tablet, Take 1 tablet (25 mg total) by mouth every 6 (six) hours as needed for itching, Disp: 30 tablet, Rfl: 2  •  nabumetone (RELAFEN) 500 mg tablet, TAKE 1 TABLET (500 MG TOTAL) BY MOUTH 2 (TWO) TIMES A DAY AS NEEDED (BACK PAIN), Disp: 60 tablet, Rfl: 5  •  omeprazole (PriLOSEC) 20 mg delayed release capsule, Take 1 capsule (20 mg total) by mouth daily, Disp: 90 capsule, Rfl: 5  •  rosuvastatin (CRESTOR) 20 MG tablet, TAKE 1 TABLET BY MOUTH EVERY DAY, Disp: 90 tablet, Rfl: 1  •  Ventolin  (90 Base) MCG/ACT inhaler, INHALE 2 PUFFS EVERY 6 HOURS AS NEEDED FOR WHEEZING, Disp: 18 g, Rfl: 1  [6]   Allergies  Allergen Reactions   • Codeine Nausea Only   • Cortisone      Other reaction(s): Unknown   • Penicillin G      Other reaction(s): Unknown   • Penicillins

## 2025-06-11 NOTE — PROGRESS NOTES
Follow-up  Visit - Pulmonary Medicine   Name: Kei Roth      : 1940      MRN: [id number]  Encounter Provider: Alona Blackwood DO  Encounter Date: 2025   Encounter department: [facility]  :  Assessment & Plan  Centrilobular emphysema (HCC)  Patient has severe COPD/emphysema with hyperinflation and remains symptomatic despite medical therapy with Trelegy Ellipta and albuterol as needed.  He was unable to complete pulmonary habilitation, largely due to joint pains.  I emphasized the importance of being active within the home, particularly as we consider moving forward with bronchoscopic lung volume reduction.  He has completed much of the workup and appears to be a candidate for treatment targeting either right or left lower lobe.  He will need perfusion scan to help determine final target.  With that said, he is at somewhat higher risk of having complications particularly given his oxygen need and borderline hypercapnia.  He understands that risks of pneumothorax, worsening hypoxia, COPD exacerbation and pneumonia are significant considerations.  He further understands that in the Liberate trial, there was a 3% risk of death.  We also discussed that up to 20% of patients will require a revision.  He would like to move forward.  We will coordinate scheduling after final testing is complete.    Given lower lobe predominant disease, patient should have an alpha-1 antitrypsin checked if not already done so.  I do not see any testing in our records.    Orders:    NM lung quantative perfusion w spect ct; Future    Chronic hypoxemic respiratory failure (HCC)  Patient requires 4 L at rest and 6 L with exertion.  Room air PO2 is 54, which is acceptable.    Orders:    POCT 6 minute walk    Blood gas, arterial; Future    NM lung quantative perfusion w spect ct; Future      Referring provider: Dr. Covington    History of Present Illness   HPI:  Kei Roth is a 84 y.o.  "male who is here to discuss bronchoscopic lung volume reduction.  Patient was initially diagnosed with COPD greater than 10 years ago and has been using supplemental oxygen for the past 5 or so years.  He follows with Dr. Covington from pulmonary perspective.  Patient has been troubled with shortness of breath which he feels has become worse over the past 2 years.  He cannot recall an inciting episode that caused a worsening of his symptoms.  He is compliant with once daily and uses albuterol as needed.  He has episodes of cough without significant sputum production.  Denies significant wheezing.  He has not been hospitalized for COPD.  He uses oxygen at 4 L/min continuously.  He does not sleep with BiPAP or CPAP.  He had been tried on nebulized steroids in the past which caused significant side effects.  Patient briefly enrolled in pulmonary rehabilitation earlier this year, but was unable to continue participation due to severe joint pains.  He does not follow with a rheumatologist.  He has no lower extremity edema.  Patient's most notable symptom is shortness of breath with even minimal exertion.  He is able to mow the lawn on a riding mower but admits to not being very active in the home.    Review of Systems otherwise negative    Historical Information   Past Medical History[1]  Past Surgical History[2]  Family History[3]    Occupational History: Retired construction    Tobacco Use History[4]    Meds/Allergies   Current Medications[5]  Allergies[6]    Vitals: Blood pressure 148/72, pulse 71, temperature 98 °F (36.7 °C), temperature source Temporal, resp. rate 12, height 5' 9\" (1.753 m), weight 72.8 kg (160 lb 6.4 oz), SpO2 99%., Body mass index is 23.69 kg/m². Oxygen Therapy  SpO2: 99 %  Oxygen Therapy: Supplemental oxygen  O2 Delivery Method: Nasal cannula  O2 Flow Rate (L/min): 6 L/min    Physical Exam   Physical Exam  Vitals reviewed.   Constitutional:       General: He is not in acute distress.     Appearance: " "He is well-developed.     Eyes:      General: No scleral icterus.    Neck:      Vascular: No JVD.     Cardiovascular:      Rate and Rhythm: Normal rate and regular rhythm.   Pulmonary:      Breath sounds: No wheezing, rhonchi or rales.      Comments: Decreased BS bilateral  Abdominal:      Tenderness: There is no abdominal tenderness.     Musculoskeletal:         General: No swelling.     Skin:     General: Skin is warm and dry.     Neurological:      Mental Status: He is alert and oriented to person, place, and time.     Psychiatric:         Mood and Affect: Mood normal.         Labs: I have personally reviewed pertinent lab results.  Lab Results   Component Value Date    WBC 8.11 03/28/2025    HGB 13.9 06/11/2025    HCT 41 06/11/2025    MCV 97 03/28/2025     03/28/2025     Lab Results   Component Value Date    GLUCOSE 110 06/11/2025    CALCIUM 9.4 03/28/2025    K 4.7 03/28/2025    CO2 36 (H) 06/11/2025    CL 99 03/28/2025    BUN 16 03/28/2025    CREATININE 1.11 03/28/2025     No results found for: \"IGE\"  Lab Results   Component Value Date    ALT 17 03/28/2025    AST 19 03/28/2025    ALKPHOS 72 03/28/2025     Imaging and other studies: I have personally reviewed the following studies    Chest CT - date 4/25/2025  Severe lower lobe predominant emphysema, new nodule in the left upper lobe measuring 5 mm    Pulmonary function testing:  Performed 4/2/2025  FEV1/FVC ratio 30%    FEV1 19% predicted  FVC 50% predicted  No response to bronchodilators  Post BD FEV1 25%   % predicted   % predicted  DLCO corrected for hemoglobin 32 % predicted    ABG - 6/11/25 - pH 7.417 / pCO2 53 / pO2 53 on RA    6WMT   Date 2/13/2025  Distance walked 76m, 3 L at rest, 6 L with exertion    Date 6/11/25  Saturations at rest are 96% on 6 L/min.  He completed testing on 6 L.  He ambulated for 6 minutes for total distance of 216 m.  Saturations reached a low of 94% on 6 L with a maximal heart rate of 104 bpm.    Pulmonary " Rehabilitation - completed / partial Feb 2025 - did not complete due to joint pain    Other Studies:     2D echocardiogram -3/25/2025 EF 58%, no evidence of tricuspid regurgitation.  RVSP not recorded        I spent 80 minutes with the patient and wife counseling and coordinating care:  reviewing all available patient data related to procedure, personally reviewing imaging, studies and pulmonary function testing and discussing the procedure of bronchoscopic lung volume reduction.  I shared an informational presentation and reviewed data from the LIBERATE trial.  They understand that nearly 50% of patients will have a 15% improvement in FEV1.  Patients will generally have improved exercise tolerance and quality of life related to COPD after treatment.  We also discussed the risks, notably a 30% risk of pneumothorax in the postprocedure period and 3% risk of death in LIBERATE trial.  Additional risks include COPD exacerbation, pneumonia and increased supplemental oxygen needs. Patient will require a minimum of a 4 day / 3 night hospitalization to monitor for postprocedure complications. Patient also provided with literature and web site resource regarding [place] valve treatment           [1]   Past Medical History:  Diagnosis Date    Arthritis     COPD (chronic obstructive pulmonary disease) (HCC)     Cough     Kidney stones     SOB (shortness of breath)     Wheezing    [2]   Past Surgical History:  Procedure Laterality Date    ABDOMINAL AORTIC ANEURYSM REPAIR      ENDOVASC REPAIR AAA PLACE VISCERAL EXTENS PROSTH    LAST ASSESSED [id number]    COLONOSCOPY  03/09/2011    HAND SURGERY      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    [3]   Family History  Problem Relation Name Age of Onset    Heart failure Mother      Diabetes Mother      Other Mother          MENTAL DISORDER    [4]   Social History  Tobacco Use   Smoking Status Former    Types: Cigars    Start date: 1969    Quit date: 2014    Years since quitting:  11.4    Passive exposure: Past   Smokeless Tobacco Former    Types: Chew    Quit date: 1954   [5]   Current Outpatient Medications:     albuterol (2.5 mg/3 mL) 0.083 % nebulizer solution, Take 3 mL (2.5 mg total) by nebulization 4 (four) times a day, Disp: 1080 mL, Rfl: 3    fluticasone (FLONASE) 50 mcg/act nasal spray, SPRAY 2 SPRAYS INTO EACH NOSTRIL EVERY DAY, Disp: 48 mL, Rfl: 3    fluticasone-umeclidinium-vilanterol (Trelegy Ellipta) 200-62.5-25 mcg/actuation AEPB inhaler, Inhale 1 puff daily Rinse mouth after use., Disp: 180 blister, Rfl: 0    hydrOXYzine HCL (ATARAX) 25 mg tablet, Take 1 tablet (25 mg total) by mouth every 6 (six) hours as needed for itching, Disp: 30 tablet, Rfl: 2    nabumetone (RELAFEN) 500 mg tablet, TAKE 1 TABLET (500 MG TOTAL) BY MOUTH 2 (TWO) TIMES A DAY AS NEEDED (BACK PAIN), Disp: 60 tablet, Rfl: 5    omeprazole (PriLOSEC) 20 mg delayed release capsule, Take 1 capsule (20 mg total) by mouth daily, Disp: 90 capsule, Rfl: 5    rosuvastatin (CRESTOR) 20 MG tablet, TAKE 1 TABLET BY MOUTH EVERY DAY, Disp: 90 tablet, Rfl: 1    Ventolin  (90 Base) MCG/ACT inhaler, INHALE 2 PUFFS EVERY 6 HOURS AS NEEDED FOR WHEEZING, Disp: 18 g, Rfl: 1  [6]   Allergies  Allergen Reactions    Codeine Nausea Only    Cortisone      Other reaction(s): Unknown    Penicillin G      Other reaction(s): Unknown    Penicillins

## 2025-06-11 NOTE — LETTER
2025     Jannie Covington MD  [address] [facility] [facility]  2nd Floor  [place] [address]    Patient: Kei Roth   YOB: 1940   Date of Visit: 2025       Dear MD Gasper Mares MD:    Thank you for referring Kei Roth to me for evaluation. Below are my notes for this consultation.    If you have questions, please do not hesitate to call me. I look forward to following your patient along with you.         Sincerely,        Alona Blackwood DO        CC: MD Alona Shoemaker DO  2025 10:30 AM  Sign when Signing Visit  Follow-up  Visit - Pulmonary Medicine   Name: Kei Roth      : 1940      MRN: [id number]  Encounter Provider: Alona Blackwood DO  Encounter Date: 2025   Encounter department: [facility]  :  Assessment & Plan  Centrilobular emphysema (HCC)  Patient has severe COPD/emphysema with hyperinflation and remains symptomatic despite medical therapy with Trelegy Ellipta and albuterol as needed.  He was unable to complete pulmonary habilitation, largely due to joint pains.  I emphasized the importance of being active within the home, particularly as we consider moving forward with bronchoscopic lung volume reduction.  He has completed much of the workup and appears to be a candidate for treatment targeting either right or left lower lobe.  He will need perfusion scan to help determine final target.  With that said, he is at somewhat higher risk of having complications particularly given his oxygen need and borderline hypercapnia.  He understands that risks of pneumothorax, worsening hypoxia, COPD exacerbation and pneumonia are significant considerations.  He further understands that in the Liberate trial, there was a 3% risk of death.  We also discussed that up to 20% of patients will require a revision.  He would like to move forward.  We will coordinate scheduling after final  testing is complete.    Given lower lobe predominant disease, patient should have an alpha-1 antitrypsin checked if not already done so.  I do not see any testing in our records.    Orders:  •  NM lung quantative perfusion w spect ct; Future    Chronic hypoxemic respiratory failure (HCC)  Patient requires 4 L at rest and 6 L with exertion.  Room air PO2 is 54, which is acceptable.    Orders:  •  POCT 6 minute walk  •  Blood gas, arterial; Future  •  NM lung quantative perfusion w spect ct; Future      Referring provider: Dr. Covintgon    History of Present Illness  HPI:  Kei Roth is a 84 y.o. male who is here to discuss bronchoscopic lung volume reduction.  Patient was initially diagnosed with COPD greater than 10 years ago and has been using supplemental oxygen for the past 5 or so years.  He follows with Dr. Covington from pulmonary perspective.  Patient has been troubled with shortness of breath which he feels has become worse over the past 2 years.  He cannot recall an inciting episode that caused a worsening of his symptoms.  He is compliant with once daily and uses albuterol as needed.  He has episodes of cough without significant sputum production.  Denies significant wheezing.  He has not been hospitalized for COPD.  He uses oxygen at 4 L/min continuously.  He does not sleep with BiPAP or CPAP.  He had been tried on nebulized steroids in the past which caused significant side effects.  Patient briefly enrolled in pulmonary rehabilitation earlier this year, but was unable to continue participation due to severe joint pains.  He does not follow with a rheumatologist.  He has no lower extremity edema.  Patient's most notable symptom is shortness of breath with even minimal exertion.  He is able to mow the lawn on a riding mower but admits to not being very active in the home.    Review of Systems otherwise negative    Historical Information  Past Medical History[1]  Past Surgical History[2]  Family  "History[3]    Occupational History: Retired construction    Tobacco Use History[4]    Meds/Allergies  Current Medications[5]  Allergies[6]    Vitals: Blood pressure 148/72, pulse 71, temperature 98 °F (36.7 °C), temperature source Temporal, resp. rate 12, height 5' 9\" (1.753 m), weight 72.8 kg (160 lb 6.4 oz), SpO2 99%., Body mass index is 23.69 kg/m². Oxygen Therapy  SpO2: 99 %  Oxygen Therapy: Supplemental oxygen  O2 Delivery Method: Nasal cannula  O2 Flow Rate (L/min): 6 L/min    Physical Exam   Physical Exam  Vitals reviewed.   Constitutional:       General: He is not in acute distress.     Appearance: He is well-developed.     Eyes:      General: No scleral icterus.    Neck:      Vascular: No JVD.     Cardiovascular:      Rate and Rhythm: Normal rate and regular rhythm.   Pulmonary:      Breath sounds: No wheezing, rhonchi or rales.      Comments: Decreased BS bilateral  Abdominal:      Tenderness: There is no abdominal tenderness.     Musculoskeletal:         General: No swelling.     Skin:     General: Skin is warm and dry.     Neurological:      Mental Status: He is alert and oriented to person, place, and time.     Psychiatric:         Mood and Affect: Mood normal.         Labs: I have personally reviewed pertinent lab results.  Lab Results   Component Value Date    WBC 8.11 03/28/2025    HGB 13.9 06/11/2025    HCT 41 06/11/2025    MCV 97 03/28/2025     03/28/2025     Lab Results   Component Value Date    GLUCOSE 110 06/11/2025    CALCIUM 9.4 03/28/2025    K 4.7 03/28/2025    CO2 36 (H) 06/11/2025    CL 99 03/28/2025    BUN 16 03/28/2025    CREATININE 1.11 03/28/2025     No results found for: \"IGE\"  Lab Results   Component Value Date    ALT 17 03/28/2025    AST 19 03/28/2025    ALKPHOS 72 03/28/2025     Imaging and other studies: I have personally reviewed the following studies    Chest CT - date 4/25/2025  Severe lower lobe predominant emphysema, new nodule in the left upper lobe measuring 5 " mm    Pulmonary function testing:  Performed 4/2/2025  FEV1/FVC ratio 30%    FEV1 19% predicted  FVC 50% predicted  No response to bronchodilators  Post BD FEV1 25%   % predicted   % predicted  DLCO corrected for hemoglobin 32 % predicted    ABG - 6/11/25 - pH 7.417 / pCO2 53 / pO2 53 on RA    6WMT   Date 2/13/2025  Distance walked 76m, 3 L at rest, 6 L with exertion    Date 6/11/25  Saturations at rest are 96% on 6 L/min.  He completed testing on 6 L.  He ambulated for 6 minutes for total distance of 216 m.  Saturations reached a low of 94% on 6 L with a maximal heart rate of 104 bpm.    Pulmonary Rehabilitation - completed / partial Feb 2025 - did not complete due to joint pain    Other Studies:     2D echocardiogram -3/25/2025 EF 58%, no evidence of tricuspid regurgitation.  RVSP not recorded        I spent 80 minutes with the patient and wife counseling and coordinating care:  reviewing all available patient data related to procedure, personally reviewing imaging, studies and pulmonary function testing and discussing the procedure of bronchoscopic lung volume reduction.  I shared an informational presentation and reviewed data from the LIBERATE trial.  They understand that nearly 50% of patients will have a 15% improvement in FEV1.  Patients will generally have improved exercise tolerance and quality of life related to COPD after treatment.  We also discussed the risks, notably a 30% risk of pneumothorax in the postprocedure period and 3% risk of death in LIBERATE trial.  Additional risks include COPD exacerbation, pneumonia and increased supplemental oxygen needs. Patient will require a minimum of a 4 day / 3 night hospitalization to monitor for postprocedure complications. Patient also provided with literature and web site resource regarding [place] valve treatment           [1]   Past Medical History:  Diagnosis Date   • Arthritis    • COPD (chronic obstructive pulmonary disease) (HCC)    • Cough     • Kidney stones    • SOB (shortness of breath)    • Wheezing    [2]   Past Surgical History:  Procedure Laterality Date   • ABDOMINAL AORTIC ANEURYSM REPAIR      ENDOVASC REPAIR AAA PLACE VISCERAL EXTENS PROSTH    LAST ASSESSED [id number]   • COLONOSCOPY  2011   • HAND SURGERY      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    [3]   Family History  Problem Relation Name Age of Onset   • Heart failure Mother     • Diabetes Mother     • Other Mother          MENTAL DISORDER    [4]   Social History  Tobacco Use   Smoking Status Former   • Types: Cigars   • Start date:    • Quit date:    • Years since quittin.4   • Passive exposure: Past   Smokeless Tobacco Former   • Types: Chew   • Quit date:    [5]   Current Outpatient Medications:   •  albuterol (2.5 mg/3 mL) 0.083 % nebulizer solution, Take 3 mL (2.5 mg total) by nebulization 4 (four) times a day, Disp: 1080 mL, Rfl: 3  •  fluticasone (FLONASE) 50 mcg/act nasal spray, SPRAY 2 SPRAYS INTO EACH NOSTRIL EVERY DAY, Disp: 48 mL, Rfl: 3  •  fluticasone-umeclidinium-vilanterol (Trelegy Ellipta) 200-62.5-25 mcg/actuation AEPB inhaler, Inhale 1 puff daily Rinse mouth after use., Disp: 180 blister, Rfl: 0  •  hydrOXYzine HCL (ATARAX) 25 mg tablet, Take 1 tablet (25 mg total) by mouth every 6 (six) hours as needed for itching, Disp: 30 tablet, Rfl: 2  •  nabumetone (RELAFEN) 500 mg tablet, TAKE 1 TABLET (500 MG TOTAL) BY MOUTH 2 (TWO) TIMES A DAY AS NEEDED (BACK PAIN), Disp: 60 tablet, Rfl: 5  •  omeprazole (PriLOSEC) 20 mg delayed release capsule, Take 1 capsule (20 mg total) by mouth daily, Disp: 90 capsule, Rfl: 5  •  rosuvastatin (CRESTOR) 20 MG tablet, TAKE 1 TABLET BY MOUTH EVERY DAY, Disp: 90 tablet, Rfl: 1  •  Ventolin  (90 Base) MCG/ACT inhaler, INHALE 2 PUFFS EVERY 6 HOURS AS NEEDED FOR WHEEZING, Disp: 18 g, Rfl: 1  [6]   Allergies  Allergen Reactions   • Codeine Nausea Only   • Cortisone      Other reaction(s): Unknown   • Penicillin  G      Other reaction(s): Unknown   • Penicillins

## 2025-06-11 NOTE — ASSESSMENT & PLAN NOTE
Patient has severe COPD/emphysema with hyperinflation and remains symptomatic despite medical therapy with Trelegy Ellipta and albuterol as needed.  He was unable to complete pulmonary habilitation, largely due to joint pains.  I emphasized the importance of being active within the home, particularly as we consider moving forward with bronchoscopic lung volume reduction.  He has completed much of the workup and appears to be a candidate for treatment targeting either right or left lower lobe.  He will need perfusion scan to help determine final target.  With that said, he is at somewhat higher risk of having complications particularly given his oxygen need and borderline hypercapnia.  He understands that risks of pneumothorax, worsening hypoxia, COPD exacerbation and pneumonia are significant considerations.  He further understands that in the Liberate trial, there was a 3% risk of death.  We also discussed that up to 20% of patients will require a revision.  He would like to move forward.  We will coordinate scheduling after final testing is complete.    Given lower lobe predominant disease, patient should have an alpha-1 antitrypsin checked if not already done so.  I do not see any testing in our records.    Orders:    NM lung quantative perfusion w spect ct; Future

## 2025-06-19 ENCOUNTER — HOSPITAL ENCOUNTER (OUTPATIENT)
Dept: RADIOLOGY | Facility: HOSPITAL | Age: 85
Discharge: HOME | End: 2025-06-19
Attending: INTERNAL MEDICINE
Payer: COMMERCIAL

## 2025-06-19 DIAGNOSIS — J96.11 CHRONIC HYPOXEMIC RESPIRATORY FAILURE (HCC): ICD-10-CM

## 2025-06-19 DIAGNOSIS — J43.2 CENTRILOBULAR EMPHYSEMA (HCC): ICD-10-CM

## 2025-06-19 PROCEDURE — A9540 TC99M MAA: HCPCS

## 2025-06-19 PROCEDURE — 78830 RP LOCLZJ TUM SPECT W/CT 1: CPT

## 2025-06-19 PROCEDURE — 78597 LUNG PERFUSION DIFFERENTIAL: CPT

## 2025-06-20 ENCOUNTER — RESULTS FOLLOW-UP (OUTPATIENT)
Dept: PULMONOLOGY | Facility: CLINIC | Age: 85
End: 2025-06-20

## 2025-06-25 ENCOUNTER — PREP FOR PROCEDURE (OUTPATIENT)
Dept: PULMONOLOGY | Facility: CLINIC | Age: 85
End: 2025-06-25

## 2025-06-25 DIAGNOSIS — J43.2 CENTRILOBULAR EMPHYSEMA (HCC): Primary | ICD-10-CM

## 2025-06-25 RX ORDER — SODIUM CHLORIDE, SODIUM LACTATE, POTASSIUM CHLORIDE, CALCIUM CHLORIDE 600; 310; 30; 20 MG/100ML; MG/100ML; MG/100ML; MG/100ML
20 INJECTION, SOLUTION INTRAVENOUS CONTINUOUS
OUTPATIENT
Start: 2025-06-25

## 2025-06-27 ENCOUNTER — TELEPHONE (OUTPATIENT)
Dept: PULMONOLOGY | Facility: CLINIC | Age: 85
End: 2025-06-27

## 2025-06-27 NOTE — TELEPHONE ENCOUNTER
Dr. FELIPE will be performing a ZEPHYR VALVE PROCEDURE on Kei Roth on 07/28/2025     LOCATION: [facility]    ANTICOAGULATION INSTRUCTIONS: NONE    OTHER MEDICATION INSTRUCTIONS: NONE    LABS: (CBC/PT/PTT in last 90 days): 04/01/2025   (If no, labs ordered / to be done at least one day prior to procedure)    LAST OFFICE NOTE/H&P within 30 days of procedure: 06/11/2025    INSTRUCTIONS GIVEN: Spoke with patient advised of procedure date and location. Patient aware he is to be NPO after midnight the night prior. Patient aware he will be inpatient for 3-4 days after procedure. Patient aware he will receive a call the day before with time of arrival.    Thank You   Skye Posadas

## 2025-07-28 ENCOUNTER — APPOINTMENT (INPATIENT)
Dept: RADIOLOGY | Facility: HOSPITAL | Age: 85
DRG: 164 | End: 2025-07-28
Payer: COMMERCIAL

## 2025-07-28 ENCOUNTER — APPOINTMENT (OUTPATIENT)
Dept: RADIOLOGY | Facility: HOSPITAL | Age: 85
DRG: 164 | End: 2025-07-28
Payer: COMMERCIAL

## 2025-07-28 ENCOUNTER — HOSPITAL ENCOUNTER (INPATIENT)
Dept: PERIOP | Facility: HOSPITAL | Age: 85
LOS: 3 days | Discharge: HOME | DRG: 164 | End: 2025-07-31
Attending: INTERNAL MEDICINE | Admitting: INTERNAL MEDICINE
Payer: COMMERCIAL

## 2025-07-28 ENCOUNTER — ANESTHESIA (OUTPATIENT)
Dept: PERIOP | Facility: HOSPITAL | Age: 85
DRG: 164 | End: 2025-07-28
Payer: COMMERCIAL

## 2025-07-28 ENCOUNTER — ANESTHESIA EVENT (OUTPATIENT)
Dept: PERIOP | Facility: HOSPITAL | Age: 85
DRG: 164 | End: 2025-07-28
Payer: COMMERCIAL

## 2025-07-28 DIAGNOSIS — J43.2 CENTRILOBULAR EMPHYSEMA (HCC): Primary | ICD-10-CM

## 2025-07-28 LAB
ALBUMIN SERPL BCG-MCNC: 4 G/DL (ref 3.5–5)
ALP SERPL-CCNC: 71 U/L (ref 34–104)
ALT SERPL W P-5'-P-CCNC: 17 U/L (ref 7–52)
ANION GAP SERPL CALCULATED.3IONS-SCNC: 6 MMOL/L (ref 4–13)
AST SERPL W P-5'-P-CCNC: 16 U/L (ref 13–39)
BASOPHILS # BLD AUTO: 0.03 THOUSANDS/ÂΜL (ref 0–0.1)
BASOPHILS NFR BLD AUTO: 0 % (ref 0–1)
BILIRUB SERPL-MCNC: 0.44 MG/DL (ref 0.2–1)
BUN SERPL-MCNC: 19 MG/DL (ref 5–25)
CALCIUM SERPL-MCNC: 8.9 MG/DL (ref 8.4–10.2)
CHLORIDE SERPL-SCNC: 103 MMOL/L (ref 96–108)
CO2 SERPL-SCNC: 31 MMOL/L (ref 21–32)
CREAT SERPL-MCNC: 1.03 MG/DL (ref 0.6–1.3)
EOSINOPHIL # BLD AUTO: 0.32 THOUSAND/ÂΜL (ref 0–0.61)
EOSINOPHIL NFR BLD AUTO: 3 % (ref 0–6)
ERYTHROCYTE [DISTWIDTH] IN BLOOD BY AUTOMATED COUNT: 12.9 % (ref 11.6–15.1)
GFR SERPL CREATININE-BSD FRML MDRD: 67 ML/MIN/1.73SQ M
GLUCOSE SERPL-MCNC: 106 MG/DL (ref 65–140)
HCT VFR BLD AUTO: 42.5 % (ref 36.5–49.3)
HGB BLD-MCNC: 13.8 G/DL (ref 12–17)
IMM GRANULOCYTES # BLD AUTO: 0.04 THOUSAND/UL (ref 0–0.2)
IMM GRANULOCYTES NFR BLD AUTO: 0 % (ref 0–2)
INR PPP: 1.05 (ref 0.85–1.19)
LYMPHOCYTES # BLD AUTO: 1.38 THOUSANDS/ÂΜL (ref 0.6–4.47)
LYMPHOCYTES NFR BLD AUTO: 13 % (ref 14–44)
MAGNESIUM SERPL-MCNC: 1.6 MG/DL (ref 1.9–2.7)
MCH RBC QN AUTO: 30.9 PG (ref 26.8–34.3)
MCHC RBC AUTO-ENTMCNC: 32.5 G/DL (ref 31.4–37.4)
MCV RBC AUTO: 95 FL (ref 82–98)
MONOCYTES # BLD AUTO: 0.88 THOUSAND/ÂΜL (ref 0.17–1.22)
MONOCYTES NFR BLD AUTO: 8 % (ref 4–12)
NEUTROPHILS # BLD AUTO: 8.42 THOUSANDS/ÂΜL (ref 1.85–7.62)
NEUTS SEG NFR BLD AUTO: 76 % (ref 43–75)
NRBC BLD AUTO-RTO: 0 /100 WBCS
PHOSPHATE SERPL-MCNC: 3.6 MG/DL (ref 2.3–4.1)
PLATELET # BLD AUTO: 151 THOUSANDS/UL (ref 149–390)
PMV BLD AUTO: 11.2 FL (ref 8.9–12.7)
POTASSIUM SERPL-SCNC: 4.5 MMOL/L (ref 3.5–5.3)
PROT SERPL-MCNC: 6.8 G/DL (ref 6.4–8.4)
PROTHROMBIN TIME: 14 SECONDS (ref 12.3–15)
RBC # BLD AUTO: 4.47 MILLION/UL (ref 3.88–5.62)
SODIUM SERPL-SCNC: 140 MMOL/L (ref 135–147)
WBC # BLD AUTO: 11.07 THOUSAND/UL (ref 4.31–10.16)

## 2025-07-28 PROCEDURE — 71045 X-RAY EXAM CHEST 1 VIEW: CPT

## 2025-07-28 PROCEDURE — 85610 PROTHROMBIN TIME: CPT | Performed by: STUDENT IN AN ORGANIZED HEALTH CARE EDUCATION/TRAINING PROGRAM

## 2025-07-28 PROCEDURE — 94640 AIRWAY INHALATION TREATMENT: CPT

## 2025-07-28 PROCEDURE — 85025 COMPLETE CBC W/AUTO DIFF WBC: CPT | Performed by: STUDENT IN AN ORGANIZED HEALTH CARE EDUCATION/TRAINING PROGRAM

## 2025-07-28 PROCEDURE — 99291 CRITICAL CARE FIRST HOUR: CPT | Performed by: STUDENT IN AN ORGANIZED HEALTH CARE EDUCATION/TRAINING PROGRAM

## 2025-07-28 PROCEDURE — 94760 N-INVAS EAR/PLS OXIMETRY 1: CPT

## 2025-07-28 PROCEDURE — 83735 ASSAY OF MAGNESIUM: CPT | Performed by: STUDENT IN AN ORGANIZED HEALTH CARE EDUCATION/TRAINING PROGRAM

## 2025-07-28 PROCEDURE — 31647 BRONCHIAL VALVE INIT INSERT: CPT | Performed by: INTERNAL MEDICINE

## 2025-07-28 PROCEDURE — 80053 COMPREHEN METABOLIC PANEL: CPT | Performed by: STUDENT IN AN ORGANIZED HEALTH CARE EDUCATION/TRAINING PROGRAM

## 2025-07-28 PROCEDURE — 84100 ASSAY OF PHOSPHORUS: CPT | Performed by: STUDENT IN AN ORGANIZED HEALTH CARE EDUCATION/TRAINING PROGRAM

## 2025-07-28 PROCEDURE — 0BHB8GZ INSERTION OF ENDOBRONCHIAL VALVE INTO LEFT LOWER LOBE BRONCHUS, VIA NATURAL OR ARTIFICIAL OPENING ENDOSCOPIC: ICD-10-PCS | Performed by: INTERNAL MEDICINE

## 2025-07-28 PROCEDURE — C1889 IMPLANT/INSERT DEVICE, NOC: HCPCS | Performed by: STUDENT IN AN ORGANIZED HEALTH CARE EDUCATION/TRAINING PROGRAM

## 2025-07-28 PROCEDURE — NC001 PR NO CHARGE: Performed by: INTERNAL MEDICINE

## 2025-07-28 RX ORDER — LEVALBUTEROL INHALATION SOLUTION 1.25 MG/3ML
1.25 SOLUTION RESPIRATORY (INHALATION) EVERY 6 HOURS PRN
Status: DISCONTINUED | OUTPATIENT
Start: 2025-07-28 | End: 2025-07-28

## 2025-07-28 RX ORDER — ALBUTEROL SULFATE 0.83 MG/ML
2.5 SOLUTION RESPIRATORY (INHALATION) ONCE
Status: COMPLETED | OUTPATIENT
Start: 2025-07-28 | End: 2025-07-28

## 2025-07-28 RX ORDER — POLYETHYLENE GLYCOL 3350 17 G/17G
17 POWDER, FOR SOLUTION ORAL DAILY PRN
Status: DISCONTINUED | OUTPATIENT
Start: 2025-07-28 | End: 2025-07-29

## 2025-07-28 RX ORDER — SODIUM CHLORIDE, SODIUM LACTATE, POTASSIUM CHLORIDE, CALCIUM CHLORIDE 600; 310; 30; 20 MG/100ML; MG/100ML; MG/100ML; MG/100ML
INJECTION, SOLUTION INTRAVENOUS CONTINUOUS PRN
Status: DISCONTINUED | OUTPATIENT
Start: 2025-07-28 | End: 2025-07-28

## 2025-07-28 RX ORDER — MAGNESIUM SULFATE HEPTAHYDRATE 40 MG/ML
2 INJECTION, SOLUTION INTRAVENOUS ONCE
Status: COMPLETED | OUTPATIENT
Start: 2025-07-28 | End: 2025-07-28

## 2025-07-28 RX ORDER — LIDOCAINE HYDROCHLORIDE 10 MG/ML
INJECTION, SOLUTION EPIDURAL; INFILTRATION; INTRACAUDAL; PERINEURAL AS NEEDED
Status: DISCONTINUED | OUTPATIENT
Start: 2025-07-28 | End: 2025-07-28

## 2025-07-28 RX ORDER — BENZONATATE 100 MG/1
100 CAPSULE ORAL 3 TIMES DAILY
Status: DISCONTINUED | OUTPATIENT
Start: 2025-07-28 | End: 2025-07-31 | Stop reason: HOSPADM

## 2025-07-28 RX ORDER — FENTANYL CITRATE 50 UG/ML
INJECTION, SOLUTION INTRAMUSCULAR; INTRAVENOUS AS NEEDED
Status: DISCONTINUED | OUTPATIENT
Start: 2025-07-28 | End: 2025-07-28

## 2025-07-28 RX ORDER — ENOXAPARIN SODIUM 100 MG/ML
40 INJECTION SUBCUTANEOUS DAILY
Status: DISCONTINUED | OUTPATIENT
Start: 2025-07-28 | End: 2025-07-31 | Stop reason: HOSPADM

## 2025-07-28 RX ORDER — BUDESONIDE 0.5 MG/2ML
0.5 INHALANT ORAL
Status: DISCONTINUED | OUTPATIENT
Start: 2025-07-28 | End: 2025-07-31 | Stop reason: HOSPADM

## 2025-07-28 RX ORDER — PHENYLEPHRINE HCL IN 0.9% NACL 1 MG/10 ML
SYRINGE (ML) INTRAVENOUS AS NEEDED
Status: DISCONTINUED | OUTPATIENT
Start: 2025-07-28 | End: 2025-07-28

## 2025-07-28 RX ORDER — SENNOSIDES 8.6 MG
1 TABLET ORAL
Status: DISCONTINUED | OUTPATIENT
Start: 2025-07-28 | End: 2025-07-31 | Stop reason: HOSPADM

## 2025-07-28 RX ORDER — ENOXAPARIN SODIUM 100 MG/ML
INJECTION SUBCUTANEOUS
Status: COMPLETED
Start: 2025-07-28 | End: 2025-07-28

## 2025-07-28 RX ORDER — ESMOLOL HYDROCHLORIDE 10 MG/ML
INJECTION INTRAVENOUS AS NEEDED
Status: DISCONTINUED | OUTPATIENT
Start: 2025-07-28 | End: 2025-07-28

## 2025-07-28 RX ORDER — CHLORHEXIDINE GLUCONATE ORAL RINSE 1.2 MG/ML
15 SOLUTION DENTAL EVERY 12 HOURS SCHEDULED
Status: DISCONTINUED | OUTPATIENT
Start: 2025-07-28 | End: 2025-07-31 | Stop reason: HOSPADM

## 2025-07-28 RX ORDER — HYDROCODONE POLISTIREX AND CHLORPHENIRAMINE POLISTIREX 10; 8 MG/5ML; MG/5ML
5 SUSPENSION, EXTENDED RELEASE ORAL EVERY 12 HOURS PRN
Status: DISCONTINUED | OUTPATIENT
Start: 2025-07-28 | End: 2025-07-31 | Stop reason: HOSPADM

## 2025-07-28 RX ORDER — SODIUM CHLORIDE, SODIUM LACTATE, POTASSIUM CHLORIDE, CALCIUM CHLORIDE 600; 310; 30; 20 MG/100ML; MG/100ML; MG/100ML; MG/100ML
20 INJECTION, SOLUTION INTRAVENOUS CONTINUOUS
Status: DISCONTINUED | OUTPATIENT
Start: 2025-07-28 | End: 2025-07-28

## 2025-07-28 RX ORDER — ONDANSETRON 2 MG/ML
INJECTION INTRAMUSCULAR; INTRAVENOUS AS NEEDED
Status: DISCONTINUED | OUTPATIENT
Start: 2025-07-28 | End: 2025-07-28

## 2025-07-28 RX ORDER — ACETAMINOPHEN 325 MG/1
650 TABLET ORAL EVERY 6 HOURS PRN
Status: DISCONTINUED | OUTPATIENT
Start: 2025-07-28 | End: 2025-07-31 | Stop reason: HOSPADM

## 2025-07-28 RX ORDER — PROPOFOL 10 MG/ML
INJECTION, EMULSION INTRAVENOUS CONTINUOUS PRN
Status: DISCONTINUED | OUTPATIENT
Start: 2025-07-28 | End: 2025-07-28

## 2025-07-28 RX ORDER — LEVALBUTEROL INHALATION SOLUTION 1.25 MG/3ML
1.25 SOLUTION RESPIRATORY (INHALATION)
Status: DISCONTINUED | OUTPATIENT
Start: 2025-07-28 | End: 2025-07-31 | Stop reason: HOSPADM

## 2025-07-28 RX ORDER — FENTANYL CITRATE 50 UG/ML
50 INJECTION, SOLUTION INTRAMUSCULAR; INTRAVENOUS ONCE
Refills: 0 | Status: DISCONTINUED | OUTPATIENT
Start: 2025-07-28 | End: 2025-07-28

## 2025-07-28 RX ORDER — ALBUTEROL SULFATE 0.83 MG/ML
2.5 SOLUTION RESPIRATORY (INHALATION) ONCE
OUTPATIENT
Start: 2025-07-28 | End: 2025-07-28

## 2025-07-28 RX ORDER — FORMOTEROL FUMARATE 20 UG/2ML
20 SOLUTION RESPIRATORY (INHALATION)
Status: DISCONTINUED | OUTPATIENT
Start: 2025-07-28 | End: 2025-07-31 | Stop reason: HOSPADM

## 2025-07-28 RX ORDER — LIDOCAINE HYDROCHLORIDE 20 MG/ML
INJECTION, SOLUTION EPIDURAL; INFILTRATION; INTRACAUDAL; PERINEURAL AS NEEDED
Status: COMPLETED | OUTPATIENT
Start: 2025-07-28 | End: 2025-07-28

## 2025-07-28 RX ORDER — ROCURONIUM BROMIDE 10 MG/ML
INJECTION, SOLUTION INTRAVENOUS AS NEEDED
Status: DISCONTINUED | OUTPATIENT
Start: 2025-07-28 | End: 2025-07-28

## 2025-07-28 RX ORDER — OXYCODONE HYDROCHLORIDE 5 MG/1
5 TABLET ORAL EVERY 4 HOURS PRN
Refills: 0 | Status: DISCONTINUED | OUTPATIENT
Start: 2025-07-28 | End: 2025-07-31 | Stop reason: HOSPADM

## 2025-07-28 RX ADMIN — ROCURONIUM BROMIDE 10 MG: 10 INJECTION, SOLUTION INTRAVENOUS at 15:07

## 2025-07-28 RX ADMIN — BENZONATATE 100 MG: 100 CAPSULE ORAL at 16:51

## 2025-07-28 RX ADMIN — ROCURONIUM BROMIDE 50 MG: 10 INJECTION, SOLUTION INTRAVENOUS at 14:25

## 2025-07-28 RX ADMIN — FENTANYL CITRATE 50 MCG: 50 INJECTION INTRAMUSCULAR; INTRAVENOUS at 14:25

## 2025-07-28 RX ADMIN — FENTANYL CITRATE 25 MCG: 50 INJECTION INTRAMUSCULAR; INTRAVENOUS at 14:35

## 2025-07-28 RX ADMIN — PHENYLEPHRINE HYDROCHLORIDE 20 MCG/MIN: 50 INJECTION INTRAVENOUS at 14:37

## 2025-07-28 RX ADMIN — Medication 50 MCG: at 14:38

## 2025-07-28 RX ADMIN — ENOXAPARIN SODIUM 40 MG: 40 INJECTION SUBCUTANEOUS at 16:52

## 2025-07-28 RX ADMIN — Medication 100 MCG: at 14:43

## 2025-07-28 RX ADMIN — ALBUTEROL SULFATE 2.5 MG: 2.5 SOLUTION RESPIRATORY (INHALATION) at 13:45

## 2025-07-28 RX ADMIN — Medication 50 MCG: at 14:37

## 2025-07-28 RX ADMIN — REMIFENTANIL HYDROCHLORIDE 0.15 MCG/KG/MIN: 1 INJECTION, POWDER, LYOPHILIZED, FOR SOLUTION INTRAVENOUS at 14:27

## 2025-07-28 RX ADMIN — BENZONATATE 100 MG: 100 CAPSULE ORAL at 22:05

## 2025-07-28 RX ADMIN — MAGNESIUM SULFATE HEPTAHYDRATE 2 G: 40 INJECTION, SOLUTION INTRAVENOUS at 18:23

## 2025-07-28 RX ADMIN — PROPOFOL 110 MCG/KG/MIN: 10 INJECTION, EMULSION INTRAVENOUS at 14:27

## 2025-07-28 RX ADMIN — ESMOLOL HYDROCHLORIDE 10 MG: 100 INJECTION, SOLUTION INTRAVENOUS at 14:32

## 2025-07-28 RX ADMIN — SODIUM CHLORIDE, SODIUM LACTATE, POTASSIUM CHLORIDE, AND CALCIUM CHLORIDE: .6; .31; .03; .02 INJECTION, SOLUTION INTRAVENOUS at 14:17

## 2025-07-28 RX ADMIN — LIDOCAINE HYDROCHLORIDE 50 MG: 10 INJECTION, SOLUTION EPIDURAL; INFILTRATION; INTRACAUDAL; PERINEURAL at 14:25

## 2025-07-28 RX ADMIN — ROCURONIUM BROMIDE 10 MG: 10 INJECTION, SOLUTION INTRAVENOUS at 15:01

## 2025-07-28 RX ADMIN — CHLORHEXIDINE GLUCONATE 15 ML: 1.2 SOLUTION ORAL at 22:05

## 2025-07-28 RX ADMIN — LIDOCAINE HYDROCHLORIDE 10 ML: 20 INJECTION, SOLUTION EPIDURAL; INFILTRATION; INTRACAUDAL; PERINEURAL at 15:18

## 2025-07-28 RX ADMIN — BUDESONIDE 0.5 MG: 0.5 INHALANT RESPIRATORY (INHALATION) at 19:01

## 2025-07-28 RX ADMIN — IPRATROPIUM BROMIDE 0.5 MG: 0.5 SOLUTION RESPIRATORY (INHALATION) at 19:01

## 2025-07-28 RX ADMIN — ONDANSETRON 4 MG: 2 INJECTION INTRAMUSCULAR; INTRAVENOUS at 14:41

## 2025-07-28 RX ADMIN — LEVALBUTEROL HYDROCHLORIDE 1.25 MG: 1.25 SOLUTION RESPIRATORY (INHALATION) at 19:01

## 2025-07-29 ENCOUNTER — APPOINTMENT (INPATIENT)
Dept: RADIOLOGY | Facility: HOSPITAL | Age: 85
DRG: 164 | End: 2025-07-29
Payer: COMMERCIAL

## 2025-07-29 LAB
ALBUMIN SERPL BCG-MCNC: 3.4 G/DL (ref 3.5–5)
ALP SERPL-CCNC: 63 U/L (ref 34–104)
ALT SERPL W P-5'-P-CCNC: 12 U/L (ref 7–52)
ANION GAP SERPL CALCULATED.3IONS-SCNC: 4 MMOL/L (ref 4–13)
AST SERPL W P-5'-P-CCNC: 14 U/L (ref 13–39)
BASOPHILS # BLD AUTO: 0.03 THOUSANDS/ÂΜL (ref 0–0.1)
BASOPHILS NFR BLD AUTO: 0 % (ref 0–1)
BILIRUB SERPL-MCNC: 0.45 MG/DL (ref 0.2–1)
BUN SERPL-MCNC: 19 MG/DL (ref 5–25)
CA-I BLD-SCNC: 1.11 MMOL/L (ref 1.12–1.32)
CALCIUM ALBUM COR SERPL-MCNC: 9 MG/DL (ref 8.3–10.1)
CALCIUM SERPL-MCNC: 8.5 MG/DL (ref 8.4–10.2)
CHLORIDE SERPL-SCNC: 102 MMOL/L (ref 96–108)
CO2 SERPL-SCNC: 33 MMOL/L (ref 21–32)
CREAT SERPL-MCNC: 1.13 MG/DL (ref 0.6–1.3)
EOSINOPHIL # BLD AUTO: 0.21 THOUSAND/ÂΜL (ref 0–0.61)
EOSINOPHIL NFR BLD AUTO: 2 % (ref 0–6)
ERYTHROCYTE [DISTWIDTH] IN BLOOD BY AUTOMATED COUNT: 13.1 % (ref 11.6–15.1)
GFR SERPL CREATININE-BSD FRML MDRD: 60 ML/MIN/1.73SQ M
GLUCOSE SERPL-MCNC: 91 MG/DL (ref 65–140)
HCT VFR BLD AUTO: 37.2 % (ref 36.5–49.3)
HGB BLD-MCNC: 12 G/DL (ref 12–17)
IMM GRANULOCYTES # BLD AUTO: 0.03 THOUSAND/UL (ref 0–0.2)
IMM GRANULOCYTES NFR BLD AUTO: 0 % (ref 0–2)
LYMPHOCYTES # BLD AUTO: 0.84 THOUSANDS/ÂΜL (ref 0.6–4.47)
LYMPHOCYTES NFR BLD AUTO: 9 % (ref 14–44)
MAGNESIUM SERPL-MCNC: 2.2 MG/DL (ref 1.9–2.7)
MCH RBC QN AUTO: 30.4 PG (ref 26.8–34.3)
MCHC RBC AUTO-ENTMCNC: 32.3 G/DL (ref 31.4–37.4)
MCV RBC AUTO: 94 FL (ref 82–98)
MONOCYTES # BLD AUTO: 0.89 THOUSAND/ÂΜL (ref 0.17–1.22)
MONOCYTES NFR BLD AUTO: 10 % (ref 4–12)
NEUTROPHILS # BLD AUTO: 7.19 THOUSANDS/ÂΜL (ref 1.85–7.62)
NEUTS SEG NFR BLD AUTO: 79 % (ref 43–75)
NRBC BLD AUTO-RTO: 0 /100 WBCS
PHOSPHATE SERPL-MCNC: 3.4 MG/DL (ref 2.3–4.1)
PLATELET # BLD AUTO: 144 THOUSANDS/UL (ref 149–390)
PMV BLD AUTO: 11.7 FL (ref 8.9–12.7)
POTASSIUM SERPL-SCNC: 4.5 MMOL/L (ref 3.5–5.3)
PROT SERPL-MCNC: 6 G/DL (ref 6.4–8.4)
RBC # BLD AUTO: 3.95 MILLION/UL (ref 3.88–5.62)
SODIUM SERPL-SCNC: 139 MMOL/L (ref 135–147)
WBC # BLD AUTO: 9.19 THOUSAND/UL (ref 4.31–10.16)

## 2025-07-29 PROCEDURE — 71045 X-RAY EXAM CHEST 1 VIEW: CPT

## 2025-07-29 PROCEDURE — 94640 AIRWAY INHALATION TREATMENT: CPT

## 2025-07-29 PROCEDURE — 85025 COMPLETE CBC W/AUTO DIFF WBC: CPT

## 2025-07-29 PROCEDURE — 82330 ASSAY OF CALCIUM: CPT

## 2025-07-29 PROCEDURE — 84100 ASSAY OF PHOSPHORUS: CPT

## 2025-07-29 PROCEDURE — 94760 N-INVAS EAR/PLS OXIMETRY 1: CPT

## 2025-07-29 PROCEDURE — 97162 PT EVAL MOD COMPLEX 30 MIN: CPT

## 2025-07-29 PROCEDURE — 99291 CRITICAL CARE FIRST HOUR: CPT | Performed by: STUDENT IN AN ORGANIZED HEALTH CARE EDUCATION/TRAINING PROGRAM

## 2025-07-29 PROCEDURE — 80053 COMPREHEN METABOLIC PANEL: CPT

## 2025-07-29 PROCEDURE — 83735 ASSAY OF MAGNESIUM: CPT

## 2025-07-29 PROCEDURE — 94664 DEMO&/EVAL PT USE INHALER: CPT

## 2025-07-29 RX ORDER — POLYETHYLENE GLYCOL 3350 17 G/17G
17 POWDER, FOR SOLUTION ORAL DAILY PRN
Status: DISCONTINUED | OUTPATIENT
Start: 2025-07-29 | End: 2025-07-31 | Stop reason: HOSPADM

## 2025-07-29 RX ORDER — ATORVASTATIN CALCIUM 40 MG/1
40 TABLET, FILM COATED ORAL
Status: DISCONTINUED | OUTPATIENT
Start: 2025-07-29 | End: 2025-07-31 | Stop reason: HOSPADM

## 2025-07-29 RX ORDER — PANTOPRAZOLE SODIUM 40 MG/1
40 TABLET, DELAYED RELEASE ORAL
Status: DISCONTINUED | OUTPATIENT
Start: 2025-07-29 | End: 2025-07-31 | Stop reason: HOSPADM

## 2025-07-29 RX ORDER — PRAVASTATIN SODIUM 40 MG
40 TABLET ORAL
Status: DISCONTINUED | OUTPATIENT
Start: 2025-07-29 | End: 2025-07-29

## 2025-07-29 RX ADMIN — LEVALBUTEROL HYDROCHLORIDE 1.25 MG: 1.25 SOLUTION RESPIRATORY (INHALATION) at 13:51

## 2025-07-29 RX ADMIN — IPRATROPIUM BROMIDE 0.5 MG: 0.5 SOLUTION RESPIRATORY (INHALATION) at 13:51

## 2025-07-29 RX ADMIN — PANTOPRAZOLE SODIUM 40 MG: 40 TABLET, DELAYED RELEASE ORAL at 11:51

## 2025-07-29 RX ADMIN — IPRATROPIUM BROMIDE 0.5 MG: 0.5 SOLUTION RESPIRATORY (INHALATION) at 07:03

## 2025-07-29 RX ADMIN — BUDESONIDE 0.5 MG: 0.5 INHALANT RESPIRATORY (INHALATION) at 19:17

## 2025-07-29 RX ADMIN — BENZONATATE 100 MG: 100 CAPSULE ORAL at 08:57

## 2025-07-29 RX ADMIN — LEVALBUTEROL HYDROCHLORIDE 1.25 MG: 1.25 SOLUTION RESPIRATORY (INHALATION) at 07:03

## 2025-07-29 RX ADMIN — BUDESONIDE 0.5 MG: 0.5 INHALANT RESPIRATORY (INHALATION) at 07:03

## 2025-07-29 RX ADMIN — FORMOTEROL FUMARATE DIHYDRATE 20 MCG: 20 SOLUTION RESPIRATORY (INHALATION) at 19:17

## 2025-07-29 RX ADMIN — BENZONATATE 100 MG: 100 CAPSULE ORAL at 20:32

## 2025-07-29 RX ADMIN — ENOXAPARIN SODIUM 40 MG: 40 INJECTION SUBCUTANEOUS at 08:57

## 2025-07-29 RX ADMIN — CHLORHEXIDINE GLUCONATE 15 ML: 1.2 SOLUTION ORAL at 20:32

## 2025-07-29 RX ADMIN — LEVALBUTEROL HYDROCHLORIDE 1.25 MG: 1.25 SOLUTION RESPIRATORY (INHALATION) at 19:17

## 2025-07-29 RX ADMIN — FORMOTEROL FUMARATE DIHYDRATE 20 MCG: 20 SOLUTION RESPIRATORY (INHALATION) at 07:03

## 2025-07-29 RX ADMIN — BENZONATATE 100 MG: 100 CAPSULE ORAL at 15:56

## 2025-07-29 RX ADMIN — CHLORHEXIDINE GLUCONATE 15 ML: 1.2 SOLUTION ORAL at 08:57

## 2025-07-29 RX ADMIN — ATORVASTATIN CALCIUM 40 MG: 40 TABLET, FILM COATED ORAL at 15:56

## 2025-07-29 RX ADMIN — IPRATROPIUM BROMIDE 0.5 MG: 0.5 SOLUTION RESPIRATORY (INHALATION) at 19:17

## 2025-07-30 ENCOUNTER — APPOINTMENT (INPATIENT)
Dept: RADIOLOGY | Facility: HOSPITAL | Age: 85
DRG: 164 | End: 2025-07-30
Payer: COMMERCIAL

## 2025-07-30 DIAGNOSIS — J44.9 CHRONIC OBSTRUCTIVE PULMONARY DISEASE, UNSPECIFIED COPD TYPE (HCC): Primary | ICD-10-CM

## 2025-07-30 LAB
ALBUMIN SERPL BCG-MCNC: 3.5 G/DL (ref 3.5–5)
ALP SERPL-CCNC: 71 U/L (ref 34–104)
ALT SERPL W P-5'-P-CCNC: 12 U/L (ref 7–52)
ANION GAP SERPL CALCULATED.3IONS-SCNC: 9 MMOL/L (ref 4–13)
AST SERPL W P-5'-P-CCNC: 15 U/L (ref 13–39)
BASOPHILS # BLD AUTO: 0.02 THOUSANDS/ÂΜL (ref 0–0.1)
BASOPHILS NFR BLD AUTO: 0 % (ref 0–1)
BILIRUB SERPL-MCNC: 0.53 MG/DL (ref 0.2–1)
BUN SERPL-MCNC: 18 MG/DL (ref 5–25)
CA-I BLD-SCNC: 1.15 MMOL/L (ref 1.12–1.32)
CALCIUM SERPL-MCNC: 9 MG/DL (ref 8.4–10.2)
CHLORIDE SERPL-SCNC: 100 MMOL/L (ref 96–108)
CO2 SERPL-SCNC: 31 MMOL/L (ref 21–32)
CREAT SERPL-MCNC: 1.13 MG/DL (ref 0.6–1.3)
EOSINOPHIL # BLD AUTO: 0.23 THOUSAND/ÂΜL (ref 0–0.61)
EOSINOPHIL NFR BLD AUTO: 3 % (ref 0–6)
ERYTHROCYTE [DISTWIDTH] IN BLOOD BY AUTOMATED COUNT: 13 % (ref 11.6–15.1)
GFR SERPL CREATININE-BSD FRML MDRD: 60 ML/MIN/1.73SQ M
GLUCOSE SERPL-MCNC: 128 MG/DL (ref 65–140)
HCT VFR BLD AUTO: 40.2 % (ref 36.5–49.3)
HGB BLD-MCNC: 13 G/DL (ref 12–17)
IMM GRANULOCYTES # BLD AUTO: 0.04 THOUSAND/UL (ref 0–0.2)
IMM GRANULOCYTES NFR BLD AUTO: 0 % (ref 0–2)
LYMPHOCYTES # BLD AUTO: 0.83 THOUSANDS/ÂΜL (ref 0.6–4.47)
LYMPHOCYTES NFR BLD AUTO: 9 % (ref 14–44)
MAGNESIUM SERPL-MCNC: 1.8 MG/DL (ref 1.9–2.7)
MCH RBC QN AUTO: 30.2 PG (ref 26.8–34.3)
MCHC RBC AUTO-ENTMCNC: 32.3 G/DL (ref 31.4–37.4)
MCV RBC AUTO: 94 FL (ref 82–98)
MONOCYTES # BLD AUTO: 0.97 THOUSAND/ÂΜL (ref 0.17–1.22)
MONOCYTES NFR BLD AUTO: 10 % (ref 4–12)
NEUTROPHILS # BLD AUTO: 7.28 THOUSANDS/ÂΜL (ref 1.85–7.62)
NEUTS SEG NFR BLD AUTO: 78 % (ref 43–75)
NRBC BLD AUTO-RTO: 0 /100 WBCS
PHOSPHATE SERPL-MCNC: 3.1 MG/DL (ref 2.3–4.1)
PLATELET # BLD AUTO: 147 THOUSANDS/UL (ref 149–390)
PMV BLD AUTO: 11.2 FL (ref 8.9–12.7)
POTASSIUM SERPL-SCNC: 4 MMOL/L (ref 3.5–5.3)
PROT SERPL-MCNC: 6.4 G/DL (ref 6.4–8.4)
RBC # BLD AUTO: 4.3 MILLION/UL (ref 3.88–5.62)
SODIUM SERPL-SCNC: 140 MMOL/L (ref 135–147)
WBC # BLD AUTO: 9.37 THOUSAND/UL (ref 4.31–10.16)

## 2025-07-30 PROCEDURE — 94664 DEMO&/EVAL PT USE INHALER: CPT

## 2025-07-30 PROCEDURE — 82330 ASSAY OF CALCIUM: CPT

## 2025-07-30 PROCEDURE — 71250 CT THORAX DX C-: CPT

## 2025-07-30 PROCEDURE — 94640 AIRWAY INHALATION TREATMENT: CPT

## 2025-07-30 PROCEDURE — 80053 COMPREHEN METABOLIC PANEL: CPT

## 2025-07-30 PROCEDURE — 83735 ASSAY OF MAGNESIUM: CPT

## 2025-07-30 PROCEDURE — 94760 N-INVAS EAR/PLS OXIMETRY 1: CPT

## 2025-07-30 PROCEDURE — 99291 CRITICAL CARE FIRST HOUR: CPT | Performed by: STUDENT IN AN ORGANIZED HEALTH CARE EDUCATION/TRAINING PROGRAM

## 2025-07-30 PROCEDURE — 85025 COMPLETE CBC W/AUTO DIFF WBC: CPT

## 2025-07-30 PROCEDURE — 84100 ASSAY OF PHOSPHORUS: CPT

## 2025-07-30 RX ORDER — MAGNESIUM SULFATE HEPTAHYDRATE 40 MG/ML
2 INJECTION, SOLUTION INTRAVENOUS ONCE
Status: COMPLETED | OUTPATIENT
Start: 2025-07-30 | End: 2025-07-30

## 2025-07-30 RX ADMIN — BUDESONIDE 0.5 MG: 0.5 INHALANT RESPIRATORY (INHALATION) at 19:18

## 2025-07-30 RX ADMIN — HYDROCODONE POLISTIREX AND CHLORPHENIRAMINE POLISTIREX 5 ML: 10; 8 SUSPENSION, EXTENDED RELEASE ORAL at 21:58

## 2025-07-30 RX ADMIN — LEVALBUTEROL HYDROCHLORIDE 1.25 MG: 1.25 SOLUTION RESPIRATORY (INHALATION) at 07:23

## 2025-07-30 RX ADMIN — IPRATROPIUM BROMIDE 0.5 MG: 0.5 SOLUTION RESPIRATORY (INHALATION) at 07:23

## 2025-07-30 RX ADMIN — LEVALBUTEROL HYDROCHLORIDE 1.25 MG: 1.25 SOLUTION RESPIRATORY (INHALATION) at 13:00

## 2025-07-30 RX ADMIN — BUDESONIDE 0.5 MG: 0.5 INHALANT RESPIRATORY (INHALATION) at 07:23

## 2025-07-30 RX ADMIN — LEVALBUTEROL HYDROCHLORIDE 1.25 MG: 1.25 SOLUTION RESPIRATORY (INHALATION) at 19:18

## 2025-07-30 RX ADMIN — IPRATROPIUM BROMIDE 0.5 MG: 0.5 SOLUTION RESPIRATORY (INHALATION) at 19:18

## 2025-07-30 RX ADMIN — ATORVASTATIN CALCIUM 40 MG: 40 TABLET, FILM COATED ORAL at 15:41

## 2025-07-30 RX ADMIN — BENZONATATE 100 MG: 100 CAPSULE ORAL at 08:10

## 2025-07-30 RX ADMIN — HYDROCODONE POLISTIREX AND CHLORPHENIRAMINE POLISTIREX 5 ML: 10; 8 SUSPENSION, EXTENDED RELEASE ORAL at 05:14

## 2025-07-30 RX ADMIN — BENZONATATE 100 MG: 100 CAPSULE ORAL at 21:58

## 2025-07-30 RX ADMIN — ENOXAPARIN SODIUM 40 MG: 40 INJECTION SUBCUTANEOUS at 08:10

## 2025-07-30 RX ADMIN — BENZONATATE 100 MG: 100 CAPSULE ORAL at 15:41

## 2025-07-30 RX ADMIN — CHLORHEXIDINE GLUCONATE 15 ML: 1.2 SOLUTION ORAL at 21:58

## 2025-07-30 RX ADMIN — FORMOTEROL FUMARATE DIHYDRATE 20 MCG: 20 SOLUTION RESPIRATORY (INHALATION) at 07:25

## 2025-07-30 RX ADMIN — FORMOTEROL FUMARATE DIHYDRATE 20 MCG: 20 SOLUTION RESPIRATORY (INHALATION) at 19:18

## 2025-07-30 RX ADMIN — MAGNESIUM SULFATE HEPTAHYDRATE 2 G: 40 INJECTION, SOLUTION INTRAVENOUS at 08:10

## 2025-07-30 RX ADMIN — IPRATROPIUM BROMIDE 0.5 MG: 0.5 SOLUTION RESPIRATORY (INHALATION) at 13:00

## 2025-07-30 RX ADMIN — PANTOPRAZOLE SODIUM 40 MG: 40 TABLET, DELAYED RELEASE ORAL at 05:12

## 2025-07-31 ENCOUNTER — TRANSITIONAL CARE MANAGEMENT (OUTPATIENT)
Dept: FAMILY MEDICINE CLINIC | Facility: CLINIC | Age: 85
End: 2025-07-31

## 2025-07-31 ENCOUNTER — TELEPHONE (OUTPATIENT)
Dept: FAMILY MEDICINE CLINIC | Facility: CLINIC | Age: 85
End: 2025-07-31

## 2025-07-31 ENCOUNTER — APPOINTMENT (INPATIENT)
Dept: RADIOLOGY | Facility: HOSPITAL | Age: 85
DRG: 164 | End: 2025-07-31
Payer: COMMERCIAL

## 2025-07-31 VITALS
RESPIRATION RATE: 33 BRPM | TEMPERATURE: 97.7 F | BODY MASS INDEX: 24.26 KG/M2 | OXYGEN SATURATION: 95 % | WEIGHT: 163.8 LBS | HEART RATE: 96 BPM | SYSTOLIC BLOOD PRESSURE: 148 MMHG | DIASTOLIC BLOOD PRESSURE: 77 MMHG | HEIGHT: 69 IN

## 2025-07-31 LAB
ANION GAP SERPL CALCULATED.3IONS-SCNC: 5 MMOL/L (ref 4–13)
BASOPHILS # BLD AUTO: 0.03 THOUSANDS/ÂΜL (ref 0–0.1)
BASOPHILS NFR BLD AUTO: 0 % (ref 0–1)
BUN SERPL-MCNC: 15 MG/DL (ref 5–25)
CALCIUM SERPL-MCNC: 7.7 MG/DL (ref 8.4–10.2)
CHLORIDE SERPL-SCNC: 104 MMOL/L (ref 96–108)
CO2 SERPL-SCNC: 30 MMOL/L (ref 21–32)
CREAT SERPL-MCNC: 0.97 MG/DL (ref 0.6–1.3)
EOSINOPHIL # BLD AUTO: 0.42 THOUSAND/ÂΜL (ref 0–0.61)
EOSINOPHIL NFR BLD AUTO: 6 % (ref 0–6)
ERYTHROCYTE [DISTWIDTH] IN BLOOD BY AUTOMATED COUNT: 12.9 % (ref 11.6–15.1)
GFR SERPL CREATININE-BSD FRML MDRD: 72 ML/MIN/1.73SQ M
GLUCOSE SERPL-MCNC: 106 MG/DL (ref 65–140)
HCT VFR BLD AUTO: 38.3 % (ref 36.5–49.3)
HGB BLD-MCNC: 12 G/DL (ref 12–17)
IMM GRANULOCYTES # BLD AUTO: 0.02 THOUSAND/UL (ref 0–0.2)
IMM GRANULOCYTES NFR BLD AUTO: 0 % (ref 0–2)
LYMPHOCYTES # BLD AUTO: 0.67 THOUSANDS/ÂΜL (ref 0.6–4.47)
LYMPHOCYTES NFR BLD AUTO: 9 % (ref 14–44)
MAGNESIUM SERPL-MCNC: 1.8 MG/DL (ref 1.9–2.7)
MCH RBC QN AUTO: 30.2 PG (ref 26.8–34.3)
MCHC RBC AUTO-ENTMCNC: 31.3 G/DL (ref 31.4–37.4)
MCV RBC AUTO: 96 FL (ref 82–98)
MONOCYTES # BLD AUTO: 0.79 THOUSAND/ÂΜL (ref 0.17–1.22)
MONOCYTES NFR BLD AUTO: 11 % (ref 4–12)
NEUTROPHILS # BLD AUTO: 5.26 THOUSANDS/ÂΜL (ref 1.85–7.62)
NEUTS SEG NFR BLD AUTO: 74 % (ref 43–75)
NRBC BLD AUTO-RTO: 0 /100 WBCS
PLATELET # BLD AUTO: 135 THOUSANDS/UL (ref 149–390)
PMV BLD AUTO: 11.4 FL (ref 8.9–12.7)
POTASSIUM SERPL-SCNC: 3.7 MMOL/L (ref 3.5–5.3)
RBC # BLD AUTO: 3.98 MILLION/UL (ref 3.88–5.62)
SODIUM SERPL-SCNC: 139 MMOL/L (ref 135–147)
WBC # BLD AUTO: 7.19 THOUSAND/UL (ref 4.31–10.16)

## 2025-07-31 PROCEDURE — 71045 X-RAY EXAM CHEST 1 VIEW: CPT

## 2025-07-31 PROCEDURE — 99238 HOSP IP/OBS DSCHRG MGMT 30/<: CPT | Performed by: STUDENT IN AN ORGANIZED HEALTH CARE EDUCATION/TRAINING PROGRAM

## 2025-07-31 PROCEDURE — 85025 COMPLETE CBC W/AUTO DIFF WBC: CPT

## 2025-07-31 PROCEDURE — 94640 AIRWAY INHALATION TREATMENT: CPT

## 2025-07-31 PROCEDURE — 94760 N-INVAS EAR/PLS OXIMETRY 1: CPT

## 2025-07-31 PROCEDURE — 80048 BASIC METABOLIC PNL TOTAL CA: CPT

## 2025-07-31 PROCEDURE — 94664 DEMO&/EVAL PT USE INHALER: CPT

## 2025-07-31 PROCEDURE — 83735 ASSAY OF MAGNESIUM: CPT

## 2025-07-31 RX ORDER — POTASSIUM CHLORIDE 1500 MG/1
40 TABLET, EXTENDED RELEASE ORAL ONCE
Status: COMPLETED | OUTPATIENT
Start: 2025-07-31 | End: 2025-07-31

## 2025-07-31 RX ORDER — MAGNESIUM SULFATE HEPTAHYDRATE 40 MG/ML
2 INJECTION, SOLUTION INTRAVENOUS ONCE
Status: COMPLETED | OUTPATIENT
Start: 2025-07-31 | End: 2025-07-31

## 2025-07-31 RX ADMIN — MAGNESIUM SULFATE HEPTAHYDRATE 2 G: 40 INJECTION, SOLUTION INTRAVENOUS at 08:27

## 2025-07-31 RX ADMIN — ENOXAPARIN SODIUM 40 MG: 40 INJECTION SUBCUTANEOUS at 08:27

## 2025-07-31 RX ADMIN — IPRATROPIUM BROMIDE 0.5 MG: 0.5 SOLUTION RESPIRATORY (INHALATION) at 07:06

## 2025-07-31 RX ADMIN — PANTOPRAZOLE SODIUM 40 MG: 40 TABLET, DELAYED RELEASE ORAL at 05:48

## 2025-07-31 RX ADMIN — BUDESONIDE 0.5 MG: 0.5 INHALANT RESPIRATORY (INHALATION) at 07:06

## 2025-07-31 RX ADMIN — LEVALBUTEROL HYDROCHLORIDE 1.25 MG: 1.25 SOLUTION RESPIRATORY (INHALATION) at 07:06

## 2025-07-31 RX ADMIN — CHLORHEXIDINE GLUCONATE 15 ML: 1.2 SOLUTION ORAL at 08:27

## 2025-07-31 RX ADMIN — FORMOTEROL FUMARATE DIHYDRATE 20 MCG: 20 SOLUTION RESPIRATORY (INHALATION) at 07:08

## 2025-07-31 RX ADMIN — BENZONATATE 100 MG: 100 CAPSULE ORAL at 08:27

## 2025-07-31 RX ADMIN — POTASSIUM CHLORIDE 40 MEQ: 1500 TABLET, EXTENDED RELEASE ORAL at 08:27

## 2025-08-07 ENCOUNTER — OFFICE VISIT (OUTPATIENT)
Age: 85
End: 2025-08-07
Payer: COMMERCIAL

## 2025-08-07 ENCOUNTER — APPOINTMENT (OUTPATIENT)
Age: 85
End: 2025-08-07
Payer: COMMERCIAL

## 2025-08-07 VITALS
SYSTOLIC BLOOD PRESSURE: 132 MMHG | HEIGHT: 69 IN | TEMPERATURE: 98.1 F | BODY MASS INDEX: 24.44 KG/M2 | RESPIRATION RATE: 18 BRPM | DIASTOLIC BLOOD PRESSURE: 74 MMHG | OXYGEN SATURATION: 93 % | HEART RATE: 76 BPM | WEIGHT: 165 LBS

## 2025-08-07 DIAGNOSIS — J41.0 SIMPLE CHRONIC BRONCHITIS (HCC): ICD-10-CM

## 2025-08-07 DIAGNOSIS — R06.02 SOB (SHORTNESS OF BREATH) ON EXERTION: Primary | ICD-10-CM

## 2025-08-07 DIAGNOSIS — J43.2 CENTRILOBULAR EMPHYSEMA (HCC): ICD-10-CM

## 2025-08-07 DIAGNOSIS — J96.11 CHRONIC HYPOXEMIC RESPIRATORY FAILURE (HCC): ICD-10-CM

## 2025-08-07 PROCEDURE — 99214 OFFICE O/P EST MOD 30 MIN: CPT | Performed by: INTERNAL MEDICINE
